# Patient Record
Sex: FEMALE | Race: WHITE | NOT HISPANIC OR LATINO | URBAN - METROPOLITAN AREA
[De-identification: names, ages, dates, MRNs, and addresses within clinical notes are randomized per-mention and may not be internally consistent; named-entity substitution may affect disease eponyms.]

---

## 2017-07-19 ENCOUNTER — OUTPATIENT (OUTPATIENT)
Dept: OUTPATIENT SERVICES | Facility: HOSPITAL | Age: 72
LOS: 1 days | Discharge: HOME | End: 2017-07-19

## 2017-07-19 DIAGNOSIS — Q78.8 OTHER SPECIFIED OSTEOCHONDRODYSPLASIAS: ICD-10-CM

## 2017-07-19 DIAGNOSIS — I05.9 RHEUMATIC MITRAL VALVE DISEASE, UNSPECIFIED: ICD-10-CM

## 2017-08-07 ENCOUNTER — OUTPATIENT (OUTPATIENT)
Dept: OUTPATIENT SERVICES | Facility: HOSPITAL | Age: 72
LOS: 1 days | Discharge: HOME | End: 2017-08-07

## 2017-08-07 DIAGNOSIS — Z01.818 ENCOUNTER FOR OTHER PREPROCEDURAL EXAMINATION: ICD-10-CM

## 2017-08-07 DIAGNOSIS — I34.0 NONRHEUMATIC MITRAL (VALVE) INSUFFICIENCY: ICD-10-CM

## 2017-08-07 DIAGNOSIS — Q78.8 OTHER SPECIFIED OSTEOCHONDRODYSPLASIAS: ICD-10-CM

## 2017-08-07 DIAGNOSIS — I05.9 RHEUMATIC MITRAL VALVE DISEASE, UNSPECIFIED: ICD-10-CM

## 2017-08-10 ENCOUNTER — OUTPATIENT (OUTPATIENT)
Dept: OUTPATIENT SERVICES | Facility: HOSPITAL | Age: 72
LOS: 1 days | Discharge: HOME | End: 2017-08-10

## 2017-08-10 DIAGNOSIS — Q78.8 OTHER SPECIFIED OSTEOCHONDRODYSPLASIAS: ICD-10-CM

## 2017-08-10 DIAGNOSIS — I05.9 RHEUMATIC MITRAL VALVE DISEASE, UNSPECIFIED: ICD-10-CM

## 2017-08-15 DIAGNOSIS — I25.10 ATHEROSCLEROTIC HEART DISEASE OF NATIVE CORONARY ARTERY WITHOUT ANGINA PECTORIS: ICD-10-CM

## 2017-08-15 DIAGNOSIS — I34.0 NONRHEUMATIC MITRAL (VALVE) INSUFFICIENCY: ICD-10-CM

## 2017-08-15 DIAGNOSIS — I27.2 OTHER SECONDARY PULMONARY HYPERTENSION: ICD-10-CM

## 2017-08-28 ENCOUNTER — OUTPATIENT (OUTPATIENT)
Dept: OUTPATIENT SERVICES | Facility: HOSPITAL | Age: 72
LOS: 1 days | Discharge: HOME | End: 2017-08-28

## 2017-08-28 DIAGNOSIS — R91.1 SOLITARY PULMONARY NODULE: ICD-10-CM

## 2017-08-28 DIAGNOSIS — Q78.8 OTHER SPECIFIED OSTEOCHONDRODYSPLASIAS: ICD-10-CM

## 2017-08-28 DIAGNOSIS — I05.9 RHEUMATIC MITRAL VALVE DISEASE, UNSPECIFIED: ICD-10-CM

## 2017-09-08 ENCOUNTER — OUTPATIENT (OUTPATIENT)
Dept: OUTPATIENT SERVICES | Facility: HOSPITAL | Age: 72
LOS: 1 days | Discharge: HOME | End: 2017-09-08

## 2017-09-08 DIAGNOSIS — I05.9 RHEUMATIC MITRAL VALVE DISEASE, UNSPECIFIED: ICD-10-CM

## 2017-09-08 DIAGNOSIS — Z01.818 ENCOUNTER FOR OTHER PREPROCEDURAL EXAMINATION: ICD-10-CM

## 2017-09-08 DIAGNOSIS — Q78.8 OTHER SPECIFIED OSTEOCHONDRODYSPLASIAS: ICD-10-CM

## 2017-09-14 ENCOUNTER — INPATIENT (INPATIENT)
Facility: HOSPITAL | Age: 72
LOS: 8 days | Discharge: HOME | End: 2017-09-23
Attending: THORACIC SURGERY (CARDIOTHORACIC VASCULAR SURGERY)

## 2017-09-14 DIAGNOSIS — Q78.8 OTHER SPECIFIED OSTEOCHONDRODYSPLASIAS: ICD-10-CM

## 2017-09-14 DIAGNOSIS — I27.2 OTHER SECONDARY PULMONARY HYPERTENSION: ICD-10-CM

## 2017-09-14 DIAGNOSIS — I05.9 RHEUMATIC MITRAL VALVE DISEASE, UNSPECIFIED: ICD-10-CM

## 2017-09-25 DIAGNOSIS — R91.8 OTHER NONSPECIFIC ABNORMAL FINDING OF LUNG FIELD: ICD-10-CM

## 2017-09-25 DIAGNOSIS — J44.9 CHRONIC OBSTRUCTIVE PULMONARY DISEASE, UNSPECIFIED: ICD-10-CM

## 2017-09-25 DIAGNOSIS — F41.9 ANXIETY DISORDER, UNSPECIFIED: ICD-10-CM

## 2017-09-25 DIAGNOSIS — J95.811 POSTPROCEDURAL PNEUMOTHORAX: ICD-10-CM

## 2017-09-25 DIAGNOSIS — D69.6 THROMBOCYTOPENIA, UNSPECIFIED: ICD-10-CM

## 2017-09-25 DIAGNOSIS — R01.1 CARDIAC MURMUR, UNSPECIFIED: ICD-10-CM

## 2017-09-25 DIAGNOSIS — I95.81 POSTPROCEDURAL HYPOTENSION: ICD-10-CM

## 2017-09-25 DIAGNOSIS — E87.6 HYPOKALEMIA: ICD-10-CM

## 2017-09-25 DIAGNOSIS — F32.9 MAJOR DEPRESSIVE DISORDER, SINGLE EPISODE, UNSPECIFIED: ICD-10-CM

## 2017-09-25 DIAGNOSIS — D72.829 ELEVATED WHITE BLOOD CELL COUNT, UNSPECIFIED: ICD-10-CM

## 2017-09-25 DIAGNOSIS — K59.00 CONSTIPATION, UNSPECIFIED: ICD-10-CM

## 2017-09-25 DIAGNOSIS — I34.0 NONRHEUMATIC MITRAL (VALVE) INSUFFICIENCY: ICD-10-CM

## 2017-09-25 DIAGNOSIS — D62 ACUTE POSTHEMORRHAGIC ANEMIA: ICD-10-CM

## 2017-09-25 DIAGNOSIS — E87.70 FLUID OVERLOAD, UNSPECIFIED: ICD-10-CM
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
## 2017-09-25 DIAGNOSIS — I10 ESSENTIAL (PRIMARY) HYPERTENSION: ICD-10-CM

## 2017-09-25 DIAGNOSIS — I48.0 PAROXYSMAL ATRIAL FIBRILLATION: ICD-10-CM

## 2017-09-25 DIAGNOSIS — R73.9 HYPERGLYCEMIA, UNSPECIFIED: ICD-10-CM

## 2017-09-27 DIAGNOSIS — I27.2 OTHER SECONDARY PULMONARY HYPERTENSION: ICD-10-CM

## 2017-09-27 DIAGNOSIS — Z87.891 PERSONAL HISTORY OF NICOTINE DEPENDENCE: ICD-10-CM

## 2017-10-02 ENCOUNTER — APPOINTMENT (OUTPATIENT)
Dept: CARDIOLOGY | Facility: CLINIC | Age: 72
End: 2017-10-02

## 2017-10-02 VITALS
SYSTOLIC BLOOD PRESSURE: 125 MMHG | DIASTOLIC BLOOD PRESSURE: 74 MMHG | HEIGHT: 65 IN | HEART RATE: 105 BPM | WEIGHT: 154 LBS | OXYGEN SATURATION: 100 % | BODY MASS INDEX: 25.66 KG/M2

## 2017-10-04 ENCOUNTER — OUTPATIENT (OUTPATIENT)
Dept: OUTPATIENT SERVICES | Facility: HOSPITAL | Age: 72
LOS: 1 days | Discharge: HOME | End: 2017-10-04

## 2017-10-04 DIAGNOSIS — R05 COUGH: ICD-10-CM

## 2017-10-04 DIAGNOSIS — Q78.8 OTHER SPECIFIED OSTEOCHONDRODYSPLASIAS: ICD-10-CM

## 2017-10-04 DIAGNOSIS — I34.9 NONRHEUMATIC MITRAL VALVE DISORDER, UNSPECIFIED: ICD-10-CM

## 2017-10-04 DIAGNOSIS — I05.9 RHEUMATIC MITRAL VALVE DISEASE, UNSPECIFIED: ICD-10-CM

## 2017-10-05 ENCOUNTER — OUTPATIENT (OUTPATIENT)
Dept: OUTPATIENT SERVICES | Facility: HOSPITAL | Age: 72
LOS: 1 days | Discharge: HOME | End: 2017-10-05

## 2017-10-05 DIAGNOSIS — Q78.8 OTHER SPECIFIED OSTEOCHONDRODYSPLASIAS: ICD-10-CM

## 2017-10-05 DIAGNOSIS — I05.9 RHEUMATIC MITRAL VALVE DISEASE, UNSPECIFIED: ICD-10-CM

## 2017-10-05 DIAGNOSIS — I34.0 NONRHEUMATIC MITRAL (VALVE) INSUFFICIENCY: ICD-10-CM

## 2017-10-09 ENCOUNTER — OUTPATIENT (OUTPATIENT)
Dept: OUTPATIENT SERVICES | Facility: HOSPITAL | Age: 72
LOS: 1 days | Discharge: HOME | End: 2017-10-09

## 2017-10-09 DIAGNOSIS — I34.0 NONRHEUMATIC MITRAL (VALVE) INSUFFICIENCY: ICD-10-CM

## 2017-10-09 DIAGNOSIS — I05.9 RHEUMATIC MITRAL VALVE DISEASE, UNSPECIFIED: ICD-10-CM

## 2017-10-09 DIAGNOSIS — Q78.8 OTHER SPECIFIED OSTEOCHONDRODYSPLASIAS: ICD-10-CM

## 2017-10-12 ENCOUNTER — OUTPATIENT (OUTPATIENT)
Dept: OUTPATIENT SERVICES | Facility: HOSPITAL | Age: 72
LOS: 1 days | Discharge: HOME | End: 2017-10-12

## 2017-10-12 DIAGNOSIS — I34.0 NONRHEUMATIC MITRAL (VALVE) INSUFFICIENCY: ICD-10-CM

## 2017-10-12 DIAGNOSIS — Q78.8 OTHER SPECIFIED OSTEOCHONDRODYSPLASIAS: ICD-10-CM

## 2017-10-12 DIAGNOSIS — I05.9 RHEUMATIC MITRAL VALVE DISEASE, UNSPECIFIED: ICD-10-CM

## 2017-10-16 ENCOUNTER — OUTPATIENT (OUTPATIENT)
Dept: OUTPATIENT SERVICES | Facility: HOSPITAL | Age: 72
LOS: 1 days | Discharge: HOME | End: 2017-10-16

## 2017-10-16 DIAGNOSIS — I34.0 NONRHEUMATIC MITRAL (VALVE) INSUFFICIENCY: ICD-10-CM

## 2017-10-16 DIAGNOSIS — I05.9 RHEUMATIC MITRAL VALVE DISEASE, UNSPECIFIED: ICD-10-CM

## 2017-10-16 DIAGNOSIS — Q78.8 OTHER SPECIFIED OSTEOCHONDRODYSPLASIAS: ICD-10-CM

## 2017-11-02 ENCOUNTER — OUTPATIENT (OUTPATIENT)
Dept: OUTPATIENT SERVICES | Facility: HOSPITAL | Age: 72
LOS: 1 days | Discharge: HOME | End: 2017-11-02

## 2017-11-02 DIAGNOSIS — R91.1 SOLITARY PULMONARY NODULE: ICD-10-CM

## 2017-11-02 DIAGNOSIS — Z01.818 ENCOUNTER FOR OTHER PREPROCEDURAL EXAMINATION: ICD-10-CM

## 2017-11-02 DIAGNOSIS — Q78.8 OTHER SPECIFIED OSTEOCHONDRODYSPLASIAS: ICD-10-CM

## 2017-11-02 DIAGNOSIS — I05.9 RHEUMATIC MITRAL VALVE DISEASE, UNSPECIFIED: ICD-10-CM

## 2017-11-13 ENCOUNTER — OUTPATIENT (OUTPATIENT)
Dept: OUTPATIENT SERVICES | Facility: HOSPITAL | Age: 72
LOS: 1 days | Discharge: HOME | End: 2017-11-13

## 2017-11-13 DIAGNOSIS — I05.9 RHEUMATIC MITRAL VALVE DISEASE, UNSPECIFIED: ICD-10-CM

## 2017-11-13 DIAGNOSIS — G25.0 ESSENTIAL TREMOR: ICD-10-CM

## 2017-11-13 DIAGNOSIS — Q78.8 OTHER SPECIFIED OSTEOCHONDRODYSPLASIAS: ICD-10-CM

## 2017-11-16 ENCOUNTER — INPATIENT (INPATIENT)
Facility: HOSPITAL | Age: 72
LOS: 2 days | Discharge: HOME | End: 2017-11-19
Attending: THORACIC SURGERY (CARDIOTHORACIC VASCULAR SURGERY)

## 2017-11-16 DIAGNOSIS — C77.1 SECONDARY AND UNSPECIFIED MALIGNANT NEOPLASM OF INTRATHORACIC LYMPH NODES: ICD-10-CM

## 2017-11-16 DIAGNOSIS — I05.9 RHEUMATIC MITRAL VALVE DISEASE, UNSPECIFIED: ICD-10-CM

## 2017-11-16 DIAGNOSIS — Q78.8 OTHER SPECIFIED OSTEOCHONDRODYSPLASIAS: ICD-10-CM

## 2017-11-22 DIAGNOSIS — R91.1 SOLITARY PULMONARY NODULE: ICD-10-CM

## 2017-11-22 DIAGNOSIS — I48.0 PAROXYSMAL ATRIAL FIBRILLATION: ICD-10-CM

## 2017-11-22 DIAGNOSIS — C34.12 MALIGNANT NEOPLASM OF UPPER LOBE, LEFT BRONCHUS OR LUNG: ICD-10-CM

## 2017-11-22 DIAGNOSIS — F32.9 MAJOR DEPRESSIVE DISORDER, SINGLE EPISODE, UNSPECIFIED: ICD-10-CM

## 2017-11-22 DIAGNOSIS — Z79.01 LONG TERM (CURRENT) USE OF ANTICOAGULANTS: ICD-10-CM

## 2017-11-22 DIAGNOSIS — Z96.1 PRESENCE OF INTRAOCULAR LENS: ICD-10-CM

## 2017-11-22 DIAGNOSIS — Z98.890 OTHER SPECIFIED POSTPROCEDURAL STATES: ICD-10-CM

## 2017-11-22 DIAGNOSIS — Z98.42 CATARACT EXTRACTION STATUS, LEFT EYE: ICD-10-CM

## 2017-11-22 DIAGNOSIS — Z98.41 CATARACT EXTRACTION STATUS, RIGHT EYE: ICD-10-CM

## 2017-11-22 DIAGNOSIS — J44.9 CHRONIC OBSTRUCTIVE PULMONARY DISEASE, UNSPECIFIED: ICD-10-CM

## 2017-11-22 DIAGNOSIS — F41.9 ANXIETY DISORDER, UNSPECIFIED: ICD-10-CM

## 2017-12-08 ENCOUNTER — OUTPATIENT (OUTPATIENT)
Dept: OUTPATIENT SERVICES | Facility: HOSPITAL | Age: 72
LOS: 1 days | Discharge: HOME | End: 2017-12-08

## 2017-12-08 ENCOUNTER — APPOINTMENT (OUTPATIENT)
Dept: INFUSION THERAPY | Facility: CLINIC | Age: 72
End: 2017-12-08

## 2017-12-08 ENCOUNTER — APPOINTMENT (OUTPATIENT)
Dept: HEMATOLOGY ONCOLOGY | Facility: CLINIC | Age: 72
End: 2017-12-08

## 2017-12-08 VITALS
HEIGHT: 65 IN | BODY MASS INDEX: 20.99 KG/M2 | SYSTOLIC BLOOD PRESSURE: 126 MMHG | TEMPERATURE: 95.7 F | WEIGHT: 126 LBS | HEART RATE: 89 BPM | DIASTOLIC BLOOD PRESSURE: 61 MMHG | RESPIRATION RATE: 16 BRPM

## 2017-12-14 ENCOUNTER — OUTPATIENT (OUTPATIENT)
Dept: OUTPATIENT SERVICES | Facility: HOSPITAL | Age: 72
LOS: 1 days | Discharge: HOME | End: 2017-12-14

## 2017-12-14 DIAGNOSIS — I05.9 RHEUMATIC MITRAL VALVE DISEASE, UNSPECIFIED: ICD-10-CM

## 2017-12-14 DIAGNOSIS — Q78.8 OTHER SPECIFIED OSTEOCHONDRODYSPLASIAS: ICD-10-CM

## 2017-12-14 DIAGNOSIS — C34.90 MALIGNANT NEOPLASM OF UNSPECIFIED PART OF UNSPECIFIED BRONCHUS OR LUNG: ICD-10-CM

## 2017-12-14 LAB
ALBUMIN SERPL-MCNC: 3.9 G/DL
ALBUMIN/GLOB SERPL: 1.34
ALP SERPL-CCNC: 75 IU/L
ALT SERPL-CCNC: 18 IU/L
ANION GAP SERPL CALC-SCNC: 7 MEQ/L
AST SERPL-CCNC: 27 IU/L
BASOPHILS # BLD: 0.05 TH/MM3
BASOPHILS NFR BLD: 0.8 %
BILIRUB SERPL-MCNC: 0.5 MG/DL
BUN SERPL-MCNC: 9 MG/DL
BUN/CREAT SERPL: 9.8 %
CALCIUM SERPL-MCNC: 10 MG/DL
CHLORIDE SERPL-SCNC: 105 MEQ/L
CO2 SERPL-SCNC: 27 MEQ/L
CREAT SERPL-MCNC: 0.92 MG/DL
EOSINOPHIL # BLD: 0.1 TH/MM3
EOSINOPHIL NFR BLD: 1.5 %
ERYTHROCYTE [DISTWIDTH] IN BLOOD BY AUTOMATED COUNT: 14.3 %
FOLATE SERPL-MCNC: > 20 NG/ML
GFR SERPL CREATININE-BSD FRML MDRD: 60
GLUCOSE SERPL-MCNC: 86 MG/DL
GRANULOCYTES # BLD: 4.38 TH/MM3
GRANULOCYTES NFR BLD: 67.7 %
HCT VFR BLD AUTO: 36.6 %
HGB BLD-MCNC: 11.7 G/DL
IMM GRANULOCYTES # BLD: 0.01 TH/MM3
IMM GRANULOCYTES NFR BLD: 0.2 %
LYMPHOCYTES # BLD: 1.18 TH/MM3
LYMPHOCYTES NFR BLD: 18.3 %
MCH RBC QN AUTO: 27.5 PG
MCHC RBC AUTO-ENTMCNC: 32 G/DL
MCV RBC AUTO: 86.1 FL
MONOCYTES # BLD: 0.74 TH/MM3
MONOCYTES NFR BLD: 11.5 %
PLATELET # BLD: 412 TH/MM3
PMV BLD AUTO: 8.5 FL
POTASSIUM SERPL-SCNC: 5.1 MMOL/L
PROT SERPL-MCNC: 6.8 G/DL
RBC # BLD AUTO: 4.25 MIL/MM3
SODIUM SERPL-SCNC: 139 MEQ/L
VIT B12 SERPL-MCNC: 1459 PG/ML
WBC # BLD: 6.46 TH/MM3

## 2017-12-18 ENCOUNTER — OUTPATIENT (OUTPATIENT)
Dept: OUTPATIENT SERVICES | Facility: HOSPITAL | Age: 72
LOS: 1 days | Discharge: HOME | End: 2017-12-18

## 2017-12-18 DIAGNOSIS — I05.9 RHEUMATIC MITRAL VALVE DISEASE, UNSPECIFIED: ICD-10-CM

## 2017-12-18 DIAGNOSIS — C34.90 MALIGNANT NEOPLASM OF UNSPECIFIED PART OF UNSPECIFIED BRONCHUS OR LUNG: ICD-10-CM

## 2017-12-18 DIAGNOSIS — Q78.8 OTHER SPECIFIED OSTEOCHONDRODYSPLASIAS: ICD-10-CM

## 2017-12-21 DIAGNOSIS — C34.90 MALIGNANT NEOPLASM OF UNSPECIFIED PART OF UNSPECIFIED BRONCHUS OR LUNG: ICD-10-CM

## 2018-01-16 ENCOUNTER — APPOINTMENT (OUTPATIENT)
Dept: HEMATOLOGY ONCOLOGY | Facility: CLINIC | Age: 73
End: 2018-01-16

## 2018-01-16 VITALS
BODY MASS INDEX: 20.66 KG/M2 | RESPIRATION RATE: 16 BRPM | WEIGHT: 124 LBS | DIASTOLIC BLOOD PRESSURE: 71 MMHG | SYSTOLIC BLOOD PRESSURE: 147 MMHG | TEMPERATURE: 97.2 F | HEIGHT: 65 IN | HEART RATE: 97 BPM

## 2018-01-18 ENCOUNTER — APPOINTMENT (OUTPATIENT)
Dept: INFUSION THERAPY | Facility: CLINIC | Age: 73
End: 2018-01-18

## 2018-01-19 ENCOUNTER — APPOINTMENT (OUTPATIENT)
Dept: INFUSION THERAPY | Facility: CLINIC | Age: 73
End: 2018-01-19

## 2018-01-19 LAB
ALBUMIN SERPL-MCNC: 3.7 G/DL
ALBUMIN/GLOB SERPL: 1.37
ALP SERPL-CCNC: 70 IU/L
ALT SERPL-CCNC: 20 IU/L
ANION GAP SERPL CALC-SCNC: 9 MEQ/L
AST SERPL-CCNC: 23 IU/L
BASOPHILS # BLD: 0.07 TH/MM3
BASOPHILS NFR BLD: 1 %
BILIRUB SERPL-MCNC: 0.5 MG/DL
BUN SERPL-MCNC: 11 MG/DL
BUN/CREAT SERPL: 12.2 %
CALCIUM SERPL-MCNC: 9.9 MG/DL
CHLORIDE SERPL-SCNC: 105 MEQ/L
CO2 SERPL-SCNC: 28 MEQ/L
CREAT SERPL-MCNC: 0.9 MG/DL
EOSINOPHIL # BLD: 0.16 TH/MM3
EOSINOPHIL NFR BLD: 2.4 %
ERYTHROCYTE [DISTWIDTH] IN BLOOD BY AUTOMATED COUNT: 15 %
GFR SERPL CREATININE-BSD FRML MDRD: 61
GLUCOSE SERPL-MCNC: 98 MG/DL
GRANULOCYTES # BLD: 4.17 TH/MM3
GRANULOCYTES NFR BLD: 62.2 %
HCT VFR BLD AUTO: 32.3 %
HGB BLD-MCNC: 11.8 G/DL
IMM GRANULOCYTES # BLD: 0.03 TH/MM3
IMM GRANULOCYTES NFR BLD: 0.4 %
LYMPHOCYTES # BLD: 1.48 TH/MM3
LYMPHOCYTES NFR BLD: 22.1 %
MCH RBC QN AUTO: 27.6 PG
MCHC RBC AUTO-ENTMCNC: 36.5 G/DL
MCV RBC AUTO: 75.6 FL
MONOCYTES # BLD: 0.8 TH/MM3
MONOCYTES NFR BLD: 11.9 %
PLATELET # BLD: 308 TH/MM3
PMV BLD AUTO: 8.5 FL
POTASSIUM SERPL-SCNC: 3.9 MMOL/L
PROT SERPL-MCNC: 6.4 G/DL
RBC # BLD AUTO: 4.27 MIL/MM3
SODIUM SERPL-SCNC: 142 MEQ/L
WBC # BLD: 6.71 TH/MM3

## 2018-02-09 ENCOUNTER — APPOINTMENT (OUTPATIENT)
Dept: INFUSION THERAPY | Facility: CLINIC | Age: 73
End: 2018-02-09

## 2018-02-09 ENCOUNTER — APPOINTMENT (OUTPATIENT)
Dept: HEMATOLOGY ONCOLOGY | Facility: CLINIC | Age: 73
End: 2018-02-09

## 2018-02-09 ENCOUNTER — LABORATORY RESULT (OUTPATIENT)
Age: 73
End: 2018-02-09

## 2018-02-09 VITALS
RESPIRATION RATE: 16 BRPM | SYSTOLIC BLOOD PRESSURE: 154 MMHG | DIASTOLIC BLOOD PRESSURE: 66 MMHG | HEART RATE: 92 BPM | BODY MASS INDEX: 20.33 KG/M2 | TEMPERATURE: 96.8 F | HEIGHT: 65 IN | WEIGHT: 122 LBS

## 2018-02-09 RX ORDER — PEMETREXED 500 MG/20ML
800 INJECTION, SOLUTION, CONCENTRATE INTRAVENOUS ONCE
Qty: 0 | Refills: 0 | Status: COMPLETED | OUTPATIENT
Start: 2018-02-09 | End: 2018-02-09

## 2018-02-09 RX ORDER — ONDANSETRON 8 MG/1
16 TABLET, FILM COATED ORAL ONCE
Qty: 0 | Refills: 0 | Status: COMPLETED | OUTPATIENT
Start: 2018-02-09 | End: 2018-02-09

## 2018-02-09 RX ORDER — CARBOPLATIN 50 MG
370 VIAL (EA) INTRAVENOUS ONCE
Qty: 0 | Refills: 0 | Status: COMPLETED | OUTPATIENT
Start: 2018-02-09 | End: 2018-02-09

## 2018-02-09 RX ORDER — DEXAMETHASONE 0.5 MG/5ML
12 ELIXIR ORAL ONCE
Qty: 0 | Refills: 0 | Status: COMPLETED | OUTPATIENT
Start: 2018-02-09 | End: 2018-02-09

## 2018-02-09 RX ADMIN — Medication 287 MILLIGRAM(S): at 10:04

## 2018-02-09 RX ADMIN — PEMETREXED 396 MILLIGRAM(S): 500 INJECTION, SOLUTION, CONCENTRATE INTRAVENOUS at 10:05

## 2018-02-09 RX ADMIN — ONDANSETRON 174 MILLIGRAM(S): 8 TABLET, FILM COATED ORAL at 09:54

## 2018-02-09 RX ADMIN — Medication 159 MILLIGRAM(S): at 09:55

## 2018-02-15 LAB
ALBUMIN SERPL ELPH-MCNC: 4 G/DL
ALP BLD-CCNC: 77 U/L
ALT SERPL-CCNC: 31 U/L
ANION GAP SERPL CALC-SCNC: 6 MMOL/L
AST SERPL-CCNC: 32 U/L
BILIRUB SERPL-MCNC: 0.5 MG/DL
BUN SERPL-MCNC: 10 MG/DL
CALCIUM SERPL-MCNC: 9.9 MG/DL
CHLORIDE SERPL-SCNC: 104 MMOL/L
CO2 SERPL-SCNC: 28 MMOL/L
CREAT SERPL-MCNC: 1 MG/DL
FERRITIN SERPL-MCNC: 130 NG/ML
FOLATE SERPL-MCNC: >20 NG/ML
GLUCOSE SERPL-MCNC: 71 MG/DL
HCT VFR BLD CALC: 34.5 %
HGB BLD-MCNC: 11.5 G/DL
IRON SATN MFR SERPL: 16 %
IRON SERPL-MCNC: 60 UG/DL
MCHC RBC-ENTMCNC: 27.8 PG
MCHC RBC-ENTMCNC: 33.3 G/DL
MCV RBC AUTO: 83.3 FL
METHYLMALONATE SERPL-SCNC: 194 NMOL/L
PLATELET # BLD AUTO: 488 K/UL
PMV BLD: 8.5 FL
POTASSIUM SERPL-SCNC: 4.2 MMOL/L
PROT SERPL-MCNC: 6.7 G/DL
RBC # BLD: 4.14 M/UL
RBC # FLD: 16.7 %
SODIUM SERPL-SCNC: 138 MMOL/L
TIBC SERPL-MCNC: 369 UG/ML
VIT B12 SERPL-MCNC: >2000 PG/ML
WBC # FLD AUTO: 7.4 K/UL

## 2018-03-02 ENCOUNTER — APPOINTMENT (OUTPATIENT)
Dept: HEMATOLOGY ONCOLOGY | Facility: CLINIC | Age: 73
End: 2018-03-02

## 2018-03-02 ENCOUNTER — APPOINTMENT (OUTPATIENT)
Dept: INFUSION THERAPY | Facility: CLINIC | Age: 73
End: 2018-03-02

## 2018-03-02 ENCOUNTER — LABORATORY RESULT (OUTPATIENT)
Age: 73
End: 2018-03-02

## 2018-03-02 VITALS
TEMPERATURE: 98.2 F | HEART RATE: 93 BPM | WEIGHT: 124 LBS | BODY MASS INDEX: 20.66 KG/M2 | DIASTOLIC BLOOD PRESSURE: 56 MMHG | HEIGHT: 65 IN | RESPIRATION RATE: 16 BRPM | SYSTOLIC BLOOD PRESSURE: 120 MMHG

## 2018-03-02 RX ORDER — PEMETREXED 500 MG/20ML
800 INJECTION, SOLUTION, CONCENTRATE INTRAVENOUS ONCE
Qty: 0 | Refills: 0 | Status: COMPLETED | OUTPATIENT
Start: 2018-03-02 | End: 2018-03-02

## 2018-03-02 RX ORDER — PREGABALIN 225 MG/1
1000 CAPSULE ORAL ONCE
Qty: 0 | Refills: 0 | Status: COMPLETED | OUTPATIENT
Start: 2018-03-02 | End: 2018-03-02

## 2018-03-02 RX ORDER — PEGFILGRASTIM-CBQV 6 MG/.6ML
6 INJECTION, SOLUTION SUBCUTANEOUS ONCE
Qty: 0 | Refills: 0 | Status: COMPLETED | OUTPATIENT
Start: 2018-03-02 | End: 2018-03-02

## 2018-03-02 RX ORDER — DEXAMETHASONE 0.5 MG/5ML
12 ELIXIR ORAL ONCE
Qty: 0 | Refills: 0 | Status: COMPLETED | OUTPATIENT
Start: 2018-03-02 | End: 2018-03-02

## 2018-03-02 RX ORDER — CARBOPLATIN 50 MG
370 VIAL (EA) INTRAVENOUS ONCE
Qty: 0 | Refills: 0 | Status: COMPLETED | OUTPATIENT
Start: 2018-03-02 | End: 2018-03-02

## 2018-03-02 RX ADMIN — Medication 287 MILLIGRAM(S): at 09:50

## 2018-03-02 RX ADMIN — Medication 183 MILLIGRAM(S): at 09:33

## 2018-03-02 RX ADMIN — PEGFILGRASTIM-CBQV 6 MILLIGRAM(S): 6 INJECTION, SOLUTION SUBCUTANEOUS at 10:11

## 2018-03-02 RX ADMIN — PEMETREXED 396 MILLIGRAM(S): 500 INJECTION, SOLUTION, CONCENTRATE INTRAVENOUS at 09:49

## 2018-03-02 RX ADMIN — PREGABALIN 1000 MICROGRAM(S): 225 CAPSULE ORAL at 10:12

## 2018-03-12 LAB
ALBUMIN SERPL ELPH-MCNC: 4 G/DL
ALP BLD-CCNC: 80 U/L
ALT SERPL-CCNC: 34 U/L
ANION GAP SERPL CALC-SCNC: 12 MMOL/L
AST SERPL-CCNC: 40 U/L
BILIRUB SERPL-MCNC: 0.3 MG/DL
BUN SERPL-MCNC: 9 MG/DL
CALCIUM SERPL-MCNC: 9.1 MG/DL
CHLORIDE SERPL-SCNC: 102 MMOL/L
CO2 SERPL-SCNC: 25 MMOL/L
CREAT SERPL-MCNC: 0.9 MG/DL
GLUCOSE SERPL-MCNC: 69 MG/DL
HCT VFR BLD CALC: 33.5 %
HGB BLD-MCNC: 11.5 G/DL
MCHC RBC-ENTMCNC: 29.5 PG
MCHC RBC-ENTMCNC: 34.3 G/DL
MCV RBC AUTO: 85.9 FL
PLATELET # BLD AUTO: 339 K/UL
PMV BLD: 8.9 FL
POTASSIUM SERPL-SCNC: 5.3 MMOL/L
PROT SERPL-MCNC: 6.7 G/DL
RBC # BLD: 3.9 M/UL
RBC # FLD: 19.9 %
SODIUM SERPL-SCNC: 139 MMOL/L
WBC # FLD AUTO: 7.23 K/UL

## 2018-03-23 ENCOUNTER — OUTPATIENT (OUTPATIENT)
Dept: OUTPATIENT SERVICES | Facility: HOSPITAL | Age: 73
LOS: 1 days | Discharge: HOME | End: 2018-03-23

## 2018-03-23 ENCOUNTER — APPOINTMENT (OUTPATIENT)
Dept: INFUSION THERAPY | Facility: CLINIC | Age: 73
End: 2018-03-23

## 2018-03-23 ENCOUNTER — APPOINTMENT (OUTPATIENT)
Dept: HEMATOLOGY ONCOLOGY | Facility: CLINIC | Age: 73
End: 2018-03-23

## 2018-03-23 ENCOUNTER — LABORATORY RESULT (OUTPATIENT)
Age: 73
End: 2018-03-23

## 2018-03-23 VITALS
RESPIRATION RATE: 16 BRPM | SYSTOLIC BLOOD PRESSURE: 137 MMHG | DIASTOLIC BLOOD PRESSURE: 58 MMHG | TEMPERATURE: 96.7 F | BODY MASS INDEX: 20.33 KG/M2 | HEIGHT: 65 IN | HEART RATE: 90 BPM | WEIGHT: 122 LBS

## 2018-03-23 VITALS — HEIGHT: 65 IN | WEIGHT: 121.92 LBS

## 2018-03-23 DIAGNOSIS — Z51.11 ENCOUNTER FOR ANTINEOPLASTIC CHEMOTHERAPY: ICD-10-CM

## 2018-03-23 DIAGNOSIS — C34.90 MALIGNANT NEOPLASM OF UNSPECIFIED PART OF UNSPECIFIED BRONCHUS OR LUNG: ICD-10-CM

## 2018-03-23 RX ORDER — CARBOPLATIN 50 MG
365 VIAL (EA) INTRAVENOUS ONCE
Qty: 0 | Refills: 0 | Status: COMPLETED | OUTPATIENT
Start: 2018-03-23 | End: 2018-03-23

## 2018-03-23 RX ORDER — DEXAMETHASONE 0.5 MG/5ML
12 ELIXIR ORAL ONCE
Qty: 0 | Refills: 0 | Status: COMPLETED | OUTPATIENT
Start: 2018-03-23 | End: 2018-03-23

## 2018-03-23 RX ORDER — PEGFILGRASTIM-CBQV 6 MG/.6ML
6 INJECTION, SOLUTION SUBCUTANEOUS ONCE
Qty: 0 | Refills: 0 | Status: COMPLETED | OUTPATIENT
Start: 2018-03-23 | End: 2018-03-23

## 2018-03-23 RX ORDER — PEMETREXED 500 MG/20ML
800 INJECTION, SOLUTION, CONCENTRATE INTRAVENOUS ONCE
Qty: 0 | Refills: 0 | Status: COMPLETED | OUTPATIENT
Start: 2018-03-23 | End: 2018-03-23

## 2018-03-23 RX ADMIN — Medication 183 MILLIGRAM(S): at 10:12

## 2018-03-23 RX ADMIN — PEMETREXED 396 MILLIGRAM(S): 500 INJECTION, SOLUTION, CONCENTRATE INTRAVENOUS at 10:45

## 2018-03-23 RX ADMIN — Medication 286.5 MILLIGRAM(S): at 10:46

## 2018-03-23 RX ADMIN — PEGFILGRASTIM-CBQV 6 MILLIGRAM(S): 6 INJECTION, SOLUTION SUBCUTANEOUS at 12:11

## 2018-03-26 LAB
ALBUMIN SERPL ELPH-MCNC: 4 G/DL
ALP BLD-CCNC: 80 U/L
ALT SERPL-CCNC: 23 U/L
ANION GAP SERPL CALC-SCNC: 13 MMOL/L
AST SERPL-CCNC: 21 U/L
BILIRUB SERPL-MCNC: <0.2 MG/DL
BUN SERPL-MCNC: 11 MG/DL
CALCIUM SERPL-MCNC: 9.4 MG/DL
CHLORIDE SERPL-SCNC: 101 MMOL/L
CO2 SERPL-SCNC: 27 MMOL/L
CREAT SERPL-MCNC: 0.9 MG/DL
GLUCOSE SERPL-MCNC: 110 MG/DL
HCT VFR BLD CALC: 31.7 %
HGB BLD-MCNC: 10.5 G/DL
MCHC RBC-ENTMCNC: 29.5 PG
MCHC RBC-ENTMCNC: 33.1 G/DL
MCV RBC AUTO: 89 FL
PLATELET # BLD AUTO: 379 K/UL
PMV BLD: 8.7 FL
POTASSIUM SERPL-SCNC: 4.5 MMOL/L
PROT SERPL-MCNC: 6.6 G/DL
RBC # BLD: 3.56 M/UL
RBC # FLD: 21.2 %
SODIUM SERPL-SCNC: 141 MMOL/L
WBC # FLD AUTO: 6.06 K/UL

## 2018-04-03 ENCOUNTER — APPOINTMENT (OUTPATIENT)
Dept: HEMATOLOGY ONCOLOGY | Facility: CLINIC | Age: 73
End: 2018-04-03

## 2018-04-13 ENCOUNTER — APPOINTMENT (OUTPATIENT)
Dept: HEMATOLOGY ONCOLOGY | Facility: CLINIC | Age: 73
End: 2018-04-13

## 2018-04-13 ENCOUNTER — APPOINTMENT (OUTPATIENT)
Dept: INFUSION THERAPY | Facility: CLINIC | Age: 73
End: 2018-04-13

## 2018-04-20 ENCOUNTER — LABORATORY RESULT (OUTPATIENT)
Age: 73
End: 2018-04-20

## 2018-04-20 ENCOUNTER — APPOINTMENT (OUTPATIENT)
Dept: HEMATOLOGY ONCOLOGY | Facility: CLINIC | Age: 73
End: 2018-04-20

## 2018-04-20 VITALS
WEIGHT: 124 LBS | SYSTOLIC BLOOD PRESSURE: 134 MMHG | BODY MASS INDEX: 20.66 KG/M2 | RESPIRATION RATE: 16 BRPM | HEIGHT: 65 IN | DIASTOLIC BLOOD PRESSURE: 60 MMHG | HEART RATE: 91 BPM | TEMPERATURE: 95.9 F

## 2018-04-24 LAB
ALBUMIN SERPL ELPH-MCNC: 4.3 G/DL
ALP BLD-CCNC: 79 U/L
ALT SERPL-CCNC: 17 U/L
ANION GAP SERPL CALC-SCNC: 15 MMOL/L
AST SERPL-CCNC: 26 U/L
BILIRUB SERPL-MCNC: 0.3 MG/DL
BUN SERPL-MCNC: 10 MG/DL
CALCIUM SERPL-MCNC: 10 MG/DL
CHLORIDE SERPL-SCNC: 100 MMOL/L
CO2 SERPL-SCNC: 26 MMOL/L
CREAT SERPL-MCNC: 0.9 MG/DL
FOLATE SERPL-MCNC: >20 NG/ML
GLUCOSE SERPL-MCNC: 80 MG/DL
HCT VFR BLD CALC: 35.2 %
HGB BLD-MCNC: 11.5 G/DL
MCHC RBC-ENTMCNC: 30.7 PG
MCHC RBC-ENTMCNC: 32.7 G/DL
MCV RBC AUTO: 93.9 FL
PLATELET # BLD AUTO: 389 K/UL
PMV BLD: 8.5 FL
POTASSIUM SERPL-SCNC: 4.8 MMOL/L
PROT SERPL-MCNC: 7.1 G/DL
RBC # BLD: 3.75 M/UL
RBC # FLD: 21.3 %
SODIUM SERPL-SCNC: 141 MMOL/L
VIT B12 SERPL-MCNC: >2000 PG/ML
WBC # FLD AUTO: 7.95 K/UL

## 2018-04-26 LAB — METHYLMALONATE SERPL-SCNC: 281 NMOL/L

## 2018-05-24 ENCOUNTER — OUTPATIENT (OUTPATIENT)
Dept: OUTPATIENT SERVICES | Facility: HOSPITAL | Age: 73
LOS: 1 days | Discharge: HOME | End: 2018-05-24

## 2018-05-24 DIAGNOSIS — R10.10 UPPER ABDOMINAL PAIN, UNSPECIFIED: ICD-10-CM

## 2018-05-25 ENCOUNTER — OUTPATIENT (OUTPATIENT)
Dept: OUTPATIENT SERVICES | Facility: HOSPITAL | Age: 73
LOS: 1 days | Discharge: HOME | End: 2018-05-25

## 2018-05-25 DIAGNOSIS — R91.1 SOLITARY PULMONARY NODULE: ICD-10-CM

## 2018-05-29 ENCOUNTER — OUTPATIENT (OUTPATIENT)
Dept: OUTPATIENT SERVICES | Facility: HOSPITAL | Age: 73
LOS: 1 days | Discharge: HOME | End: 2018-05-29

## 2018-05-29 ENCOUNTER — APPOINTMENT (OUTPATIENT)
Dept: HEMATOLOGY ONCOLOGY | Facility: CLINIC | Age: 73
End: 2018-05-29

## 2018-05-29 VITALS
HEART RATE: 83 BPM | HEIGHT: 65 IN | BODY MASS INDEX: 20.49 KG/M2 | RESPIRATION RATE: 16 BRPM | DIASTOLIC BLOOD PRESSURE: 64 MMHG | TEMPERATURE: 97.3 F | WEIGHT: 123 LBS | SYSTOLIC BLOOD PRESSURE: 150 MMHG

## 2018-05-29 DIAGNOSIS — C34.90 MALIGNANT NEOPLASM OF UNSPECIFIED PART OF UNSPECIFIED BRONCHUS OR LUNG: ICD-10-CM

## 2018-07-02 LAB
HCT VFR BLD CALC: 39.1 % — SIGNIFICANT CHANGE UP (ref 37–47)
HGB BLD-MCNC: 13 G/DL — SIGNIFICANT CHANGE UP (ref 12–16)
MCHC RBC-ENTMCNC: 30.5 PG — SIGNIFICANT CHANGE UP (ref 27–31)
MCHC RBC-ENTMCNC: 33.2 G/DL — SIGNIFICANT CHANGE UP (ref 32–37)
MCV RBC AUTO: 91.8 FL — SIGNIFICANT CHANGE UP (ref 81–99)
NRBC # BLD: 0 /100 WBCS — SIGNIFICANT CHANGE UP (ref 0–0)
PLATELET # BLD AUTO: 164 K/UL — SIGNIFICANT CHANGE UP (ref 130–400)
RBC # BLD: 4.26 M/UL — SIGNIFICANT CHANGE UP (ref 4.2–5.4)
RBC # FLD: 13.8 % — SIGNIFICANT CHANGE UP (ref 11.5–14.5)
WBC # BLD: 5.41 K/UL — SIGNIFICANT CHANGE UP (ref 4.8–10.8)
WBC # FLD AUTO: 5.41 K/UL — SIGNIFICANT CHANGE UP (ref 4.8–10.8)

## 2018-07-27 ENCOUNTER — OUTPATIENT (OUTPATIENT)
Dept: OUTPATIENT SERVICES | Facility: HOSPITAL | Age: 73
LOS: 1 days | Discharge: HOME | End: 2018-07-27

## 2018-07-27 DIAGNOSIS — C34.10 MALIGNANT NEOPLASM OF UPPER LOBE, UNSPECIFIED BRONCHUS OR LUNG: ICD-10-CM

## 2018-08-22 ENCOUNTER — APPOINTMENT (OUTPATIENT)
Dept: HEMATOLOGY ONCOLOGY | Facility: CLINIC | Age: 73
End: 2018-08-22

## 2018-08-22 ENCOUNTER — LABORATORY RESULT (OUTPATIENT)
Age: 73
End: 2018-08-22

## 2018-08-22 VITALS
RESPIRATION RATE: 14 BRPM | WEIGHT: 123 LBS | TEMPERATURE: 96.6 F | HEIGHT: 65 IN | DIASTOLIC BLOOD PRESSURE: 53 MMHG | SYSTOLIC BLOOD PRESSURE: 96 MMHG | BODY MASS INDEX: 20.49 KG/M2 | HEART RATE: 114 BPM

## 2018-08-23 LAB
ALBUMIN SERPL ELPH-MCNC: 4.2 G/DL
ALP BLD-CCNC: 69 U/L
ALT SERPL-CCNC: 17 U/L
ANION GAP SERPL CALC-SCNC: 14 MMOL/L
AST SERPL-CCNC: 23 U/L
BILIRUB SERPL-MCNC: 0.5 MG/DL
BUN SERPL-MCNC: 15 MG/DL
CALCIUM SERPL-MCNC: 9.5 MG/DL
CHLORIDE SERPL-SCNC: 104 MMOL/L
CO2 SERPL-SCNC: 25 MMOL/L
CREAT SERPL-MCNC: 1 MG/DL
GLUCOSE SERPL-MCNC: 91 MG/DL
HCT VFR BLD CALC: 37.8 %
HGB BLD-MCNC: 12.7 G/DL
MCHC RBC-ENTMCNC: 30.5 PG
MCHC RBC-ENTMCNC: 33.6 G/DL
MCV RBC AUTO: 90.9 FL
PLATELET # BLD AUTO: 167 K/UL
PMV BLD: 8.6 FL
POTASSIUM SERPL-SCNC: 4.2 MMOL/L
PROT SERPL-MCNC: 6.8 G/DL
RBC # BLD: 4.16 M/UL
RBC # FLD: 15.8 %
SODIUM SERPL-SCNC: 143 MMOL/L
WBC # FLD AUTO: 6.33 K/UL

## 2018-11-16 ENCOUNTER — OUTPATIENT (OUTPATIENT)
Dept: OUTPATIENT SERVICES | Facility: HOSPITAL | Age: 73
LOS: 1 days | Discharge: HOME | End: 2018-11-16

## 2018-11-16 ENCOUNTER — APPOINTMENT (OUTPATIENT)
Dept: HEMATOLOGY ONCOLOGY | Facility: CLINIC | Age: 73
End: 2018-11-16

## 2018-11-16 DIAGNOSIS — Z00.00 ENCOUNTER FOR GENERAL ADULT MEDICAL EXAMINATION W/OUT ABNORMAL FINDINGS: ICD-10-CM

## 2018-11-16 DIAGNOSIS — C34.90 MALIGNANT NEOPLASM OF UNSPECIFIED PART OF UNSPECIFIED BRONCHUS OR LUNG: ICD-10-CM

## 2018-11-16 LAB
ALBUMIN SERPL ELPH-MCNC: 4.3 G/DL
ALP BLD-CCNC: 71 U/L
ALT SERPL-CCNC: 17 U/L
ANION GAP SERPL CALC-SCNC: 13 MMOL/L
AST SERPL-CCNC: 21 U/L
BILIRUB SERPL-MCNC: 0.5 MG/DL
BUN SERPL-MCNC: 13 MG/DL
CALCIUM SERPL-MCNC: 9.8 MG/DL
CHLORIDE SERPL-SCNC: 100 MMOL/L
CO2 SERPL-SCNC: 28 MMOL/L
CREAT SERPL-MCNC: 0.9 MG/DL
GLUCOSE SERPL-MCNC: 74 MG/DL
POTASSIUM SERPL-SCNC: 4.4 MMOL/L
PROT SERPL-MCNC: 6.9 G/DL
SODIUM SERPL-SCNC: 141 MMOL/L

## 2018-11-27 ENCOUNTER — OUTPATIENT (OUTPATIENT)
Dept: OUTPATIENT SERVICES | Facility: HOSPITAL | Age: 73
LOS: 1 days | Discharge: HOME | End: 2018-11-27

## 2018-11-27 DIAGNOSIS — C34.90 MALIGNANT NEOPLASM OF UNSPECIFIED PART OF UNSPECIFIED BRONCHUS OR LUNG: ICD-10-CM

## 2018-11-30 ENCOUNTER — OUTPATIENT (OUTPATIENT)
Dept: OUTPATIENT SERVICES | Facility: HOSPITAL | Age: 73
LOS: 1 days | Discharge: HOME | End: 2018-11-30

## 2018-11-30 DIAGNOSIS — C34.90 MALIGNANT NEOPLASM OF UNSPECIFIED PART OF UNSPECIFIED BRONCHUS OR LUNG: ICD-10-CM

## 2019-04-09 ENCOUNTER — LABORATORY RESULT (OUTPATIENT)
Age: 74
End: 2019-04-09

## 2019-04-09 ENCOUNTER — APPOINTMENT (OUTPATIENT)
Dept: HEMATOLOGY ONCOLOGY | Facility: CLINIC | Age: 74
End: 2019-04-09

## 2019-04-09 VITALS
TEMPERATURE: 97.3 F | WEIGHT: 125 LBS | RESPIRATION RATE: 14 BRPM | SYSTOLIC BLOOD PRESSURE: 123 MMHG | HEIGHT: 65 IN | DIASTOLIC BLOOD PRESSURE: 69 MMHG | BODY MASS INDEX: 20.83 KG/M2 | HEART RATE: 81 BPM

## 2019-04-09 NOTE — ASSESSMENT
[FreeTextEntry1] : Stage IIIa, N2 positive adenocarcinoma of the lung, EGFR missense mutation in exon 21 positive, Alk neg, ROS1 neg, PDL-1 0%\par --restaging CT C/A/P ordered and done on 12/14/2017 (last staging scans on 8/2017), MRI of the brain done on 12/18/2017\par --Adjuvant Carbo/Alimta on 1/18-3/23/2018, tolerated 4 cycles without any difficulty, with on body Neulasta\par --B12, folic acid supplementation \par --Completed adjuvant radiation to mediastinum given N2 disease on 7/27/2018, received 25 treatments\par --PET CT 5/25/2018 [facility]\par --MRI liver 5/26/2018 revealed hemangioma, no suspicious liver masses\par --Patient declined Alchemist trial participation, consent provided 4/20/2018 \par --She continues on baby ASA as well as amiodarone for A. fib\par --Follow up CT chest with contrast on 11/30/2018 revealed Postoperative changes of the left upper lung field with post radiation changes seen, more apparent at this time.\par Development of 4 small pulmonary nodules adjacent to this region as \par described, too small to characterize but likely postinflammatory.\par Emphysematous change.\par Groundglass nodule within the right lower lung without significant \par difference.\par Interval thrombosis of the left atrial appendage.\par --Marie did not follow up after the CT scan, because she reports she was feeling well and did not want to come in over the winter, importance of timely follow up was reiterated \par --Follow up CT ordered on 4/9/2019\par --MRI esthela 11/27/2018 \par 1.  No MRI evidence of intracranial metastatic disease. Stable exam since \par 12/18/2017.\par 2.  Stable mild chronic microvascular changes.\par \par RLL 15mm ground-glass nodule seen on CT chest from 12/18/2017 \par -- on 5/25/2018: COMPARISON : PET CT 8/28/2017  Status post left apical segmentectomy and mediastinal lymph node  dissection (11/16/2018). No abnormal FDG uptake to suggest  residual/recurrent biologic tumor activity.\par --Follow up CT ordered on 4/9/2019 \par \par ?Thrombosis in the L atrial appendage\par --Follow up CT with contrast of the chest ordered on 4/9/2019 \par --Echo of the heart ordered on 4/9/2019 \par --Importance of follow up with cardiology reiterated \par \par Follow up in 2-3 weeks after CT scan \par \par

## 2019-04-09 NOTE — REVIEW OF SYSTEMS
[SOB on Exertion] : shortness of breath during exertion [Negative] : Allergic/Immunologic [Fever] : no fever [Chills] : no chills [Night Sweats] : no night sweats [Fatigue] : no fatigue [Wheezing] : no wheezing [Shortness Of Breath] : no shortness of breath [Recent Change In Weight] : ~T no recent weight change [Cough] : no cough

## 2019-04-09 NOTE — HISTORY OF PRESENT ILLNESS
[de-identified] : Marie is a luc, fully functional 73 yo lady referred for evaluation of NSCCL by Dr. Smith. \par Marie initially presented for evaluation of progressive SOB and was noted to have severe mitral valve regurgitation. During her evaluation at the time CT for evaluation of coronary artery arthrosclerosis on 8/10/2017 revealed 1. Left upper lobe spiculated 2.8 cm pulmonary nodules suspicious for neoplasm.    2. Right hepatic lobe 2.2 cm hypoattenuating lesion, not fully characterized.  Outpatient, nonemergent evaluation with MRI abdomen with and without IV contrast  recommended.    3. Additional bilateral pulmonary nodules measuring up to 0.4 cm.    4. Centrilobular emphysema.    5. Aortic calcifications. \par Follow up PET CT on 8/28/2017 revealed  abnormal FDG uptake within a spiculated 2.8 cm left upper lobe nodule, max SUV  5.5 consistent with biologic active FDG avid neoplasm.    No other sites of abnormal FDG uptake.    Non-FDG avid right hepatic lobe 2.2 cm hypoattenuating lesion, not fully  characterized. Further evaluation with MRI abdomen with and without IV contrast recommended.\par She then underwent successful mitral valve repair by Dr. Youssef on 9/14/2017 and on 12/8/2017 left apical segmentectomy and mediastinal node dissection by Dr. Smith. \par Pathology revealed a LUIS ARMANDO adenocarcinoma, acinar predominant, micropapillary predominant, poorly differentiated, 3.6cm is greatest dimension, without pleural invasion, LVI, with negative margins = pT2a, 4 nodes (level 5, level 12) out of 15 examined were positive for adenocarcinoma =pN2.\par Therefore this is at least IIIa ([id number]) disease with N2 involvement. \par She is also managed for A. fib with amiodarone and full ASA, Coumadin has been discontinued.  [de-identified] : 12/8/2017: Daksha has recovered well from surgery. She only feels "winded" with climbing steps, she went back to work in [place]. She is not requiring oxygen at home. She reports no pain. With her 2 surgeries she has lost minimal amount of weight. She feels well. Reports no new bony pains, no neurological symptoms, no GI or  symptoms. \par We have at length discussed the risks and benefits of adjuvant chemotherapy in the setting of stage IIIa disease with N2 mansoor involvement. \par Marie is reluctant to receive cisplatin based therapy given the risks of permanent renal failure and ototoxicity we have discussed. \par We have discussed the carboplatin backbone with Alimta regiment, followed by consideration of adjuvant radiation therapy to the mediastinum. We have also briefly discussed potential eligibility for the Alchemist trial. \par However given Marie's last PET CT was 4 months ago, I will offer restaging scans including CT C/A/P and MRI or the brain prior to initiation of adjuvant therapy. \par \par 1/16/2018: Marie had CT C/A/P and MRI of the brain done, both did not reveal recurrent disease. There are 2 small pulmonary nodules, which will warrant continued follow up. These findings were discussed. She is taking folic acid and has already received vitamin B12 injection. She wishes to try to continue to work during chemotherapy.\par \par 2/9/2018: Marie is s/p 1st dose of C on Carbo/Alimta on 1/18/2018, she did not come in for Neulasta, her counts are adequate today. Will hold Neulasta for this cycle. Marie is taking folic acid every day and continues to work. She was educated regarding PO Decadron post chemotherapy. \par \par 3/2/2018: Marie is doing really well today. She continues to work, she had few episodes of nausea, no vomiting, no mouth sores, no neuropathy. For cycle 3 today, will administer on body Neulasta and B12 today. \par \par 3/23/2018: No complaints, she is tolerating chemotherapy very well. Will consider Alchemist trial participation. \par \par 4/20/2018: Marie has completed 4 cycles of adjuvant Carbo/Alimta on 3/23/2018. She has tolerated chemotherapy without any difficulty. She did not hear from Rad Onc yet and will be referred today, case was discussed with Dr. Juarez. She also may be interested in Alchemist trial participation, consent was provided for her review. \par \par 5/29/2018: Marie is doing great, she was awaiting restaging scans prior to starting on XRT. These were just completed, results were reviewed today. MRI of the liver on 5/26/2018 revealed 2.1 cm right hepatic lobe hemangioma. No suspicious liver lesions. PET CT on 5/25/2018 revealed COMPARISON : PET CT 8/28/2017  Status post left apical segmentectomy and mediastinal lymph node  dissection (11/16/2018). No abnormal FDG uptake to suggest  residual/recurrent biologic tumor activity. She was referred to f/u with radiation ASAP. We have also briefly discussed the Alchemist trial participation. \par \par 8/22/2018: Marie has completed 25 treatments of radiation to the chest on 7/27/2018, she has tolerated radiation well, reports some radiation induced esophagitis, that has now resoled. She continues to work and overall feels well. I have briefly discussed with her Alchemist trial participation, she reports she is very emotionally exhausted from dealing with cancer and for this reason may decline. She understands her odds of recurrence in the setting of N2 disease are quite high. \par \par 4/9/2019: Marie feels well and denies any complaints today, she has gained some weight and continues to work. Denies any new pain SOB.

## 2019-04-09 NOTE — CONSULT LETTER
[Dear  ___] : Dear  [unfilled], [Consult Letter:] : I had the pleasure of evaluating your patient, [unfilled]. [Consult Closing:] : Thank you very much for allowing me to participate in the care of this patient.  If you have any questions, please do not hesitate to contact me. [Sincerely,] : Sincerely, [DrOswald  ___] : Dr. LOCKWOOD [FreeTextEntry3] : Minda Lopez MD

## 2019-04-10 LAB
ALBUMIN SERPL ELPH-MCNC: 4.5 G/DL
ALP BLD-CCNC: 69 U/L
ALT SERPL-CCNC: 17 U/L
ANION GAP SERPL CALC-SCNC: 13 MMOL/L
AST SERPL-CCNC: 26 U/L
BILIRUB SERPL-MCNC: 0.4 MG/DL
BUN SERPL-MCNC: 18 MG/DL
CALCIUM SERPL-MCNC: 9.7 MG/DL
CHLORIDE SERPL-SCNC: 101 MMOL/L
CO2 SERPL-SCNC: 25 MMOL/L
CREAT SERPL-MCNC: 1 MG/DL
GLUCOSE SERPL-MCNC: 89 MG/DL
HCT VFR BLD CALC: 39.8 %
HGB BLD-MCNC: 13.5 G/DL
MCHC RBC-ENTMCNC: 32.1 PG
MCHC RBC-ENTMCNC: 33.9 G/DL
MCV RBC AUTO: 94.5 FL
PLATELET # BLD AUTO: 213 K/UL
PMV BLD: 8.5 FL
POTASSIUM SERPL-SCNC: 4.4 MMOL/L
PROT SERPL-MCNC: 7.2 G/DL
RBC # BLD: 4.21 M/UL
RBC # FLD: 13.7 %
SODIUM SERPL-SCNC: 139 MMOL/L
WBC # FLD AUTO: 7.38 K/UL

## 2019-04-19 ENCOUNTER — OUTPATIENT (OUTPATIENT)
Dept: OUTPATIENT SERVICES | Facility: HOSPITAL | Age: 74
LOS: 1 days | Discharge: HOME | End: 2019-04-19

## 2019-04-19 DIAGNOSIS — C34.90 MALIGNANT NEOPLASM OF UNSPECIFIED PART OF UNSPECIFIED BRONCHUS OR LUNG: ICD-10-CM

## 2019-04-23 ENCOUNTER — OUTPATIENT (OUTPATIENT)
Dept: OUTPATIENT SERVICES | Facility: HOSPITAL | Age: 74
LOS: 1 days | Discharge: HOME | End: 2019-04-23
Payer: MEDICARE

## 2019-04-23 DIAGNOSIS — C34.90 MALIGNANT NEOPLASM OF UNSPECIFIED PART OF UNSPECIFIED BRONCHUS OR LUNG: ICD-10-CM

## 2019-04-23 PROCEDURE — 71260 CT THORAX DX C+: CPT | Mod: 26

## 2019-05-07 ENCOUNTER — APPOINTMENT (OUTPATIENT)
Dept: HEMATOLOGY ONCOLOGY | Facility: CLINIC | Age: 74
End: 2019-05-07

## 2019-05-07 ENCOUNTER — OUTPATIENT (OUTPATIENT)
Dept: OUTPATIENT SERVICES | Facility: HOSPITAL | Age: 74
LOS: 1 days | Discharge: HOME | End: 2019-05-07

## 2019-05-07 VITALS
WEIGHT: 125 LBS | HEIGHT: 65 IN | BODY MASS INDEX: 20.83 KG/M2 | SYSTOLIC BLOOD PRESSURE: 129 MMHG | RESPIRATION RATE: 14 BRPM | TEMPERATURE: 96 F | DIASTOLIC BLOOD PRESSURE: 60 MMHG | HEART RATE: 74 BPM

## 2019-05-07 DIAGNOSIS — C34.90 MALIGNANT NEOPLASM OF UNSPECIFIED PART OF UNSPECIFIED BRONCHUS OR LUNG: ICD-10-CM

## 2019-05-07 NOTE — ASSESSMENT
[FreeTextEntry1] : Stage IIIa, N2 positive adenocarcinoma of the lung, EGFR missense mutation in exon 21 positive, Alk neg, ROS1 neg, PDL-1 0%\par --restaging CT C/A/P ordered and done on 12/14/2017 (last staging scans on 8/2017), MRI of the brain done on 12/18/2017\par --Adjuvant Carbo/Alimta on 1/18-3/23/2018, tolerated 4 cycles without any difficulty, with on body Neulasta\par --B12, folic acid supplementation \par --Completed adjuvant radiation to mediastinum given N2 disease on 7/27/2018, received 25 treatments\par --PET CT 5/25/2018 [facility]\par --MRI liver 5/26/2018 revealed hemangioma, no suspicious liver masses\par --Patient declined Alchemist trial participation, consent provided 4/20/2018 \par --She continues on baby ASA as well as amiodarone for A. fib\par --Follow up CT chest with contrast on 11/30/2018 revealed Postoperative changes of the left upper lung field with post radiation changes seen, more apparent at this time.\par Development of 4 small pulmonary nodules adjacent to this region as \par described, too small to characterize but likely postinflammatory.\par Emphysematous change.\par Groundglass nodule within the right lower lung without significant \par difference.\par Interval thrombosis of the left atrial appendage.\par --Follow up CT chest on 4/29/2019 revealed stable findings \par --MRI esthela 11/27/2018 \par 1.  No MRI evidence of intracranial metastatic disease. Stable exam since \par 12/18/2017.\par 2.  Stable mild chronic microvascular changes.\par \par RLL 15mm ground-glass nodule seen on CT chest from 12/18/2017 \par -- on 5/25/2018: COMPARISON : PET CT 8/28/2017  Status post left apical segmentectomy and mediastinal lymph node  dissection (11/16/2018). No abnormal FDG uptake to suggest  residual/recurrent biologic tumor activity.\par --Follow up CT chest on 4/29/2019 appears normal \par \par Thrombosis in the L atrial appendage is an expected finding post valve surgery, this was discussed with CT surgery, who reviewed op report, confirming that LA appendage was in fact closed intraop and thrombosis is expected\par --Echo essentially normal, she will review results with cardiology \par --Importance of follow up with cardiology reiterated \par \par Follow up in 3 months or earlier if neded \par \par

## 2019-05-07 NOTE — REVIEW OF SYSTEMS
[SOB on Exertion] : shortness of breath during exertion [Negative] : Allergic/Immunologic [Fever] : no fever [Chills] : no chills [Night Sweats] : no night sweats [Fatigue] : no fatigue [Shortness Of Breath] : no shortness of breath [Recent Change In Weight] : ~T no recent weight change [Wheezing] : no wheezing [Cough] : no cough

## 2019-05-07 NOTE — HISTORY OF PRESENT ILLNESS
[de-identified] : 12/8/2017: Daksha has recovered well from surgery. She only feels "winded" with climbing steps, she went back to work in [place]. She is not requiring oxygen at home. She reports no pain. With her 2 surgeries she has lost minimal amount of weight. She feels well. Reports no new bony pains, no neurological symptoms, no GI or  symptoms. \par We have at length discussed the risks and benefits of adjuvant chemotherapy in the setting of stage IIIa disease with N2 mansoor involvement. \par Marie is reluctant to receive cisplatin based therapy given the risks of permanent renal failure and ototoxicity we have discussed. \par We have discussed the carboplatin backbone with Alimta regiment, followed by consideration of adjuvant radiation therapy to the mediastinum. We have also briefly discussed potential eligibility for the Alchemist trial. \par However given Marie's last PET CT was 4 months ago, I will offer restaging scans including CT C/A/P and MRI or the brain prior to initiation of adjuvant therapy. \par \par 1/16/2018: Marie had CT C/A/P and MRI of the brain done, both did not reveal recurrent disease. There are 2 small pulmonary nodules, which will warrant continued follow up. \par These findings were discussed. She is taking folic acid and has already received vitamin B12 injection. She wishes to try to continue to work during chemotherapy.\par \par 2/9/2018: Marie is s/p 1st dose of C on Carbo/Alimta on 1/18/2018, she did not come in for Neulasta, her counts are adequate today. Will hold Neulasta for this cycle. Marie is taking folic acid every day and continues to work. She was educated regarding PO Decadron post chemotherapy. \par \par 3/2/2018: Marie is doing really well today. She continues to work, she had few episodes of nausea, no vomiting, no mouth sores, no neuropathy. For cycle 3 today, will administer on body Neulasta and B12 today. \par \par 3/23/2018: No complaints, she is tolerating chemotherapy very well. Will consider Alchemist trial participation. \par \par 4/20/2018: Marie has completed 4 cycles of adjuvant Carbo/Alimta on 3/23/2018. She has tolerated chemotherapy without any difficulty. She did not hear from Rad Onc yet and will be referred today, case was discussed with Dr. Juarez. She also may be interested in Alchemist trial participation, consent was provided for her review. \par \par 5/29/2018: Marie is doing great, she was awaiting restaging scans prior to starting on XRT. These were just completed, results were reviewed today. MRI of the liver on 5/26/2018 revealed 2.1 cm right hepatic lobe hemangioma. No suspicious liver lesions. PET CT on 5/25/2018 revealed COMPARISON : PET CT 8/28/2017  Status post left apical segmentectomy and mediastinal lymph node  dissection (11/16/2018). No abnormal FDG uptake to suggest  residual/recurrent biologic tumor activity. She was referred to f/u with radiation ASAP. We have also briefly discussed the Alchemist trial participation. \par \par 8/22/2018: Marie has completed 25 treatments of radiation to the chest on 7/27/2018, she has tolerated radiation well, reports some radiation induced esophagitis, that has now resoled. She continues to work and overall feels well. I have briefly discussed with her Alchemist trial participation, she reports she is very emotionally exhausted from dealing with cancer and for this reason may decline. She understands her odds of recurrence in the setting of N2 disease are quite high. \par \par 4/9/2019: Marie feels well and denies any complaints today, she has gained some weight and continues to work. Denies any new pain SOB. \par \par 5/7/19 : Marie came for a follow up visit, she reports feeling well and offers no complaints at all. CT chest from 4/23/19 with stable result was reviewed with patient, images personally reviewed by John. Marie denied SOB\par CT chest revealed on 4/23/2019\par 1. Stable findings of bronchiectasis and scarring in left upper lung, \par related to previous surgery. \par 2. Decreased loculated left pleural effusion. \par 3. No new lung findings\par Marie also had an Echo which was mostly normal, she was advised to further discuss it with her cardiologist.  [de-identified] : Marie is a luc, fully functional 73 yo lady referred for evaluation of NSCCL by Dr. Smith. \par Marie initially presented for evaluation of progressive SOB and was noted to have severe mitral valve regurgitation. During her evaluation at the time CT for evaluation of coronary artery arthrosclerosis on 8/10/2017 revealed 1. Left upper lobe spiculated 2.8 cm pulmonary nodules suspicious for neoplasm.    2. Right hepatic lobe 2.2 cm hypoattenuating lesion, not fully characterized.  Outpatient, nonemergent evaluation with MRI abdomen with and without IV contrast  recommended.    3. Additional bilateral pulmonary nodules measuring up to 0.4 cm.    4. Centrilobular emphysema.    5. Aortic calcifications. \par Follow up PET CT on 8/28/2017 revealed  abnormal FDG uptake within a spiculated 2.8 cm left upper lobe nodule, max SUV  5.5 consistent with biologic active FDG avid neoplasm.    No other sites of abnormal FDG uptake.    Non-FDG avid right hepatic lobe 2.2 cm hypoattenuating lesion, not fully  characterized. Further evaluation with MRI abdomen with and without IV contrast recommended.\par She then underwent successful mitral valve repair by Dr. Youssef on 9/14/2017 and on 12/8/2017 left apical segmentectomy and mediastinal node dissection by Dr. Smith. \par Pathology revealed a LUIS ARMANDO adenocarcinoma, acinar predominant, micropapillary predominant, poorly differentiated, 3.6cm is greatest dimension, without pleural invasion, LVI, with negative margins = pT2a, 4 nodes (level 5, level 12) out of 15 examined were positive for adenocarcinoma =pN2.\par Therefore this is at least IIIa ([id number]) disease with N2 involvement. \par She is also managed for A. fib with amiodarone and full ASA, Coumadin has been discontinued.

## 2019-09-10 ENCOUNTER — APPOINTMENT (OUTPATIENT)
Dept: HEMATOLOGY ONCOLOGY | Facility: CLINIC | Age: 74
End: 2019-09-10
Payer: MEDICARE

## 2019-09-10 ENCOUNTER — LABORATORY RESULT (OUTPATIENT)
Age: 74
End: 2019-09-10

## 2019-09-10 VITALS
HEART RATE: 70 BPM | WEIGHT: 121 LBS | SYSTOLIC BLOOD PRESSURE: 135 MMHG | TEMPERATURE: 96 F | BODY MASS INDEX: 20.66 KG/M2 | DIASTOLIC BLOOD PRESSURE: 60 MMHG | HEIGHT: 64 IN | RESPIRATION RATE: 14 BRPM

## 2019-09-10 PROCEDURE — 99212 OFFICE O/P EST SF 10 MIN: CPT

## 2019-09-10 RX ORDER — FOLIC ACID 1 MG/1
1 TABLET ORAL DAILY
Qty: 30 | Refills: 6 | Status: COMPLETED | COMMUNITY
Start: 2017-12-08 | End: 2019-09-10

## 2019-09-10 RX ORDER — ONDANSETRON 8 MG/1
8 TABLET ORAL EVERY 8 HOURS
Qty: 30 | Refills: 3 | Status: COMPLETED | COMMUNITY
Start: 2018-01-16 | End: 2019-09-10

## 2019-09-10 RX ORDER — DEXAMETHASONE 4 MG/1
4 TABLET ORAL
Qty: 4 | Refills: 5 | Status: COMPLETED | COMMUNITY
Start: 2018-02-09 | End: 2019-09-10

## 2019-09-10 RX ORDER — PROCHLORPERAZINE MALEATE 10 MG/1
10 TABLET ORAL EVERY 6 HOURS
Qty: 30 | Refills: 3 | Status: COMPLETED | COMMUNITY
Start: 2018-01-16 | End: 2019-09-10

## 2019-09-11 LAB
ALBUMIN SERPL ELPH-MCNC: 4.5 G/DL
ALP BLD-CCNC: 69 U/L
ALT SERPL-CCNC: 15 U/L
ANION GAP SERPL CALC-SCNC: 13 MMOL/L
AST SERPL-CCNC: 19 U/L
BILIRUB SERPL-MCNC: 0.3 MG/DL
BUN SERPL-MCNC: 18 MG/DL
CALCIUM SERPL-MCNC: 10.1 MG/DL
CHLORIDE SERPL-SCNC: 104 MMOL/L
CO2 SERPL-SCNC: 27 MMOL/L
CREAT SERPL-MCNC: 1.1 MG/DL
GLUCOSE SERPL-MCNC: 66 MG/DL
HCT VFR BLD CALC: 39.6 %
HGB BLD-MCNC: 13.1 G/DL
MCHC RBC-ENTMCNC: 31.4 PG
MCHC RBC-ENTMCNC: 33.1 G/DL
MCV RBC AUTO: 95 FL
PLATELET # BLD AUTO: 181 K/UL
PMV BLD: 8.7 FL
POTASSIUM SERPL-SCNC: 4.5 MMOL/L
PROT SERPL-MCNC: 7.1 G/DL
RBC # BLD: 4.17 M/UL
RBC # FLD: 13.6 %
SODIUM SERPL-SCNC: 144 MMOL/L
WBC # FLD AUTO: 5.47 K/UL

## 2019-09-20 ENCOUNTER — OUTPATIENT (OUTPATIENT)
Dept: OUTPATIENT SERVICES | Facility: HOSPITAL | Age: 74
LOS: 1 days | Discharge: HOME | End: 2019-09-20
Payer: MEDICARE

## 2019-09-20 DIAGNOSIS — C34.90 MALIGNANT NEOPLASM OF UNSPECIFIED PART OF UNSPECIFIED BRONCHUS OR LUNG: ICD-10-CM

## 2019-09-20 PROCEDURE — 71250 CT THORAX DX C-: CPT | Mod: 26

## 2019-10-05 NOTE — REVIEW OF SYSTEMS
[SOB on Exertion] : shortness of breath during exertion [Negative] : Heme/Lymph [Chills] : no chills [Fever] : no fever [Night Sweats] : no night sweats [Fatigue] : no fatigue [Recent Change In Weight] : ~T no recent weight change [Shortness Of Breath] : no shortness of breath [Cough] : no cough [Wheezing] : no wheezing

## 2019-10-05 NOTE — HISTORY OF PRESENT ILLNESS
[de-identified] : Marie is a luc, fully functional 73 yo lady referred for evaluation of NSCCL by Dr. Smith. \par Marie initially presented for evaluation of progressive SOB and was noted to have severe mitral valve regurgitation. During her evaluation at the time CT for evaluation of coronary artery arthrosclerosis on 8/10/2017 revealed 1. Left upper lobe spiculated 2.8 cm pulmonary nodules suspicious for neoplasm.    2. Right hepatic lobe 2.2 cm hypoattenuating lesion, not fully characterized.  Outpatient, nonemergent evaluation with MRI abdomen with and without IV contrast  recommended.    3. Additional bilateral pulmonary nodules measuring up to 0.4 cm.    4. Centrilobular emphysema.    5. Aortic calcifications. \par Follow up PET CT on 8/28/2017 revealed  abnormal FDG uptake within a spiculated 2.8 cm left upper lobe nodule, max SUV  5.5 consistent with biologic active FDG avid neoplasm.    No other sites of abnormal FDG uptake.    Non-FDG avid right hepatic lobe 2.2 cm hypoattenuating lesion, not fully  characterized. Further evaluation with MRI abdomen with and without IV contrast recommended.\par She then underwent successful mitral valve repair by Dr. Youssef on 9/14/2017 and on 12/8/2017 left apical segmentectomy and mediastinal node dissection by Dr. Smith. \par Pathology revealed a LUIS ARMANDO adenocarcinoma, acinar predominant, micropapillary predominant, poorly differentiated, 3.6cm is greatest dimension, without pleural invasion, LVI, with negative margins = pT2a, 4 nodes (level 5, level 12) out of 15 examined were positive for adenocarcinoma =pN2.\par Therefore this is at least IIIa ([id number]) disease with N2 involvement. \par She is also managed for A. fib with amiodarone and full ASA, Coumadin has been discontinued.  [de-identified] : 12/8/2017: Daksha has recovered well from surgery. She only feels "winded" with climbing steps, she went back to work in [place]. She is not requiring oxygen at home. She reports no pain. With her 2 surgeries she has lost minimal amount of weight. She feels well. Reports no new bony pains, no neurological symptoms, no GI or  symptoms. \par We have at length discussed the risks and benefits of adjuvant chemotherapy in the setting of stage IIIa disease with N2 mansoor involvement. \par Marie is reluctant to receive cisplatin based therapy given the risks of permanent renal failure and ototoxicity we have discussed. \par We have discussed the carboplatin backbone with Alimta regiment, followed by consideration of adjuvant radiation therapy to the mediastinum. We have also briefly discussed potential eligibility for the Alchemist trial. \par However given Marie's last PET CT was 4 months ago, I will offer restaging scans including CT C/A/P and MRI or the brain prior to initiation of adjuvant therapy. \par \par 1/16/2018: Marie had CT C/A/P and MRI of the brain done, both did not reveal recurrent disease. There are 2 small pulmonary nodules, which will warrant continued follow up. \par These findings were discussed. She is taking folic acid and has already received vitamin B12 injection. She wishes to try to continue to work during chemotherapy.\par \par 2/9/2018: Marie is s/p 1st dose of C on Carbo/Alimta on 1/18/2018, she did not come in for Neulasta, her counts are adequate today. Will hold Neulasta for this cycle. Marie is taking folic acid every day and continues to work. She was educated regarding PO Decadron post chemotherapy. \par \par 3/2/2018: Marie is doing really well today. She continues to work, she had few episodes of nausea, no vomiting, no mouth sores, no neuropathy. For cycle 3 today, will administer on body Neulasta and B12 today. \par \par 3/23/2018: No complaints, she is tolerating chemotherapy very well. Will consider Alchemist trial participation. \par \par 4/20/2018: Marie has completed 4 cycles of adjuvant Carbo/Alimta on 3/23/2018. She has tolerated chemotherapy without any difficulty. She did not hear from Rad Onc yet and will be referred today, case was discussed with Dr. Juarez. She also may be interested in Alchemist trial participation, consent was provided for her review. \par \par 5/29/2018: Marie is doing great, she was awaiting restaging scans prior to starting on XRT. These were just completed, results were reviewed today. MRI of the liver on 5/26/2018 revealed 2.1 cm right hepatic lobe hemangioma. No suspicious liver lesions. PET CT on 5/25/2018 revealed COMPARISON : PET CT 8/28/2017  Status post left apical segmentectomy and mediastinal lymph node  dissection (11/16/2018). No abnormal FDG uptake to suggest  residual/recurrent biologic tumor activity. She was referred to f/u with radiation ASAP. We have also briefly discussed the Alchemist trial participation. \par \par 8/22/2018: Marie has completed 25 treatments of radiation to the chest on 7/27/2018, she has tolerated radiation well, reports some radiation induced esophagitis, that has now resoled. She continues to work and overall feels well. I have briefly discussed with her Alchemist trial participation, she reports she is very emotionally exhausted from dealing with cancer and for this reason may decline. She understands her odds of recurrence in the setting of N2 disease are quite high. \par \par 4/9/2019: Marie feels well and denies any complaints today, she has gained some weight and continues to work. Denies any new pain SOB. \par \par 5/7/19 : Marie came for a follow up visit, she reports feeling well and offers no complaints at all. CT chest from 4/23/19 with stable result was reviewed with patient, images personally reviewed by John. Marie denied SOB\par CT chest revealed on 4/23/2019\par 1. Stable findings of bronchiectasis and scarring in left upper lung, \par related to previous surgery. \par 2. Decreased loculated left pleural effusion. \par 3. No new lung findings\par Marie also had an Echo which was mostly normal, she was advised to further discuss it with her cardiologist. \par \par 9/10/2019: Marie continues to feel well, she continues to work and denies any complaints. She is due for follow up CT of her chest, this will be ordered today.

## 2019-10-05 NOTE — ASSESSMENT
[FreeTextEntry1] : Stage IIIa, N2 positive adenocarcinoma of the lung, EGFR missense mutation in exon 21 positive, Alk neg, ROS1 neg, PDL-1 0%\par --restaging CT C/A/P ordered and done on 12/14/2017 (last staging scans on 8/2017), MRI of the brain done on 12/18/2017\par --Adjuvant Carbo/Alimta on 1/18-3/23/2018, tolerated 4 cycles without any difficulty, with on body Neulasta\par --B12, folic acid supplementation \par --Completed adjuvant radiation to mediastinum given N2 disease on 7/27/2018, received 25 treatments\par --PET CT 5/25/2018 [facility]\par --MRI liver 5/26/2018 revealed hemangioma, no suspicious liver masses\par --Patient declined Alchemist trial participation, consent provided 4/20/2018 \par --She continues on baby ASA as well as amiodarone for A. fib\par --Follow up CT chest with contrast on 11/30/2018 revealed Postoperative changes of the left upper lung field with post radiation changes seen, more apparent at this time.\par Development of 4 small pulmonary nodules adjacent to this region as \par described, too small to characterize but likely postinflammatory.\par Emphysematous change.\par Groundglass nodule within the right lower lung without significant \par difference.\par Interval thrombosis of the left atrial appendage.\par --Follow up CT chest on 4/29/2019 revealed stable findings \par --MRI esthela 11/27/2018 \par 1.  No MRI evidence of intracranial metastatic disease. Stable exam since \par 12/18/2017.\par 2.  Stable mild chronic microvascular changes.\par --Follow up CT chest was ordered on 9/10/2019\par \par RLL 15mm ground-glass nodule seen on CT chest from 12/18/2017 \par -- on 5/25/2018: COMPARISON : PET CT 8/28/2017  Status post left apical segmentectomy and mediastinal lymph node  dissection (11/16/2018). No abnormal FDG uptake to suggest  residual/recurrent biologic tumor activity.\par --Follow up CT chest on 4/29/2019 appears normal \par \par Thrombosis in the L atrial appendage is an expected finding post valve surgery, this was discussed with CT surgery, who reviewed op report, confirming that LA appendage was in fact closed intraop and thrombosis is expected\par --Echo essentially normal, she will review results with cardiology \par --Importance of follow up with cardiology reiterated \par \par Follow up after CT chest \par \par

## 2019-11-19 ENCOUNTER — APPOINTMENT (OUTPATIENT)
Dept: HEMATOLOGY ONCOLOGY | Facility: CLINIC | Age: 74
End: 2019-11-19
Payer: MEDICARE

## 2019-11-19 ENCOUNTER — OUTPATIENT (OUTPATIENT)
Dept: OUTPATIENT SERVICES | Facility: HOSPITAL | Age: 74
LOS: 1 days | Discharge: HOME | End: 2019-11-19

## 2019-11-19 ENCOUNTER — LABORATORY RESULT (OUTPATIENT)
Age: 74
End: 2019-11-19

## 2019-11-19 VITALS
RESPIRATION RATE: 14 BRPM | SYSTOLIC BLOOD PRESSURE: 139 MMHG | DIASTOLIC BLOOD PRESSURE: 58 MMHG | BODY MASS INDEX: 21 KG/M2 | WEIGHT: 123 LBS | HEART RATE: 82 BPM | HEIGHT: 64 IN | TEMPERATURE: 96.2 F

## 2019-11-19 DIAGNOSIS — C34.90 MALIGNANT NEOPLASM OF UNSPECIFIED PART OF UNSPECIFIED BRONCHUS OR LUNG: ICD-10-CM

## 2019-11-19 LAB
HCT VFR BLD CALC: 36.2 %
HGB BLD-MCNC: 12.1 G/DL
MCHC RBC-ENTMCNC: 31.6 PG
MCHC RBC-ENTMCNC: 33.4 G/DL
MCV RBC AUTO: 94.5 FL
PLATELET # BLD AUTO: 279 K/UL
PMV BLD: 8.7 FL
RBC # BLD: 3.83 M/UL
RBC # FLD: 13.3 %
WBC # FLD AUTO: 7.28 K/UL

## 2019-11-19 PROCEDURE — 99214 OFFICE O/P EST MOD 30 MIN: CPT

## 2019-11-19 NOTE — REVIEW OF SYSTEMS
[SOB on Exertion] : shortness of breath during exertion [Negative] : Allergic/Immunologic [Fever] : no fever [Night Sweats] : no night sweats [Chills] : no chills [Fatigue] : no fatigue [Recent Change In Weight] : ~T no recent weight change [Shortness Of Breath] : no shortness of breath [Cough] : no cough [Wheezing] : no wheezing

## 2019-11-19 NOTE — ASSESSMENT
[FreeTextEntry1] : Stage IIIa, N2 positive adenocarcinoma of the lung, EGFR missense mutation in exon 21 positive, Alk neg, ROS1 neg, PDL-1 0%\par --restaging CT C/A/P ordered and done on 12/14/2017 (last staging scans on 8/2017), MRI of the brain done on 12/18/2017\par --Adjuvant Carbo/Alimta on 1/18-3/23/2018, tolerated 4 cycles without any difficulty, with on body Neulasta\par --B12, folic acid supplementation \par --Completed adjuvant radiation to mediastinum given N2 disease on 7/27/2018, received 25 treatments\par --PET CT 5/25/2018 [facility]\par --MRI liver 5/26/2018 revealed hemangioma, no suspicious liver masses\par --Patient declined Alchemist trial participation, consent provided 4/20/2018 \par --She continues on baby ASA as well as amiodarone for A. fib\par --Follow up CT chest with contrast on 9/22/19 with stable  Postoperative changes, stable finding.  \par --MRI esthela 11/27/2018 \par 1.  No MRI evidence of intracranial metastatic disease. Stable exam since \par 12/18/2017.\par 2.  Stable mild chronic microvascular changes.\par --Follow up CT chest on 4/29/2019 on 9/22/2019 appear stable\par --Follow up scan for 3/2020 was ordered on 11/19/2019 \par \par Thrombosis in the L atrial appendage is an expected finding post valve surgery, this was discussed with CT surgery, who reviewed op report, confirming that LA appendage was in fact closed intraop and thrombosis is expected\par --Echo essentially normal, she will review results with cardiology \par --Importance of follow up with cardiology reiterated \par \par Patient will remain on observation . \par Follow up after CT chest in March, 2020 . \par \par She will follow up with Dr Lopez in march, 2020. \par Patient seen and examined by Dr Lopez who agreed for the above plan of care.

## 2019-11-19 NOTE — HISTORY OF PRESENT ILLNESS
[de-identified] : Marie is a luc, fully functional 71 yo lady referred for evaluation of NSCCL by Dr. Smith. \par Marie initially presented for evaluation of progressive SOB and was noted to have severe mitral valve regurgitation. During her evaluation at the time CT for evaluation of coronary artery arthrosclerosis on 8/10/2017 revealed 1. Left upper lobe spiculated 2.8 cm pulmonary nodules suspicious for neoplasm.    2. Right hepatic lobe 2.2 cm hypoattenuating lesion, not fully characterized.  Outpatient, nonemergent evaluation with MRI abdomen with and without IV contrast  recommended.    3. Additional bilateral pulmonary nodules measuring up to 0.4 cm.    4. Centrilobular emphysema.    5. Aortic calcifications. \par Follow up PET CT on 8/28/2017 revealed  abnormal FDG uptake within a spiculated 2.8 cm left upper lobe nodule, max SUV  5.5 consistent with biologic active FDG avid neoplasm.    No other sites of abnormal FDG uptake.    Non-FDG avid right hepatic lobe 2.2 cm hypoattenuating lesion, not fully  characterized. Further evaluation with MRI abdomen with and without IV contrast recommended.\par She then underwent successful mitral valve repair by Dr. Youssef on 9/14/2017 and on 12/8/2017 left apical segmentectomy and mediastinal node dissection by Dr. Smith. \par Pathology revealed a LUIS ARMANDO adenocarcinoma, acinar predominant, micropapillary predominant, poorly differentiated, 3.6cm is greatest dimension, without pleural invasion, LVI, with negative margins = pT2a, 4 nodes (level 5, level 12) out of 15 examined were positive for adenocarcinoma =pN2.\par Therefore this is at least IIIa ([id number]) disease with N2 involvement. \par She is also managed for A. fib with amiodarone and full ASA, Coumadin has been discontinued.  [de-identified] : 12/8/2017: Daksha has recovered well from surgery. She only feels "winded" with climbing steps, she went back to work in [place]. She is not requiring oxygen at home. She reports no pain. With her 2 surgeries she has lost minimal amount of weight. She feels well. Reports no new bony pains, no neurological symptoms, no GI or  symptoms. \par We have at length discussed the risks and benefits of adjuvant chemotherapy in the setting of stage IIIa disease with N2 mansoor involvement. \par Marie is reluctant to receive cisplatin based therapy given the risks of permanent renal failure and ototoxicity we have discussed. \par We have discussed the carboplatin backbone with Alimta regiment, followed by consideration of adjuvant radiation therapy to the mediastinum. We have also briefly discussed potential eligibility for the Alchemist trial. \par However given Marie's last PET CT was 4 months ago, I will offer restaging scans including CT C/A/P and MRI or the brain prior to initiation of adjuvant therapy. \par \par 1/16/2018: Marie had CT C/A/P and MRI of the brain done, both did not reveal recurrent disease. There are 2 small pulmonary nodules, which will warrant continued follow up. \par These findings were discussed. She is taking folic acid and has already received vitamin B12 injection. She wishes to try to continue to work during chemotherapy.\par \par 2/9/2018: Marie is s/p 1st dose of C on Carbo/Alimta on 1/18/2018, she did not come in for Neulasta, her counts are adequate today. Will hold Neulasta for this cycle. Marie is taking folic acid every day and continues to work. She was educated regarding PO Decadron post chemotherapy. \par \par 3/2/2018: Marie is doing really well today. She continues to work, she had few episodes of nausea, no vomiting, no mouth sores, no neuropathy. For cycle 3 today, will administer on body Neulasta and B12 today. \par \par 3/23/2018: No complaints, she is tolerating chemotherapy very well. Will consider Alchemist trial participation. \par \par 4/20/2018: Marie has completed 4 cycles of adjuvant Carbo/Alimta on 3/23/2018. She has tolerated chemotherapy without any difficulty. She did not hear from Rad Onc yet and will be referred today, case was discussed with Dr. Juarez. She also may be interested in Alchemist trial participation, consent was provided for her review. \par \par 5/29/2018: Marie is doing great, she was awaiting restaging scans prior to starting on XRT. These were just completed, results were reviewed today. MRI of the liver on 5/26/2018 revealed 2.1 cm right hepatic lobe hemangioma. No suspicious liver lesions. PET CT on 5/25/2018 revealed COMPARISON : PET CT 8/28/2017  Status post left apical segmentectomy and mediastinal lymph node  dissection (11/16/2018). No abnormal FDG uptake to suggest  residual/recurrent biologic tumor activity. She was referred to f/u with radiation ASAP. We have also briefly discussed the Alchemist trial participation. \par \par 8/22/2018: Marie has completed 25 treatments of radiation to the chest on 7/27/2018, she has tolerated radiation well, reports some radiation induced esophagitis, that has now resoled. She continues to work and overall feels well. I have briefly discussed with her Alchemist trial participation, she reports she is very emotionally exhausted from dealing with cancer and for this reason may decline. She understands her odds of recurrence in the setting of N2 disease are quite high. \par \par 4/9/2019: Marie feels well and denies any complaints today, she has gained some weight and continues to work. Denies any new pain SOB. \par \par 5/7/19 : Marie came for a follow up visit, she reports feeling well and offers no complaints at all. CT chest from 4/23/19 with stable result was reviewed with patient, images personally reviewed by John. Marie denied SOB\par CT chest revealed on 4/23/2019\par 1. Stable findings of bronchiectasis and scarring in left upper lung, \par related to previous surgery. \par 2. Decreased loculated left pleural effusion. \par 3. No new lung findings\par Marie also had an Echo which was mostly normal, she was advised to further discuss it with her cardiologist. \par \par 9/10/2019: Marie continues to feel well, she continues to work and denies any complaints.\par  \par 11/19/19 : Patient came for a follow up visit, she reports feeling well. she works full time. She denies any complaints. \par We communicated results from CT chest done on 9/22/19, that revealed \par 1. No evidence for intrathoracic metastatic or recurrent malignant disease. \par 2. Stable postoperative changes, as above. \par She will remain on observation , we communicated CBC from 11/19/19 with normal Hemogram. \par Images were personally reviewed by MD John and discussed with patient. \par She is due for 6 month follow up CT of her chest in 3/2020, this was ordered today.\par

## 2020-03-31 ENCOUNTER — APPOINTMENT (OUTPATIENT)
Dept: HEMATOLOGY ONCOLOGY | Facility: CLINIC | Age: 75
End: 2020-03-31

## 2020-04-30 ENCOUNTER — OUTPATIENT (OUTPATIENT)
Dept: OUTPATIENT SERVICES | Facility: HOSPITAL | Age: 75
LOS: 1 days | Discharge: HOME | End: 2020-04-30
Payer: MEDICARE

## 2020-04-30 ENCOUNTER — RESULT REVIEW (OUTPATIENT)
Age: 75
End: 2020-04-30

## 2020-04-30 DIAGNOSIS — C34.90 MALIGNANT NEOPLASM OF UNSPECIFIED PART OF UNSPECIFIED BRONCHUS OR LUNG: ICD-10-CM

## 2020-04-30 PROCEDURE — 71250 CT THORAX DX C-: CPT | Mod: 26

## 2020-07-21 ENCOUNTER — APPOINTMENT (OUTPATIENT)
Dept: HEMATOLOGY ONCOLOGY | Facility: CLINIC | Age: 75
End: 2020-07-21
Payer: MEDICARE

## 2020-07-21 ENCOUNTER — OUTPATIENT (OUTPATIENT)
Dept: OUTPATIENT SERVICES | Facility: HOSPITAL | Age: 75
LOS: 1 days | Discharge: HOME | End: 2020-07-21

## 2020-07-21 ENCOUNTER — LABORATORY RESULT (OUTPATIENT)
Age: 75
End: 2020-07-21

## 2020-07-21 VITALS
DIASTOLIC BLOOD PRESSURE: 52 MMHG | BODY MASS INDEX: 22.02 KG/M2 | TEMPERATURE: 96.4 F | HEART RATE: 65 BPM | HEIGHT: 64 IN | RESPIRATION RATE: 14 BRPM | WEIGHT: 129 LBS | SYSTOLIC BLOOD PRESSURE: 111 MMHG

## 2020-07-21 PROCEDURE — 99214 OFFICE O/P EST MOD 30 MIN: CPT

## 2020-07-21 NOTE — HISTORY OF PRESENT ILLNESS
[de-identified] : 12/8/2017: Daksha has recovered well from surgery. She only feels "winded" with climbing steps, she went back to work in [place]. She is not requiring oxygen at home. She reports no pain. With her 2 surgeries she has lost minimal amount of weight. She feels well. Reports no new bony pains, no neurological symptoms, no GI or  symptoms. \par We have at length discussed the risks and benefits of adjuvant chemotherapy in the setting of stage IIIa disease with N2 mansoor involvement. \par Marie is reluctant to receive cisplatin based therapy given the risks of permanent renal failure and ototoxicity we have discussed. \par We have discussed the carboplatin backbone with Alimta regiment, followed by consideration of adjuvant radiation therapy to the mediastinum. We have also briefly discussed potential eligibility for the Alchemist trial. \par However given Marie's last PET CT was 4 months ago, I will offer restaging scans including CT C/A/P and MRI or the brain prior to initiation of adjuvant therapy. \par \par 1/16/2018: Marie had CT C/A/P and MRI of the brain done, both did not reveal recurrent disease. There are 2 small pulmonary nodules, which will warrant continued follow up. \par These findings were discussed. She is taking folic acid and has already received vitamin B12 injection. She wishes to try to continue to work during chemotherapy.\par \par 2/9/2018: Marie is s/p 1st dose of C on Carbo/Alimta on 1/18/2018, she did not come in for Neulasta, her counts are adequate today. Will hold Neulasta for this cycle. Marie is taking folic acid every day and continues to work. She was educated regarding PO Decadron post chemotherapy. \par \par 3/2/2018: Marie is doing really well today. She continues to work, she had few episodes of nausea, no vomiting, no mouth sores, no neuropathy. For cycle 3 today, will administer on body Neulasta and B12 today. \par \par 3/23/2018: No complaints, she is tolerating chemotherapy very well. Will consider Alchemist trial participation. \par \par 4/20/2018: Marie has completed 4 cycles of adjuvant Carbo/Alimta on 3/23/2018. She has tolerated chemotherapy without any difficulty. She did not hear from Rad Onc yet and will be referred today, case was discussed with Dr. Juarez. She also may be interested in Alchemist trial participation, consent was provided for her review. \par \par 5/29/2018: Marie is doing great, she was awaiting restaging scans prior to starting on XRT. These were just completed, results were reviewed today. MRI of the liver on 5/26/2018 revealed 2.1 cm right hepatic lobe hemangioma. No suspicious liver lesions. PET CT on 5/25/2018 revealed COMPARISON : PET CT 8/28/2017  Status post left apical segmentectomy and mediastinal lymph node  dissection (11/16/2018). No abnormal FDG uptake to suggest  residual/recurrent biologic tumor activity. She was referred to f/u with radiation ASAP. We have also briefly discussed the Alchemist trial participation. \par \par 8/22/2018: Marie has completed 25 treatments of radiation to the chest on 7/27/2018, she has tolerated radiation well, reports some radiation induced esophagitis, that has now resoled. She continues to work and overall feels well. I have briefly discussed with her Alchemist trial participation, she reports she is very emotionally exhausted from dealing with cancer and for this reason may decline. She understands her odds of recurrence in the setting of N2 disease are quite high. \par \par 4/9/2019: Marie feels well and denies any complaints today, she has gained some weight and continues to work. Denies any new pain SOB. \par \par 5/7/19 : Marie came for a follow up visit, she reports feeling well and offers no complaints at all. CT chest from 4/23/19 with stable result was reviewed with patient, images personally reviewed by John. Marie denied SOB\par CT chest revealed on 4/23/2019\par 1. Stable findings of bronchiectasis and scarring in left upper lung, \par related to previous surgery. \par 2. Decreased loculated left pleural effusion. \par 3. No new lung findings\par Marie also had an Echo which was mostly normal, she was advised to further discuss it with her cardiologist. \par \par 9/10/2019: Marie continues to feel well, she continues to work and denies any complaints.\par  \par 11/19/19 : Patient came for a follow up visit, she reports feeling well. she works full time. She denies any complaints. \par We communicated results from CT chest done on 9/22/19, that revealed \par 1. No evidence for intrathoracic metastatic or recurrent malignant disease. \par 2. Stable postoperative changes, as above. \par She will remain on observation , we communicated CBC from 11/19/19 with normal Hemogram. \par Images were personally reviewed by MD John and discussed with patient. \par She is due for 6 month follow up CT of her chest in 3/2020, this was ordered today.\par \par 7/21/2020 Marie is a 74 years old female came for  a follow up visit for Stage IIIa, N2 positive adenocarcinoma of the lung. She reports feeling well. She didn't attend to work since 3/21/20, 2/2 pandemic. \par CT chest on 4/30/20 revealed \par Posttreatment changes within the left lung without evidence for recurrence. \par No new suspicious mass or nodule. \par Images were personally reviewed by MD John and discussed with patient. \par Patient is due for another CT chest  in 11/2020, script was provided on 7/21/2020.  [de-identified] : Marie is a luc, fully functional 71 yo lady referred for evaluation of NSCCL by Dr. Smith. \par Marie initially presented for evaluation of progressive SOB and was noted to have severe mitral valve regurgitation. During her evaluation at the time CT for evaluation of coronary artery arthrosclerosis on 8/10/2017 revealed 1. Left upper lobe spiculated 2.8 cm pulmonary nodules suspicious for neoplasm.    2. Right hepatic lobe 2.2 cm hypoattenuating lesion, not fully characterized.  Outpatient, nonemergent evaluation with MRI abdomen with and without IV contrast  recommended.    3. Additional bilateral pulmonary nodules measuring up to 0.4 cm.    4. Centrilobular emphysema.    5. Aortic calcifications. \par Follow up PET CT on 8/28/2017 revealed  abnormal FDG uptake within a spiculated 2.8 cm left upper lobe nodule, max SUV  5.5 consistent with biologic active FDG avid neoplasm.    No other sites of abnormal FDG uptake.    Non-FDG avid right hepatic lobe 2.2 cm hypoattenuating lesion, not fully  characterized. Further evaluation with MRI abdomen with and without IV contrast recommended.\par She then underwent successful mitral valve repair by Dr. Youssef on 9/14/2017 and on 12/8/2017 left apical segmentectomy and mediastinal node dissection by Dr. Smith. \par Pathology revealed a LUIS ARMANDO adenocarcinoma, acinar predominant, micropapillary predominant, poorly differentiated, 3.6cm is greatest dimension, without pleural invasion, LVI, with negative margins = pT2a, 4 nodes (level 5, level 12) out of 15 examined were positive for adenocarcinoma =pN2.\par Therefore this is at least IIIa ([id number]) disease with N2 involvement. \par She is also managed for A. fib with amiodarone and full ASA, Coumadin has been discontinued.

## 2020-07-21 NOTE — END OF VISIT
[FreeTextEntry3] : Patient was seen seen examined with NP Gerda, agree with above plan of care.\par

## 2020-07-21 NOTE — REVIEW OF SYSTEMS
[SOB on Exertion] : shortness of breath during exertion [Negative] : Allergic/Immunologic [Fever] : no fever [Night Sweats] : no night sweats [Chills] : no chills [Recent Change In Weight] : ~T no recent weight change [Fatigue] : no fatigue [Shortness Of Breath] : no shortness of breath [Wheezing] : no wheezing [Cough] : no cough

## 2020-07-21 NOTE — ASSESSMENT
[FreeTextEntry1] : Stage IIIa, N2 positive adenocarcinoma of the lung, EGFR missense mutation in exon 21 positive, Alk neg, ROS1 neg, PDL-1 0%\par --restaging CT C/A/P ordered and done on 12/14/2017 (last staging scans on 8/2017), MRI of the brain done on 12/18/2017\par --Adjuvant Carbo/Alimta on 1/18-3/23/2018, tolerated 4 cycles without any difficulty, with on body Neulasta\par --B12, folic acid supplementation \par --Completed adjuvant radiation to mediastinum given N2 disease on 7/27/2018, received 25 treatments\par --PET CT 5/25/2018 [facility]\par --MRI liver 5/26/2018 revealed hemangioma, no suspicious liver masses\par --Patient declined Alchemist trial participation, consent provided 4/20/2018 \par --She continues on baby ASA as well as amiodarone for A. fib\par --Follow up CT chest with contrast on 9/22/19 with stable  Postoperative changes, stable finding.  \par --MRI esthela 11/27/2018  revealed No MRI evidence of intracranial metastatic disease. Stable exam since 12/18/2017\par --Follow up CT chest on 4/29/2019 on 9/22/2019, 4/30/2020 appear stable\par --Patient is due for repeat CT scan in 10/2020, ordered on 7/21/2020\par \par Thrombosis in the L atrial appendage is an expected finding post valve surgery, this was discussed with CT surgery, who reviewed op report, confirming that LA appendage was in fact closed intraop and thrombosis is expected\par --Echo essentially normal, she will review results with cardiology \par --Importance of follow up with cardiology reiterated \par \par Patient will remain on observation . \par Follow up after CT chest in Oct, 2020 \par \par She will follow up with Dr Lopez in Nov, 2020. \par Patient seen and examined by Dr Lopez who agreed for the above plan of care.

## 2020-07-22 DIAGNOSIS — C34.90 MALIGNANT NEOPLASM OF UNSPECIFIED PART OF UNSPECIFIED BRONCHUS OR LUNG: ICD-10-CM

## 2020-07-22 LAB
ALBUMIN SERPL ELPH-MCNC: 4.4 G/DL
ALP BLD-CCNC: 78 U/L
ALT SERPL-CCNC: 12 U/L
ANION GAP SERPL CALC-SCNC: 13 MMOL/L
AST SERPL-CCNC: 18 U/L
BILIRUB SERPL-MCNC: 0.2 MG/DL
BUN SERPL-MCNC: 17 MG/DL
CALCIUM SERPL-MCNC: 9.8 MG/DL
CHLORIDE SERPL-SCNC: 100 MMOL/L
CO2 SERPL-SCNC: 27 MMOL/L
CREAT SERPL-MCNC: 1 MG/DL
GLUCOSE SERPL-MCNC: 98 MG/DL
HCT VFR BLD CALC: 40.5 %
HGB BLD-MCNC: 13.5 G/DL
MCHC RBC-ENTMCNC: 31.5 PG
MCHC RBC-ENTMCNC: 33.3 G/DL
MCV RBC AUTO: 94.6 FL
PLATELET # BLD AUTO: 244 K/UL
PMV BLD: 8.9 FL
POTASSIUM SERPL-SCNC: 4.6 MMOL/L
PROT SERPL-MCNC: 7.2 G/DL
RBC # BLD: 4.28 M/UL
RBC # FLD: 13.3 %
SODIUM SERPL-SCNC: 140 MMOL/L
WBC # FLD AUTO: 6.41 K/UL

## 2020-11-17 ENCOUNTER — APPOINTMENT (OUTPATIENT)
Dept: HEMATOLOGY ONCOLOGY | Facility: CLINIC | Age: 75
End: 2020-11-17

## 2020-12-02 ENCOUNTER — RESULT REVIEW (OUTPATIENT)
Age: 75
End: 2020-12-02

## 2020-12-02 ENCOUNTER — OUTPATIENT (OUTPATIENT)
Dept: OUTPATIENT SERVICES | Facility: HOSPITAL | Age: 75
LOS: 1 days | Discharge: HOME | End: 2020-12-02
Payer: MEDICARE

## 2020-12-02 DIAGNOSIS — C34.90 MALIGNANT NEOPLASM OF UNSPECIFIED PART OF UNSPECIFIED BRONCHUS OR LUNG: ICD-10-CM

## 2020-12-02 PROCEDURE — 71250 CT THORAX DX C-: CPT | Mod: 26

## 2021-11-29 ENCOUNTER — APPOINTMENT (OUTPATIENT)
Dept: HEMATOLOGY ONCOLOGY | Facility: CLINIC | Age: 76
End: 2021-11-29

## 2022-03-23 ENCOUNTER — APPOINTMENT (OUTPATIENT)
Dept: HEMATOLOGY ONCOLOGY | Facility: CLINIC | Age: 77
End: 2022-03-23
Payer: MEDICARE

## 2022-03-23 ENCOUNTER — OUTPATIENT (OUTPATIENT)
Dept: OUTPATIENT SERVICES | Facility: HOSPITAL | Age: 77
LOS: 1 days | Discharge: HOME | End: 2022-03-23

## 2022-03-23 ENCOUNTER — LABORATORY RESULT (OUTPATIENT)
Age: 77
End: 2022-03-23

## 2022-03-23 VITALS
SYSTOLIC BLOOD PRESSURE: 127 MMHG | HEART RATE: 90 BPM | WEIGHT: 122 LBS | HEIGHT: 64 IN | TEMPERATURE: 97.2 F | BODY MASS INDEX: 20.83 KG/M2 | DIASTOLIC BLOOD PRESSURE: 57 MMHG

## 2022-03-23 LAB
HCT VFR BLD CALC: 38.9 %
HGB BLD-MCNC: 13.2 G/DL
MCHC RBC-ENTMCNC: 31.5 PG
MCHC RBC-ENTMCNC: 33.9 G/DL
MCV RBC AUTO: 92.8 FL
PLATELET # BLD AUTO: 254 K/UL
PMV BLD: 8.8 FL
RBC # BLD: 4.19 M/UL
RBC # FLD: 13.2 %
WBC # FLD AUTO: 7.33 K/UL

## 2022-03-23 PROCEDURE — 99214 OFFICE O/P EST MOD 30 MIN: CPT

## 2022-03-23 NOTE — REVIEW OF SYSTEMS
[SOB on Exertion] : shortness of breath during exertion [Negative] : Allergic/Immunologic [Fever] : no fever [Chills] : no chills [Night Sweats] : no night sweats [Fatigue] : no fatigue [Recent Change In Weight] : ~T no recent weight change [Shortness Of Breath] : no shortness of breath [Wheezing] : no wheezing [Cough] : no cough

## 2022-03-23 NOTE — HISTORY OF PRESENT ILLNESS
[de-identified] : Marie is a luc, fully functional 71 yo lady referred for evaluation of NSCCL by Dr. Smith. \par Marie initially presented for evaluation of progressive SOB and was noted to have severe mitral valve regurgitation. During her evaluation at the time CT for evaluation of coronary artery arthrosclerosis on 8/10/2017 revealed 1. Left upper lobe spiculated 2.8 cm pulmonary nodules suspicious for neoplasm.    2. Right hepatic lobe 2.2 cm hypoattenuating lesion, not fully characterized.  Outpatient, nonemergent evaluation with MRI abdomen with and without IV contrast  recommended.    3. Additional bilateral pulmonary nodules measuring up to 0.4 cm.    4. Centrilobular emphysema.    5. Aortic calcifications. \par Follow up PET CT on 8/28/2017 revealed  abnormal FDG uptake within a spiculated 2.8 cm left upper lobe nodule, max SUV  5.5 consistent with biologic active FDG avid neoplasm.    No other sites of abnormal FDG uptake.    Non-FDG avid right hepatic lobe 2.2 cm hypoattenuating lesion, not fully  characterized. Further evaluation with MRI abdomen with and without IV contrast recommended.\par She then underwent successful mitral valve repair by Dr. Youssef on 9/14/2017 and on 12/8/2017 left apical segmentectomy and mediastinal node dissection by Dr. Smith. \par Pathology revealed a LUIS ARMANDO adenocarcinoma, acinar predominant, micropapillary predominant, poorly differentiated, 3.6cm is greatest dimension, without pleural invasion, LVI, with negative margins = pT2a, 4 nodes (level 5, level 12) out of 15 examined were positive for adenocarcinoma =pN2.\par Therefore this is at least IIIa ([id number]) disease with N2 involvement. \par She is also managed for A. fib with amiodarone and full ASA, Coumadin has been discontinued.  [de-identified] : 12/8/2017: Daksha has recovered well from surgery. She only feels "winded" with climbing steps, she went back to work in [place]. She is not requiring oxygen at home. She reports no pain. With her 2 surgeries she has lost minimal amount of weight. She feels well. Reports no new bony pains, no neurological symptoms, no GI or  symptoms. \par We have at length discussed the risks and benefits of adjuvant chemotherapy in the setting of stage IIIa disease with N2 mansoor involvement. \par Marie is reluctant to receive cisplatin based therapy given the risks of permanent renal failure and ototoxicity we have discussed. \par We have discussed the carboplatin backbone with Alimta regiment, followed by consideration of adjuvant radiation therapy to the mediastinum. We have also briefly discussed potential eligibility for the Alchemist trial. \par However given Marie's last PET CT was 4 months ago, I will offer restaging scans including CT C/A/P and MRI or the brain prior to initiation of adjuvant therapy. \par \par 1/16/2018: Marie had CT C/A/P and MRI of the brain done, both did not reveal recurrent disease. There are 2 small pulmonary nodules, which will warrant continued follow up. \par These findings were discussed. She is taking folic acid and has already received vitamin B12 injection. She wishes to try to continue to work during chemotherapy.\par \par 2/9/2018: Marie is s/p 1st dose of C on Carbo/Alimta on 1/18/2018, she did not come in for Neulasta, her counts are adequate today. Will hold Neulasta for this cycle. Marie is taking folic acid every day and continues to work. She was educated regarding PO Decadron post chemotherapy. \par \par 3/2/2018: Marie is doing really well today. She continues to work, she had few episodes of nausea, no vomiting, no mouth sores, no neuropathy. For cycle 3 today, will administer on body Neulasta and B12 today. \par \par 3/23/2018: No complaints, she is tolerating chemotherapy very well. Will consider Alchemist trial participation. \par \par 4/20/2018: Marie has completed 4 cycles of adjuvant Carbo/Alimta on 3/23/2018. She has tolerated chemotherapy without any difficulty. She did not hear from Rad Onc yet and will be referred today, case was discussed with Dr. Juarez. She also may be interested in Alchemist trial participation, consent was provided for her review. \par \par 5/29/2018: Marie is doing great, she was awaiting restaging scans prior to starting on XRT. These were just completed, results were reviewed today. MRI of the liver on 5/26/2018 revealed 2.1 cm right hepatic lobe hemangioma. No suspicious liver lesions. PET CT on 5/25/2018 revealed COMPARISON : PET CT 8/28/2017  Status post left apical segmentectomy and mediastinal lymph node  dissection (11/16/2018). No abnormal FDG uptake to suggest  residual/recurrent biologic tumor activity. She was referred to f/u with radiation ASAP. We have also briefly discussed the Alchemist trial participation. \par \par 8/22/2018: Marie has completed 25 treatments of radiation to the chest on 7/27/2018, she has tolerated radiation well, reports some radiation induced esophagitis, that has now resoled. She continues to work and overall feels well. I have briefly discussed with her Alchemist trial participation, she reports she is very emotionally exhausted from dealing with cancer and for this reason may decline. She understands her odds of recurrence in the setting of N2 disease are quite high. \par \par 4/9/2019: Marie feels well and denies any complaints today, she has gained some weight and continues to work. Denies any new pain SOB. \par \par 5/7/19 : Marie came for a follow up visit, she reports feeling well and offers no complaints at all. CT chest from 4/23/19 with stable result was reviewed with patient, images personally reviewed by John. Marie denied SOB\par CT chest revealed on 4/23/2019\par 1. Stable findings of bronchiectasis and scarring in left upper lung, \par related to previous surgery. \par 2. Decreased loculated left pleural effusion. \par 3. No new lung findings\par Marie also had an Echo which was mostly normal, she was advised to further discuss it with her cardiologist. \par \par 9/10/2019: Marie continues to feel well, she continues to work and denies any complaints.\par  \par 11/19/19 : Patient came for a follow up visit, she reports feeling well. she works full time. She denies any complaints. \par We communicated results from CT chest done on 9/22/19, that revealed \par 1. No evidence for intrathoracic metastatic or recurrent malignant disease. \par 2. Stable postoperative changes, as above. \par She will remain on observation , we communicated CBC from 11/19/19 with normal Hemogram. \par Images were personally reviewed by MD John and discussed with patient. \par She is due for 6 month follow up CT of her chest in 3/2020, this was ordered today.\par \par 7/21/2020 Marie is a 74 years old female came for  a follow up visit for Stage IIIa, N2 positive adenocarcinoma of the lung. She reports feeling well. She didn't attend to work since 3/21/20, 2/2 pandemic. \par CT chest on 4/30/20 revealed \par Posttreatment changes within the left lung without evidence for recurrence. \par No new suspicious mass or nodule. \par Images were personally reviewed by MD John and discussed with patient. \par Patient is due for another CT chest  in 11/2020, script was provided on 7/21/2020. \par \par 3/23/22\par Patient is here for a follow-up visit for NSCLC.  She is transitioning care from another provider previously known to the clinic.  She is feeling well with no new complaints.  No recent labwork or imaging available for review.  Patient denies fever, chills, nausea, vomiting, new cough, unintentional weight loss, dyspnea or bleeding.\par \par 3/23/22\par she is here for follow up. She feels well and has no new complaints . On occasion she has dyspnea on exertion which is chronic.

## 2022-03-23 NOTE — ASSESSMENT
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 fib\par \par Plan\par -will order CT chest for surveillance, last CT was   12/2020\par -CBC CMP today\par -if rosemarie will see me in one year\par \par # Thrombosis in the L atrial appendage is an expected finding post valve surgery, this was discussed with CT surgery, who reviewed op report, confirming that LA appendage was in fact closed intraop and thrombosis is expected\par - Echo essentially normal, follwos with cards\par \par HCM\par No longer smokes\par She is not intrusted in any mammograms or colposcopy at this point.  she is aware of benefit\par

## 2022-03-23 NOTE — PHYSICAL EXAM
[Restricted in physically strenuous activity but ambulatory and able to carry out work of a light or sedentary nature] : Status 1- Restricted in physically strenuous activity but ambulatory and able to carry out work of a light or sedentary nature, e.g., light house work, office work [Thin] : thin [Normal] : affect appropriate [de-identified] : Sternal scar

## 2022-03-24 DIAGNOSIS — C34.90 MALIGNANT NEOPLASM OF UNSPECIFIED PART OF UNSPECIFIED BRONCHUS OR LUNG: ICD-10-CM

## 2022-03-24 LAB
ALBUMIN SERPL ELPH-MCNC: 4.4 G/DL
ALP BLD-CCNC: 83 U/L
ALT SERPL-CCNC: 10 U/L
ANION GAP SERPL CALC-SCNC: 13 MMOL/L
AST SERPL-CCNC: 16 U/L
BILIRUB SERPL-MCNC: 0.3 MG/DL
BUN SERPL-MCNC: 12 MG/DL
CALCIUM SERPL-MCNC: 9.6 MG/DL
CHLORIDE SERPL-SCNC: 108 MMOL/L
CO2 SERPL-SCNC: 25 MMOL/L
CREAT SERPL-MCNC: 0.9 MG/DL
EGFR: 66 ML/MIN/1.73M2
GLUCOSE SERPL-MCNC: 86 MG/DL
POTASSIUM SERPL-SCNC: 4.3 MMOL/L
PROT SERPL-MCNC: 6.5 G/DL
SODIUM SERPL-SCNC: 146 MMOL/L

## 2022-04-07 ENCOUNTER — RESULT REVIEW (OUTPATIENT)
Age: 77
End: 2022-04-07

## 2022-04-07 ENCOUNTER — OUTPATIENT (OUTPATIENT)
Dept: OUTPATIENT SERVICES | Facility: HOSPITAL | Age: 77
LOS: 1 days | Discharge: HOME | End: 2022-04-07
Payer: MEDICARE

## 2022-04-07 DIAGNOSIS — C34.90 MALIGNANT NEOPLASM OF UNSPECIFIED PART OF UNSPECIFIED BRONCHUS OR LUNG: ICD-10-CM

## 2022-04-07 PROCEDURE — 71250 CT THORAX DX C-: CPT | Mod: 26

## 2022-10-07 ENCOUNTER — OUTPATIENT (OUTPATIENT)
Dept: OUTPATIENT SERVICES | Facility: HOSPITAL | Age: 77
LOS: 1 days | End: 2022-10-07

## 2022-10-07 ENCOUNTER — APPOINTMENT (OUTPATIENT)
Dept: HEMATOLOGY ONCOLOGY | Facility: CLINIC | Age: 77
End: 2022-10-07

## 2022-10-07 VITALS
HEART RATE: 122 BPM | HEIGHT: 64 IN | WEIGHT: 128 LBS | TEMPERATURE: 96.8 F | SYSTOLIC BLOOD PRESSURE: 107 MMHG | OXYGEN SATURATION: 98 % | BODY MASS INDEX: 21.85 KG/M2 | RESPIRATION RATE: 14 BRPM | DIASTOLIC BLOOD PRESSURE: 58 MMHG

## 2022-10-07 LAB
BASOPHILS # BLD AUTO: 0.06 K/UL
BASOPHILS NFR BLD AUTO: 0.8 %
EOSINOPHIL # BLD AUTO: 0.15 K/UL
EOSINOPHIL NFR BLD AUTO: 2.1 %
HCT VFR BLD CALC: 37.7 %
HGB BLD-MCNC: 12.7 G/DL
IMM GRANULOCYTES NFR BLD AUTO: 0.6 %
LYMPHOCYTES # BLD AUTO: 1.72 K/UL
LYMPHOCYTES NFR BLD AUTO: 24.4 %
MAN DIFF?: NORMAL
MCHC RBC-ENTMCNC: 31.2 PG
MCHC RBC-ENTMCNC: 33.7 G/DL
MCV RBC AUTO: 92.6 FL
MONOCYTES # BLD AUTO: 0.56 K/UL
MONOCYTES NFR BLD AUTO: 7.9 %
NEUTROPHILS # BLD AUTO: 4.53 K/UL
NEUTROPHILS NFR BLD AUTO: 64.2 %
PLATELET # BLD AUTO: 222 K/UL
RBC # BLD: 4.07 M/UL
RBC # FLD: 13.9 %
WBC # FLD AUTO: 7.06 K/UL

## 2022-10-07 PROCEDURE — 99213 OFFICE O/P EST LOW 20 MIN: CPT

## 2022-10-07 NOTE — PHYSICAL EXAM
[Restricted in physically strenuous activity but ambulatory and able to carry out work of a light or sedentary nature] : Status 1- Restricted in physically strenuous activity but ambulatory and able to carry out work of a light or sedentary nature, e.g., light house work, office work [Thin] : thin [Normal] : affect appropriate [de-identified] : Sternal scar

## 2022-10-07 NOTE — ASSESSMENT
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 Interval growth in groundglass nodules posterior right lower lobe currently measuring approximately 2.4 x 1.0 cm, previously 1.3 x 1.0 cm. Follow-up CT scan 6 months. Post left upper lobe wedge resection with stable posttreatment changes left lung.\par \par # Thrombosis in the L atrial appendage is an expected finding post valve surgery, this was discussed with CT surgery, who reviewed op report, confirming that LA appendage was in fact closed intraop and thrombosis is expected.\par - Echo essentially normal, follows with cardiology.\par \par HCM\par No longer smokes.\par She is not intrusted in any mammograms or colposcopy at this point. She is aware of benefit.\par \par PLAN:\par \par - repeat CT chest  now ginve the ground glass nodules  -10/2022.\par - collect CBC CMP today.\par \par RTC depending on CT results

## 2022-10-07 NOTE — REVIEW OF SYSTEMS
[SOB on Exertion] : shortness of breath during exertion [Negative] : Allergic/Immunologic [Loss of Hearing] : loss of hearing [Incontinence] : incontinence [Fever] : no fever [Chills] : no chills [Night Sweats] : no night sweats [Fatigue] : no fatigue [Recent Change In Weight] : ~T no recent weight change [Shortness Of Breath] : no shortness of breath [Wheezing] : no wheezing [Cough] : no cough

## 2022-10-07 NOTE — END OF VISIT
[FreeTextEntry3] : She returns for follow-up today.  Overall she is doing well. Her CT scan in April showed groundglass nodules repeat was recommended in 6 months and will obtain now.  Depending on results we will arrange follow-up

## 2022-10-07 NOTE — HISTORY OF PRESENT ILLNESS
[de-identified] : Marie is a luc, fully functional 73 yo lady referred for evaluation of NSCCL by Dr. Smith. \par Marie initially presented for evaluation of progressive SOB and was noted to have severe mitral valve regurgitation. During her evaluation at the time CT for evaluation of coronary artery arthrosclerosis on 8/10/2017 revealed 1. Left upper lobe spiculated 2.8 cm pulmonary nodules suspicious for neoplasm.    2. Right hepatic lobe 2.2 cm hypoattenuating lesion, not fully characterized.  Outpatient, nonemergent evaluation with MRI abdomen with and without IV contrast  recommended.    3. Additional bilateral pulmonary nodules measuring up to 0.4 cm.    4. Centrilobular emphysema.    5. Aortic calcifications. \par Follow up PET CT on 8/28/2017 revealed  abnormal FDG uptake within a spiculated 2.8 cm left upper lobe nodule, max SUV  5.5 consistent with biologic active FDG avid neoplasm.    No other sites of abnormal FDG uptake.    Non-FDG avid right hepatic lobe 2.2 cm hypoattenuating lesion, not fully  characterized. Further evaluation with MRI abdomen with and without IV contrast recommended.\par She then underwent successful mitral valve repair by Dr. Youssef on 9/14/2017 and on 12/8/2017 left apical segmentectomy and mediastinal node dissection by Dr. Smith. \par Pathology revealed a LUIS ARMANDO adenocarcinoma, acinar predominant, micropapillary predominant, poorly differentiated, 3.6cm is greatest dimension, without pleural invasion, LVI, with negative margins = pT2a, 4 nodes (level 5, level 12) out of 15 examined were positive for adenocarcinoma =pN2.\par Therefore this is at least IIIa ([id number]) disease with N2 involvement. \par She is also managed for A. fib with amiodarone and full ASA, Coumadin has been discontinued.  [de-identified] : 12/8/2017: Daksha has recovered well from surgery. She only feels "winded" with climbing steps, she went back to work in [place]. She is not requiring oxygen at home. She reports no pain. With her 2 surgeries she has lost minimal amount of weight. She feels well. Reports no new bony pains, no neurological symptoms, no GI or  symptoms. \par We have at length discussed the risks and benefits of adjuvant chemotherapy in the setting of stage IIIa disease with N2 mansoor involvement. \par Marie is reluctant to receive cisplatin based therapy given the risks of permanent renal failure and ototoxicity we have discussed. \par We have discussed the carboplatin backbone with Alimta regiment, followed by consideration of adjuvant radiation therapy to the mediastinum. We have also briefly discussed potential eligibility for the Alchemist trial. \par However given Marie's last PET CT was 4 months ago, I will offer restaging scans including CT C/A/P and MRI or the brain prior to initiation of adjuvant therapy. \par \par 1/16/2018: Marie had CT C/A/P and MRI of the brain done, both did not reveal recurrent disease. There are 2 small pulmonary nodules, which will warrant continued follow up. \par These findings were discussed. She is taking folic acid and has already received vitamin B12 injection. She wishes to try to continue to work during chemotherapy.\par \par 2/9/2018: Marie is s/p 1st dose of C on Carbo/Alimta on 1/18/2018, she did not come in for Neulasta, her counts are adequate today. Will hold Neulasta for this cycle. Marie is taking folic acid every day and continues to work. She was educated regarding PO Decadron post chemotherapy. \par \par 3/2/2018: Marie is doing really well today. She continues to work, she had few episodes of nausea, no vomiting, no mouth sores, no neuropathy. For cycle 3 today, will administer on body Neulasta and B12 today. \par \par 3/23/2018: No complaints, she is tolerating chemotherapy very well. Will consider Alchemist trial participation. \par \par 4/20/2018: Marie has completed 4 cycles of adjuvant Carbo/Alimta on 3/23/2018. She has tolerated chemotherapy without any difficulty. She did not hear from Rad Onc yet and will be referred today, case was discussed with Dr. Juarez. She also may be interested in Alchemist trial participation, consent was provided for her review. \par \par 5/29/2018: Marie is doing great, she was awaiting restaging scans prior to starting on XRT. These were just completed, results were reviewed today. MRI of the liver on 5/26/2018 revealed 2.1 cm right hepatic lobe hemangioma. No suspicious liver lesions. PET CT on 5/25/2018 revealed COMPARISON : PET CT 8/28/2017  Status post left apical segmentectomy and mediastinal lymph node  dissection (11/16/2018). No abnormal FDG uptake to suggest  residual/recurrent biologic tumor activity. She was referred to f/u with radiation ASAP. We have also briefly discussed the Alchemist trial participation. \par \par 8/22/2018: Marie has completed 25 treatments of radiation to the chest on 7/27/2018, she has tolerated radiation well, reports some radiation induced esophagitis, that has now resoled. She continues to work and overall feels well. I have briefly discussed with her Alchemist trial participation, she reports she is very emotionally exhausted from dealing with cancer and for this reason may decline. She understands her odds of recurrence in the setting of N2 disease are quite high. \par \par 4/9/2019: Marie feels well and denies any complaints today, she has gained some weight and continues to work. Denies any new pain SOB. \par \par 5/7/19 : Marie came for a follow up visit, she reports feeling well and offers no complaints at all. CT chest from 4/23/19 with stable result was reviewed with patient, images personally reviewed by John. Marie denied SOB\par CT chest revealed on 4/23/2019\par 1. Stable findings of bronchiectasis and scarring in left upper lung, related to previous surgery. \par 2. Decreased loculated left pleural effusion. \par 3. No new lung findings\par Marie also had an Echo which was mostly normal, she was advised to further discuss it with her cardiologist. \par \par 9/10/2019: Marie continues to feel well, she continues to work and denies any complaints.\par  \par 11/19/19 : Patient came for a follow up visit, she reports feeling well. she works full time. She denies any complaints. \par We communicated results from CT chest done on 9/22/19, that revealed \par 1. No evidence for intrathoracic metastatic or recurrent malignant disease. \par 2. Stable postoperative changes, as above. \par She will remain on observation , we communicated CBC from 11/19/19 with normal Hemogram. \par Images were personally reviewed by MD John and discussed with patient. \par She is due for 6 month follow up CT of her chest in 3/2020, this was ordered today.\par \par 7/21/2020 Marie is a 74 years old female came for  a follow up visit for Stage IIIa, N2 positive adenocarcinoma of the lung. She reports feeling well. She didn't attend to work since 3/21/20, 2/2 pandemic. \par CT chest on 4/30/20 revealed \par Posttreatment changes within the left lung without evidence for recurrence. \par No new suspicious mass or nodule. \par Images were personally reviewed by MD John and discussed with patient. \par Patient is due for another CT chest  in 11/2020, script was provided on 7/21/2020. \par \par 3/23/22\par Patient is here for a follow-up visit for NSCLC.  She is transitioning care from another provider previously known to the clinic.  She is feeling well with no new complaints.  No recent labwork or imaging available for review.  Patient denies fever, chills, nausea, vomiting, new cough, unintentional weight loss, dyspnea or bleeding.\par \par 3/23/22\par she is here for follow up. She feels well and has no new complaints . On occasion she has dyspnea on exertion which is chronic. \par \par 10/07/2022\par Reports feeling well, no changes in chronic conditions or new complaints since the last visit.\par

## 2022-10-11 DIAGNOSIS — C34.90 MALIGNANT NEOPLASM OF UNSPECIFIED PART OF UNSPECIFIED BRONCHUS OR LUNG: ICD-10-CM

## 2022-10-11 LAB
ALBUMIN SERPL ELPH-MCNC: 4.3 G/DL
ALP BLD-CCNC: 87 U/L
ALT SERPL-CCNC: 19 U/L
ANION GAP SERPL CALC-SCNC: 13 MMOL/L
AST SERPL-CCNC: 30 U/L
BILIRUB SERPL-MCNC: 0.4 MG/DL
BUN SERPL-MCNC: 14 MG/DL
CALCIUM SERPL-MCNC: 9.3 MG/DL
CHLORIDE SERPL-SCNC: 102 MMOL/L
CO2 SERPL-SCNC: 25 MMOL/L
CREAT SERPL-MCNC: 0.8 MG/DL
EGFR: 76 ML/MIN/1.73M2
GLUCOSE SERPL-MCNC: 98 MG/DL
POTASSIUM SERPL-SCNC: 4.3 MMOL/L
PROT SERPL-MCNC: 6.7 G/DL
SODIUM SERPL-SCNC: 140 MMOL/L

## 2022-10-26 ENCOUNTER — RESULT REVIEW (OUTPATIENT)
Age: 77
End: 2022-10-26

## 2022-10-26 ENCOUNTER — OUTPATIENT (OUTPATIENT)
Dept: OUTPATIENT SERVICES | Facility: HOSPITAL | Age: 77
LOS: 1 days | Discharge: HOME | End: 2022-10-26

## 2022-10-26 DIAGNOSIS — C34.90 MALIGNANT NEOPLASM OF UNSPECIFIED PART OF UNSPECIFIED BRONCHUS OR LUNG: ICD-10-CM

## 2022-10-26 PROCEDURE — 71250 CT THORAX DX C-: CPT | Mod: 26

## 2023-03-03 ENCOUNTER — RESULT REVIEW (OUTPATIENT)
Age: 78
End: 2023-03-03

## 2023-03-03 ENCOUNTER — OUTPATIENT (OUTPATIENT)
Dept: OUTPATIENT SERVICES | Facility: HOSPITAL | Age: 78
LOS: 1 days | End: 2023-03-03
Payer: MEDICARE

## 2023-03-03 DIAGNOSIS — Z00.8 ENCOUNTER FOR OTHER GENERAL EXAMINATION: ICD-10-CM

## 2023-03-03 DIAGNOSIS — R06.2 WHEEZING: ICD-10-CM

## 2023-03-03 PROCEDURE — 71250 CT THORAX DX C-: CPT

## 2023-03-03 PROCEDURE — 71250 CT THORAX DX C-: CPT | Mod: 26

## 2023-03-04 DIAGNOSIS — R06.2 WHEEZING: ICD-10-CM

## 2023-05-07 ENCOUNTER — NON-APPOINTMENT (OUTPATIENT)
Age: 78
End: 2023-05-07

## 2023-05-12 ENCOUNTER — INPATIENT (INPATIENT)
Facility: HOSPITAL | Age: 78
LOS: 10 days | Discharge: HOME HEALTH | DRG: 308 | End: 2023-05-23
Attending: INTERNAL MEDICINE | Admitting: INTERNAL MEDICINE
Payer: MEDICARE

## 2023-05-12 VITALS
RESPIRATION RATE: 16 BRPM | SYSTOLIC BLOOD PRESSURE: 90 MMHG | OXYGEN SATURATION: 99 % | TEMPERATURE: 98 F | DIASTOLIC BLOOD PRESSURE: 70 MMHG | WEIGHT: 125 LBS

## 2023-05-12 DIAGNOSIS — J43.9 EMPHYSEMA, UNSPECIFIED: ICD-10-CM

## 2023-05-12 DIAGNOSIS — R31.9 HEMATURIA, UNSPECIFIED: ICD-10-CM

## 2023-05-12 DIAGNOSIS — J98.11 ATELECTASIS: ICD-10-CM

## 2023-05-12 DIAGNOSIS — B34.1 ENTEROVIRUS INFECTION, UNSPECIFIED: ICD-10-CM

## 2023-05-12 DIAGNOSIS — Z78.1 PHYSICAL RESTRAINT STATUS: ICD-10-CM

## 2023-05-12 DIAGNOSIS — I27.20 PULMONARY HYPERTENSION, UNSPECIFIED: ICD-10-CM

## 2023-05-12 DIAGNOSIS — E83.42 HYPOMAGNESEMIA: ICD-10-CM

## 2023-05-12 DIAGNOSIS — K92.1 MELENA: ICD-10-CM

## 2023-05-12 DIAGNOSIS — Z92.3 PERSONAL HISTORY OF IRRADIATION: ICD-10-CM

## 2023-05-12 DIAGNOSIS — Z87.891 PERSONAL HISTORY OF NICOTINE DEPENDENCE: ICD-10-CM

## 2023-05-12 DIAGNOSIS — R57.0 CARDIOGENIC SHOCK: ICD-10-CM

## 2023-05-12 DIAGNOSIS — N93.9 ABNORMAL UTERINE AND VAGINAL BLEEDING, UNSPECIFIED: ICD-10-CM

## 2023-05-12 DIAGNOSIS — I50.21 ACUTE SYSTOLIC (CONGESTIVE) HEART FAILURE: ICD-10-CM

## 2023-05-12 DIAGNOSIS — I44.7 LEFT BUNDLE-BRANCH BLOCK, UNSPECIFIED: ICD-10-CM

## 2023-05-12 DIAGNOSIS — F41.9 ANXIETY DISORDER, UNSPECIFIED: ICD-10-CM

## 2023-05-12 DIAGNOSIS — I48.0 PAROXYSMAL ATRIAL FIBRILLATION: ICD-10-CM

## 2023-05-12 DIAGNOSIS — I42.9 CARDIOMYOPATHY, UNSPECIFIED: ICD-10-CM

## 2023-05-12 DIAGNOSIS — Z92.21 PERSONAL HISTORY OF ANTINEOPLASTIC CHEMOTHERAPY: ICD-10-CM

## 2023-05-12 DIAGNOSIS — J96.01 ACUTE RESPIRATORY FAILURE WITH HYPOXIA: ICD-10-CM

## 2023-05-12 DIAGNOSIS — Z85.118 PERSONAL HISTORY OF OTHER MALIGNANT NEOPLASM OF BRONCHUS AND LUNG: ICD-10-CM

## 2023-05-12 DIAGNOSIS — I08.1 RHEUMATIC DISORDERS OF BOTH MITRAL AND TRICUSPID VALVES: ICD-10-CM

## 2023-05-12 DIAGNOSIS — Z90.2 ACQUIRED ABSENCE OF LUNG [PART OF]: ICD-10-CM

## 2023-05-12 DIAGNOSIS — A41.9 SEPSIS, UNSPECIFIED ORGANISM: ICD-10-CM

## 2023-05-12 DIAGNOSIS — I11.0 HYPERTENSIVE HEART DISEASE WITH HEART FAILURE: ICD-10-CM

## 2023-05-12 DIAGNOSIS — I48.91 UNSPECIFIED ATRIAL FIBRILLATION: ICD-10-CM

## 2023-05-12 DIAGNOSIS — E11.9 TYPE 2 DIABETES MELLITUS WITHOUT COMPLICATIONS: ICD-10-CM

## 2023-05-12 DIAGNOSIS — E66.9 OBESITY, UNSPECIFIED: ICD-10-CM

## 2023-05-12 DIAGNOSIS — E43 UNSPECIFIED SEVERE PROTEIN-CALORIE MALNUTRITION: ICD-10-CM

## 2023-05-12 DIAGNOSIS — J69.0 PNEUMONITIS DUE TO INHALATION OF FOOD AND VOMIT: ICD-10-CM

## 2023-05-12 DIAGNOSIS — I44.1 ATRIOVENTRICULAR BLOCK, SECOND DEGREE: ICD-10-CM

## 2023-05-12 LAB
ALBUMIN SERPL ELPH-MCNC: 4 G/DL — SIGNIFICANT CHANGE UP (ref 3.5–5.2)
ALP SERPL-CCNC: 137 U/L — HIGH (ref 30–115)
ALT FLD-CCNC: 60 U/L — HIGH (ref 0–41)
ANION GAP SERPL CALC-SCNC: 14 MMOL/L — SIGNIFICANT CHANGE UP (ref 7–14)
AST SERPL-CCNC: 54 U/L — HIGH (ref 0–41)
BASOPHILS # BLD AUTO: 0.05 K/UL — SIGNIFICANT CHANGE UP (ref 0–0.2)
BASOPHILS NFR BLD AUTO: 0.6 % — SIGNIFICANT CHANGE UP (ref 0–1)
BILIRUB SERPL-MCNC: 0.5 MG/DL — SIGNIFICANT CHANGE UP (ref 0.2–1.2)
BUN SERPL-MCNC: 12 MG/DL — SIGNIFICANT CHANGE UP (ref 10–20)
CALCIUM SERPL-MCNC: 9.6 MG/DL — SIGNIFICANT CHANGE UP (ref 8.4–10.5)
CHLORIDE SERPL-SCNC: 101 MMOL/L — SIGNIFICANT CHANGE UP (ref 98–110)
CO2 SERPL-SCNC: 22 MMOL/L — SIGNIFICANT CHANGE UP (ref 17–32)
CREAT SERPL-MCNC: 0.9 MG/DL — SIGNIFICANT CHANGE UP (ref 0.7–1.5)
EGFR: 65 ML/MIN/1.73M2 — SIGNIFICANT CHANGE UP
EOSINOPHIL # BLD AUTO: 0.1 K/UL — SIGNIFICANT CHANGE UP (ref 0–0.7)
EOSINOPHIL NFR BLD AUTO: 1.1 % — SIGNIFICANT CHANGE UP (ref 0–8)
GLUCOSE SERPL-MCNC: 99 MG/DL — SIGNIFICANT CHANGE UP (ref 70–99)
HCT VFR BLD CALC: 38.6 % — SIGNIFICANT CHANGE UP (ref 37–47)
HGB BLD-MCNC: 12.8 G/DL — SIGNIFICANT CHANGE UP (ref 12–16)
IMM GRANULOCYTES NFR BLD AUTO: 0.6 % — HIGH (ref 0.1–0.3)
LIDOCAIN IGE QN: 27 U/L — SIGNIFICANT CHANGE UP (ref 7–60)
LYMPHOCYTES # BLD AUTO: 1.54 K/UL — SIGNIFICANT CHANGE UP (ref 1.2–3.4)
LYMPHOCYTES # BLD AUTO: 17.7 % — LOW (ref 20.5–51.1)
MCHC RBC-ENTMCNC: 31.1 PG — HIGH (ref 27–31)
MCHC RBC-ENTMCNC: 33.2 G/DL — SIGNIFICANT CHANGE UP (ref 32–37)
MCV RBC AUTO: 93.9 FL — SIGNIFICANT CHANGE UP (ref 81–99)
MONOCYTES # BLD AUTO: 0.66 K/UL — HIGH (ref 0.1–0.6)
MONOCYTES NFR BLD AUTO: 7.6 % — SIGNIFICANT CHANGE UP (ref 1.7–9.3)
NEUTROPHILS # BLD AUTO: 6.32 K/UL — SIGNIFICANT CHANGE UP (ref 1.4–6.5)
NEUTROPHILS NFR BLD AUTO: 72.4 % — SIGNIFICANT CHANGE UP (ref 42.2–75.2)
NRBC # BLD: 0 /100 WBCS — SIGNIFICANT CHANGE UP (ref 0–0)
NT-PROBNP SERPL-SCNC: 6227 PG/ML — HIGH (ref 0–300)
PLATELET # BLD AUTO: 328 K/UL — SIGNIFICANT CHANGE UP (ref 130–400)
PMV BLD: 9.4 FL — SIGNIFICANT CHANGE UP (ref 7.4–10.4)
POTASSIUM SERPL-MCNC: 4.3 MMOL/L — SIGNIFICANT CHANGE UP (ref 3.5–5)
POTASSIUM SERPL-SCNC: 4.3 MMOL/L — SIGNIFICANT CHANGE UP (ref 3.5–5)
PROT SERPL-MCNC: 6.8 G/DL — SIGNIFICANT CHANGE UP (ref 6–8)
RBC # BLD: 4.11 M/UL — LOW (ref 4.2–5.4)
RBC # FLD: 14.1 % — SIGNIFICANT CHANGE UP (ref 11.5–14.5)
SODIUM SERPL-SCNC: 137 MMOL/L — SIGNIFICANT CHANGE UP (ref 135–146)
TROPONIN T SERPL-MCNC: <0.01 NG/ML — SIGNIFICANT CHANGE UP
WBC # BLD: 8.72 K/UL — SIGNIFICANT CHANGE UP (ref 4.8–10.8)
WBC # FLD AUTO: 8.72 K/UL — SIGNIFICANT CHANGE UP (ref 4.8–10.8)

## 2023-05-12 PROCEDURE — 86900 BLOOD TYPING SEROLOGIC ABO: CPT

## 2023-05-12 PROCEDURE — 85018 HEMOGLOBIN: CPT

## 2023-05-12 PROCEDURE — 93325 DOPPLER ECHO COLOR FLOW MAPG: CPT

## 2023-05-12 PROCEDURE — 84100 ASSAY OF PHOSPHORUS: CPT

## 2023-05-12 PROCEDURE — 71045 X-RAY EXAM CHEST 1 VIEW: CPT | Mod: 26

## 2023-05-12 PROCEDURE — 87070 CULTURE OTHR SPECIMN AEROBIC: CPT

## 2023-05-12 PROCEDURE — 92610 EVALUATE SWALLOWING FUNCTION: CPT | Mod: GN

## 2023-05-12 PROCEDURE — 94002 VENT MGMT INPAT INIT DAY: CPT

## 2023-05-12 PROCEDURE — 82803 BLOOD GASES ANY COMBINATION: CPT

## 2023-05-12 PROCEDURE — 71045 X-RAY EXAM CHEST 1 VIEW: CPT

## 2023-05-12 PROCEDURE — 80074 ACUTE HEPATITIS PANEL: CPT

## 2023-05-12 PROCEDURE — 85730 THROMBOPLASTIN TIME PARTIAL: CPT

## 2023-05-12 PROCEDURE — 82962 GLUCOSE BLOOD TEST: CPT

## 2023-05-12 PROCEDURE — 84295 ASSAY OF SERUM SODIUM: CPT

## 2023-05-12 PROCEDURE — 87086 URINE CULTURE/COLONY COUNT: CPT

## 2023-05-12 PROCEDURE — 80053 COMPREHEN METABOLIC PANEL: CPT

## 2023-05-12 PROCEDURE — 84484 ASSAY OF TROPONIN QUANT: CPT

## 2023-05-12 PROCEDURE — 81003 URINALYSIS AUTO W/O SCOPE: CPT

## 2023-05-12 PROCEDURE — C9113: CPT

## 2023-05-12 PROCEDURE — 70450 CT HEAD/BRAIN W/O DYE: CPT

## 2023-05-12 PROCEDURE — 81001 URINALYSIS AUTO W/SCOPE: CPT

## 2023-05-12 PROCEDURE — 87635 SARS-COV-2 COVID-19 AMP PRB: CPT

## 2023-05-12 PROCEDURE — 76705 ECHO EXAM OF ABDOMEN: CPT

## 2023-05-12 PROCEDURE — 85610 PROTHROMBIN TIME: CPT

## 2023-05-12 PROCEDURE — 86850 RBC ANTIBODY SCREEN: CPT

## 2023-05-12 PROCEDURE — 99223 1ST HOSP IP/OBS HIGH 75: CPT

## 2023-05-12 PROCEDURE — 84443 ASSAY THYROID STIM HORMONE: CPT

## 2023-05-12 PROCEDURE — 84145 PROCALCITONIN (PCT): CPT

## 2023-05-12 PROCEDURE — 87641 MR-STAPH DNA AMP PROBE: CPT

## 2023-05-12 PROCEDURE — 80048 BASIC METABOLIC PNL TOTAL CA: CPT

## 2023-05-12 PROCEDURE — 84436 ASSAY OF TOTAL THYROXINE: CPT

## 2023-05-12 PROCEDURE — 76830 TRANSVAGINAL US NON-OB: CPT

## 2023-05-12 PROCEDURE — 87640 STAPH A DNA AMP PROBE: CPT

## 2023-05-12 PROCEDURE — 85025 COMPLETE CBC W/AUTO DIFF WBC: CPT

## 2023-05-12 PROCEDURE — 93306 TTE W/DOPPLER COMPLETE: CPT

## 2023-05-12 PROCEDURE — 93320 DOPPLER ECHO COMPLETE: CPT

## 2023-05-12 PROCEDURE — 0225U NFCT DS DNA&RNA 21 SARSCOV2: CPT

## 2023-05-12 PROCEDURE — 71275 CT ANGIOGRAPHY CHEST: CPT

## 2023-05-12 PROCEDURE — 84132 ASSAY OF SERUM POTASSIUM: CPT

## 2023-05-12 PROCEDURE — 97162 PT EVAL MOD COMPLEX 30 MIN: CPT | Mod: GP

## 2023-05-12 PROCEDURE — 92960 CARDIOVERSION ELECTRIC EXT: CPT

## 2023-05-12 PROCEDURE — 83735 ASSAY OF MAGNESIUM: CPT

## 2023-05-12 PROCEDURE — 83605 ASSAY OF LACTIC ACID: CPT

## 2023-05-12 PROCEDURE — 93005 ELECTROCARDIOGRAM TRACING: CPT

## 2023-05-12 PROCEDURE — 99291 CRITICAL CARE FIRST HOUR: CPT

## 2023-05-12 PROCEDURE — 87040 BLOOD CULTURE FOR BACTERIA: CPT

## 2023-05-12 PROCEDURE — 82330 ASSAY OF CALCIUM: CPT

## 2023-05-12 PROCEDURE — 86901 BLOOD TYPING SEROLOGIC RH(D): CPT

## 2023-05-12 PROCEDURE — 94003 VENT MGMT INPAT SUBQ DAY: CPT

## 2023-05-12 PROCEDURE — 97116 GAIT TRAINING THERAPY: CPT | Mod: GP

## 2023-05-12 PROCEDURE — 93312 ECHO TRANSESOPHAGEAL: CPT

## 2023-05-12 PROCEDURE — 97110 THERAPEUTIC EXERCISES: CPT | Mod: GP

## 2023-05-12 PROCEDURE — 85014 HEMATOCRIT: CPT

## 2023-05-12 PROCEDURE — 94760 N-INVAS EAR/PLS OXIMETRY 1: CPT

## 2023-05-12 PROCEDURE — 85027 COMPLETE CBC AUTOMATED: CPT

## 2023-05-12 PROCEDURE — 36415 COLL VENOUS BLD VENIPUNCTURE: CPT

## 2023-05-12 PROCEDURE — 85379 FIBRIN DEGRADATION QUANT: CPT

## 2023-05-12 RX ORDER — ENOXAPARIN SODIUM 100 MG/ML
60 INJECTION SUBCUTANEOUS ONCE
Refills: 0 | Status: COMPLETED | OUTPATIENT
Start: 2023-05-12 | End: 2023-05-12

## 2023-05-12 RX ORDER — SODIUM CHLORIDE 9 MG/ML
1000 INJECTION, SOLUTION INTRAVENOUS ONCE
Refills: 0 | Status: COMPLETED | OUTPATIENT
Start: 2023-05-12 | End: 2023-05-12

## 2023-05-12 RX ORDER — DILTIAZEM HCL 120 MG
20 CAPSULE, EXT RELEASE 24 HR ORAL ONCE
Refills: 0 | Status: COMPLETED | OUTPATIENT
Start: 2023-05-12 | End: 2023-05-12

## 2023-05-12 RX ORDER — ENOXAPARIN SODIUM 100 MG/ML
60 INJECTION SUBCUTANEOUS EVERY 12 HOURS
Refills: 0 | Status: DISCONTINUED | OUTPATIENT
Start: 2023-05-13 | End: 2023-05-13

## 2023-05-12 RX ORDER — DILTIAZEM HCL 120 MG
60 CAPSULE, EXT RELEASE 24 HR ORAL ONCE
Refills: 0 | Status: COMPLETED | OUTPATIENT
Start: 2023-05-12 | End: 2023-05-12

## 2023-05-12 RX ADMIN — SODIUM CHLORIDE 1000 MILLILITER(S): 9 INJECTION, SOLUTION INTRAVENOUS at 15:15

## 2023-05-12 RX ADMIN — SODIUM CHLORIDE 1000 MILLILITER(S): 9 INJECTION, SOLUTION INTRAVENOUS at 16:15

## 2023-05-12 RX ADMIN — ENOXAPARIN SODIUM 60 MILLIGRAM(S): 100 INJECTION SUBCUTANEOUS at 20:40

## 2023-05-12 RX ADMIN — Medication 20 MILLIGRAM(S): at 15:15

## 2023-05-12 RX ADMIN — Medication 60 MILLIGRAM(S): at 16:40

## 2023-05-12 NOTE — ED ADULT NURSE NOTE - NS ED NURSE TRANSPORT WITH
w/ rt, rn and transport/Cardiac Monitor/Defib/ACLS/Rescue Kit/O2/BVM/oxygen/IV pump/pulse ox/transvenous pacer/ventilator

## 2023-05-12 NOTE — H&P ADULT - NSHPPHYSICALEXAM_GEN_ALL_CORE
PHYSICAL EXAM:  GENERAL: NAD, lying in bed comfortably  HEAD:  Atraumatic, Normocephalic  EYES: EOMI, PERRLA, conjunctiva and sclera clear  ENT: Moist mucous membranes  NECK: Supple, No JVD  CHEST/LUNG: Clear to auscultation bilaterally; No rales, rhonchi, wheezing, or rubs. Unlabored respirations  HEART: Rapid rate, irregular rhythm; No murmurs, rubs, or gallops  ABDOMEN: Bowel sounds present; Soft, Nontender, Nondistended. No hepatomegaly  EXTREMITIES:  2+ Peripheral Pulses, brisk capillary refill. No clubbing, cyanosis, or edema  NERVOUS SYSTEM:  Alert & Oriented X3, speech clear. No deficits   MSK: FROM all 4 extremities, full and equal strength  SKIN: No rashes or lesions

## 2023-05-12 NOTE — H&P ADULT - NSHPLABSRESULTS_GEN_ALL_CORE
LABS:  cret                        12.8   8.72  )-----------( 328      ( 12 May 2023 16:10 )             38.6     05-12    137  |  101  |  12  ----------------------------<  99  4.3   |  22  |  0.9    Ca    9.6      12 May 2023 16:10    TPro  6.8  /  Alb  4.0  /  TBili  0.5  /  DBili  x   /  AST  54<H>  /  ALT  60<H>  /  AlkPhos  137<H>  05-12 no

## 2023-05-12 NOTE — ED PROVIDER NOTE - PHYSICAL EXAMINATION
CONSTITUTIONAL: NAD  SKIN: Warm dry  HEAD: NCAT  EYES: NL inspection  ENT: MMM  NECK: Supple; non tender.  CARD: Irreg Irreg  RESP: CTAB  ABD: S/NT no R/G  EXT: no pedal edema  NEURO: Grossly unremarkable  PSYCH: Cooperative, appropriate.

## 2023-05-12 NOTE — ED ADULT NURSE NOTE - NSFALLHARMRISKINTERV_ED_ALL_ED

## 2023-05-12 NOTE — ED PROVIDER NOTE - PROGRESS NOTE DETAILS
JUAN: pt feels well, cardioverted with 20mg IV Cardizem. Rate now in 90's. Admitted to [facility]-tele

## 2023-05-12 NOTE — ED ADULT NURSE NOTE - BOWEL SOUNDS RLQ
Electrophysiology - PROGRESS NOTE    Admit Date: 3/10/2020     Chief Complaint: MAT, tachycardia     Interval History:   Patient seen and examined and notes reviewed. Patient is being followed for MAT, tachycardia. Patient somnolent and not answering, appears to have more upper airway congestion. Has had poor po intake. NGT to be placed today. In: 360 [P.O.:250;  I.V.:10; NG/GT:100]  Out: 1903    Wt Readings from Last 2 Encounters:   03/16/20 176 lb 9.4 oz (80.1 kg)   10/27/19 146 lb (66.2 kg)       Data:   Scheduled Meds:   Scheduled Meds:   insulin lispro  0-12 Units Subcutaneous TID WC    insulin lispro  0-6 Units Subcutaneous Nightly    insulin lispro  5 Units Subcutaneous TID WC    [START ON 3/17/2020] atorvastatin  10 mg Per NG tube Daily    [START ON 3/17/2020] clopidogrel  75 mg Per NG tube Daily    dilTIAZem  15 mg Per NG tube 4 times per day    metoprolol tartrate  12.5 mg Per NG tube Q6H    midodrine  10 mg Per NG tube TID WC    polyethylene glycol  17 g Per NG tube BID    lanthanum  1,000 mg Per NG tube TID     custom dialysis builder   Intraperitoneal Once    meropenem  500 mg Intravenous Q24H    potassium chloride  40 mEq Oral Once    insulin glargine  15 Units Subcutaneous Daily    Venelex   Topical BID    darbepoetin sandeep-polysorbate  60 mcg Subcutaneous Weekly    aspirin  81 mg Oral Daily    sodium chloride flush  10 mL Intravenous 2 times per day    heparin (porcine)  5,000 Units Subcutaneous 3 times per day     Continuous Infusions:   norepinephrine Stopped (03/14/20 1200)    dextrose       PRN Meds:.bisacodyl, prochlorperazine, glucose, dextrose, glucagon (rDNA), dextrose, sodium chloride flush, acetaminophen **OR** acetaminophen, polyethylene glycol  Continuous Infusions:   norepinephrine Stopped (03/14/20 1200)    dextrose         Intake/Output Summary (Last 24 hours) at 3/16/2020 1041  Last data filed at present

## 2023-05-12 NOTE — H&P ADULT - ATTENDING COMMENTS
Patient seen and examined at bedside independently of the residents. I read the resident's note and agree with the plan with the additions and corrections as noted below. My note supersedes the resident's note.     REVIEW OF SYSTEMS:  Negative except in HPI.     PMH: NSCLC stage 3 s/p CRT and surgery 2018, emphysema, severe mitral valve regurgitation s/p MVR, paroxysmal atrial fibrillation previously on coumadin but stopped after MVR and KOBY closure, former smoker    FHx: Reviewed. No fhx of asthma/copd, No fhx of liver and pulmonary disease. No fhx of hematological disorder.     Physical Exam:  GEN: No acute distress. Awake, Alert and oriented x 3.   Head: Atraumatic, Normocephalic.   Eye: PEERLA. No sclera icterus. EOMI.   ENT: Normal oropharynx, no thyromegaly, no mass, no lymphadenopathy.   LUNGS: Clear to auscultation bilaterally. No wheeze/rales/crackles.   HEART: Normal. S1/S2 present. RRR. No murmur/gallops.   ABD: Soft, non-tender, non-distended. Bowel sounds present.   EXT: No pitting edema. No erythema. No tenderness.  Integumentary: No rash, No sore, No petechia.   NEURO: CN III-XII intact. Strength: 5/5 b/l ULE. Sensory intact b/l ULE.     Vital Signs Last 24 Hrs  T(C): 36.5 (12 May 2023 23:34), Max: 36.7 (12 May 2023 14:45)  T(F): 97.7 (12 May 2023 23:34), Max: 98 (12 May 2023 14:45)  HR: 101 (13 May 2023 03:10) (94 - 166)  BP: 105/56 (13 May 2023 03:10) (90/70 - 162/67)  BP(mean): 79 (12 May 2023 23:34) (79 - 79)  RR: 19 (12 May 2023 15:30) (16 - 20)  SpO2: 99% (13 May 2023 03:10) (94% - 99%)    Parameters below as of 12 May 2023 23:34  Patient On (Oxygen Delivery Method): room air      Please see the above notes for Labs and radiology.     Assessment and Plan:     76 yo F with hx of NSCLC stage 3 s/p CRT and surgery 2018, emphysema, severe mitral valve regurgitation s/p MVR, paroxysmal atrial fibrillation previously on coumadin but stopped after MVR and KOBY closure, former smoker presenting with progressive shortness of breath on exertion, and intermittent dry cough lasting for past 2 weeks.    SOB   - DDx: CAP vs. CHF  - CXR shows retrocardiac opacity with b/l hilar congestion to my read --> pending official report.   - proBNP is elevated ~ 6227, however patient clinically does not look fluid overloaded.   - will get CT chest and TTE   - start on azithromycin and ceftriaxone for now.   - atypical PNA panel, procalcitonin  - supportive care.     A.fib RVR   - currently HR ~ 120.   - patient is taking Toprol 25mg qd at home.   - will start on PO lopressor 25mg TID (BP ~ 118/75).   - check TTE and TSH   - monitor on Telemetry  - EP (Dr. Martinez) consulted.     NSCLC stage 3 s/p CRT and surgery - f/u CT chest.     DVT ppx: Lovenox SC  GI ppx: PPI  Diet: DASH diet  Activity: as tolerated.     Date seen by the attendin2023  Total time spent: 75 minutes.

## 2023-05-12 NOTE — H&P ADULT - ASSESSMENT
78yo F hx of NSCLC stage 3 s/p CRT and surgery 2018, emphysema, severe mitral valve regurgitation s/p MVR, paroxysmal atrial fibrillation previously on coumadin but stopped after MVR and KOBY closure, former smoker presenting with progressive shortness of breath on exertion, and intermittent dry cough lasting for past 2 weeks.    #Paroxysmal Atrial fibrillation with RVR- improving  #Hx of severe MVR s/p MVR  #s/p KOBY closure  #Elevated BNP  - cont telemetry  - check EKG in AM  - check TSH  - TTE  - cont BB  - trend trop  - consider cardio consult if HR not well controlled with BBs  - therapeutic AC        #Hx of emphysema  #Hx of NSCLC s/p CRT and surgery    #DVT ppx: therapeutic lovenox  #GI ppx: none  #Diet: DASH/TLC  #Code: full  #Activity: IAT  #Dispo: acute. cont telemetry 76yo F hx of NSCLC stage 3 s/p CRT and surgery 2018, emphysema, severe mitral valve regurgitation s/p MVR, paroxysmal atrial fibrillation previously on coumadin but stopped after MVR and KOBY closure, former smoker presenting with progressive shortness of breath on exertion, and intermittent dry cough lasting for past 2 weeks.    #Paroxysmal Atrial fibrillation with RVR- improving  #Hx of severe MVR s/p MVR  #s/p KOBY closure  #Elevated BNP  - euvolemic on exam. on RA. holding diuretics  - cont telemetry  - check EKG in AM  - check TSH  - TTE  - increase metoprolol to 75mg QD (metoprolol tartrate 25mg TID)  - HR currently in 120s. give IV 5mg metoprolol now x1  - trend trop  - cardio consult with EP (previously was seeing Dr. Martinez) for HR control and AC recs    #LLL opacity/pleural effusion? vs PNA?  - Chest Xray findings noted  - CT Chest with IV contrast for evaluation  - check procal  - azithromycin + ceftriaxone for now    #transaminitis  - RUQ US  - sertraline rarely causes transaminitis    #Hx of anxiety  - cont sertraline 25    #Hx of emphysema  #Hx of NSCLC s/p CRT and surgery    #DVT ppx: prophylactic DVT ppx for now  #GI ppx: none  #Diet: DASH/TLC  #Code: full  #Activity: IAT  #Dispo: acute. cont telemetry

## 2023-05-12 NOTE — ED PROVIDER NOTE - CLINICAL SUMMARY MEDICAL DECISION MAKING FREE TEXT BOX
.    Patient with A-fib RVR.  Patient rate controlled with IV and p.o. diltiazem.  Patient felt better, no acute ED events.  All available lab tests, imaging tests, and EKGs independently reviewed and interpreted by me.  Troponin negative, BNP elevated.  Patient given Lovenox for [id number] equals 4.  Patient admitted to [facility] telemetry for further work-up and management    .

## 2023-05-12 NOTE — ED PROVIDER NOTE - OBJECTIVE STATEMENT
69-year-old female, [facility] lung CA status postchemotherapy rad 2018, MVR repair, not on blood thinners, presents with progressive shortness of breath and intermittent cough x2 weeks.  Was seen by PMD started on medications for cough without improvement.  No chest pain, leg swelling, recent travel or sick contacts.  Patient has not had prior episode of A-fib.  No vomiting, diarrhea, or abnormal bleeding.

## 2023-05-12 NOTE — H&P ADULT - HISTORY OF PRESENT ILLNESS
76yo F hx of NSCLC stage 3 s/p CRT and surgery 2018, emphysema, severe mitral valve regurgitation s/p MVR, paroxysmal atrial fibrillation previously on coumadin but stopped after MVR and KOBY closure, former smoker presenting with progressive shortness of breath on exertion, and intermittent dry cough lasting for past 2 weeks. Denies chest pain, palpitations, headaches, wheezing, abdominal pain, fevers, sick contacts, nausea/vomiting. Was prescribed meds from PMD without symptomatic relief. Presented to the ED from home.    At the ED, VS- T: 36.7C, BP: 90/70, HR: 160s, SpO2: 99% on RA, RR: 16. Labs significant for BNP- 6227, and elevated AST/ALT (54/60). Troponin<0.01 x1. EKG showed atrial fibrillation with RVR. CT Chest NC showed unchanged RLL GGO.     Admitted to telemetry for afib with RVR. Given 60mg x1 therapeutic lovenox. 1L LR bolus. For afib with  RVR, was given 20mg IVP diltiazem and 60mg PO. HR improved to 94.

## 2023-05-12 NOTE — ED ADULT NURSE NOTE - NSFALLASSESSNEED_ED_ALL_ED
Melba Lindsay  MR#: [id number]  : 1950  ACCOUNT #: [de-identified]   ADMIT DATE: 2018    CHIEF COMPLAINT:  Decreased vision of the left eye. HISTORY OF PRESENT ILLNESS:  The patient is a 21-year-old white male with a 1-day history of a reddish cloud or bubble beginning inferiorly that is now covering the center of his vision. He describes it as being like an eclipse starting from the nose to the center of the vision. He has not had any past history of any surgeries or injuries to either eye. Visual acuity in the right eye was found to be 20/25 uncorrected, the left eye is counting fingers. 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 The right eye has multiple retinal defects at the superior and inferior portions of the retina. He will be admitted for laser on the right eye to seal all the retinal defects. On the left eye, depending on the amount of subretinal fluid we will decide on whether to do this as a straight scleral buckling or straight vitrectomy or as a vitrectomy scleral buckling procedure. He ate a large meal at lunch today and since we would not be able to get started until late, I feel that since he has a macula off detachment, that we can wait until tomorrow afternoon to do this surgery. PAST MEDICAL HISTORY:  Positive for hypertension. He also has some seasonal allergies, some arthritis and benign prostatic enlargement. PAST SURGICAL HISTORY:  Include knee surgery and tonsillectomy. SOCIAL HISTORY:  He is a former smoker. FAMILY HISTORY:  He has no family history of retinal detachment. CURRENT MEDICATIONS:  Include Cialis, clobetasol, fish oil, labetalol, Uloric, valsartan. ALLERGIES:  HE HAS ALLERGIES TO SULFA.     REVIEW OF SYSTEMS:  GENERAL:  He is awake, alert, oriented x3 with an appropriate affect. HEENT:  His eye examination is discussed above, but he does have +1 nuclear sclerosis in both eyes and a posterior vitreous detachment in both. HEART:  Has a regular rate and rhythm. LUNGS:  Clear bilaterally. 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 PLAN:  Laser photocoagulation to the right eye. The left eye will be treated with a combination of vitrectomy, scleral buckling or the two together. It will depend on the amount of subretinal fluid that is present when we see him at surgery tomorrow. I have discussed this with him. I have discussed the pros and cons of each surgery. I have discussed the pros and cons of using C3F8 and SF6 gases. He understands the surgeries and the potential risks. SUSI GONZALESPhoebe Putney Memorial Hospital - North CampusMD MAN/MARGIE  D: 03/22/2018 17:34     T: 03/22/2018 19:03  JOB #: [id number] no

## 2023-05-12 NOTE — H&P ADULT - NSICDXPASTMEDICALHX_GEN_ALL_CORE_FT
PAST MEDICAL HISTORY:  H/O mitral valve replacement     HTN (hypertension)     Non-small cell lung cancer     Paroxysmal atrial fibrillation     Severe mitral valve regurgitation

## 2023-05-13 LAB
ALBUMIN SERPL ELPH-MCNC: 3.1 G/DL — LOW (ref 3.5–5.2)
ALBUMIN SERPL ELPH-MCNC: 3.5 G/DL — SIGNIFICANT CHANGE UP (ref 3.5–5.2)
ALBUMIN SERPL ELPH-MCNC: 3.5 G/DL — SIGNIFICANT CHANGE UP (ref 3.5–5.2)
ALP SERPL-CCNC: 122 U/L — HIGH (ref 30–115)
ALP SERPL-CCNC: 128 U/L — HIGH (ref 30–115)
ALP SERPL-CCNC: 141 U/L — HIGH (ref 30–115)
ALT FLD-CCNC: 54 U/L — HIGH (ref 0–41)
ALT FLD-CCNC: 72 U/L — HIGH (ref 0–41)
ALT FLD-CCNC: 74 U/L — HIGH (ref 0–41)
ANION GAP SERPL CALC-SCNC: 11 MMOL/L — SIGNIFICANT CHANGE UP (ref 7–14)
ANION GAP SERPL CALC-SCNC: 14 MMOL/L — SIGNIFICANT CHANGE UP (ref 7–14)
ANION GAP SERPL CALC-SCNC: 18 MMOL/L — HIGH (ref 7–14)
AST SERPL-CCNC: 105 U/L — HIGH (ref 0–41)
AST SERPL-CCNC: 54 U/L — HIGH (ref 0–41)
AST SERPL-CCNC: 77 U/L — HIGH (ref 0–41)
BASE EXCESS BLDMV CALC-SCNC: 5.5 MMOL/L — SIGNIFICANT CHANGE UP
BASE EXCESS BLDV CALC-SCNC: -10.6 MMOL/L — LOW (ref -2–3)
BASE EXCESS BLDV CALC-SCNC: 0.5 MMOL/L — SIGNIFICANT CHANGE UP (ref -2–3)
BASE EXCESS BLDV CALC-SCNC: 4.2 MMOL/L — HIGH (ref -2–3)
BASOPHILS # BLD AUTO: 0.05 K/UL — SIGNIFICANT CHANGE UP (ref 0–0.2)
BASOPHILS # BLD AUTO: 0.05 K/UL — SIGNIFICANT CHANGE UP (ref 0–0.2)
BASOPHILS # BLD AUTO: 0.07 K/UL — SIGNIFICANT CHANGE UP (ref 0–0.2)
BASOPHILS NFR BLD AUTO: 0.3 % — SIGNIFICANT CHANGE UP (ref 0–1)
BASOPHILS NFR BLD AUTO: 0.4 % — SIGNIFICANT CHANGE UP (ref 0–1)
BASOPHILS NFR BLD AUTO: 0.7 % — SIGNIFICANT CHANGE UP (ref 0–1)
BILIRUB SERPL-MCNC: 0.5 MG/DL — SIGNIFICANT CHANGE UP (ref 0.2–1.2)
BILIRUB SERPL-MCNC: 0.5 MG/DL — SIGNIFICANT CHANGE UP (ref 0.2–1.2)
BILIRUB SERPL-MCNC: 0.8 MG/DL — SIGNIFICANT CHANGE UP (ref 0.2–1.2)
BUN SERPL-MCNC: 12 MG/DL — SIGNIFICANT CHANGE UP (ref 10–20)
BUN SERPL-MCNC: 13 MG/DL — SIGNIFICANT CHANGE UP (ref 10–20)
BUN SERPL-MCNC: 15 MG/DL — SIGNIFICANT CHANGE UP (ref 10–20)
CA-I SERPL-SCNC: 1.02 MMOL/L — LOW (ref 1.15–1.33)
CA-I SERPL-SCNC: 1.13 MMOL/L — LOW (ref 1.15–1.33)
CALCIUM SERPL-MCNC: 8.3 MG/DL — LOW (ref 8.4–10.5)
CALCIUM SERPL-MCNC: 8.9 MG/DL — SIGNIFICANT CHANGE UP (ref 8.4–10.5)
CALCIUM SERPL-MCNC: 9.1 MG/DL — SIGNIFICANT CHANGE UP (ref 8.4–10.5)
CHLORIDE SERPL-SCNC: 103 MMOL/L — SIGNIFICANT CHANGE UP (ref 98–110)
CHLORIDE SERPL-SCNC: 105 MMOL/L — SIGNIFICANT CHANGE UP (ref 98–110)
CHLORIDE SERPL-SCNC: 99 MMOL/L — SIGNIFICANT CHANGE UP (ref 98–110)
CO2 SERPL-SCNC: 14 MMOL/L — LOW (ref 17–32)
CO2 SERPL-SCNC: 26 MMOL/L — SIGNIFICANT CHANGE UP (ref 17–32)
CO2 SERPL-SCNC: 27 MMOL/L — SIGNIFICANT CHANGE UP (ref 17–32)
CREAT SERPL-MCNC: 1 MG/DL — SIGNIFICANT CHANGE UP (ref 0.7–1.5)
CREAT SERPL-MCNC: 1 MG/DL — SIGNIFICANT CHANGE UP (ref 0.7–1.5)
CREAT SERPL-MCNC: 1.1 MG/DL — SIGNIFICANT CHANGE UP (ref 0.7–1.5)
D DIMER BLD IA.RAPID-MCNC: 753 NG/ML DDU — HIGH
EGFR: 51 ML/MIN/1.73M2 — LOW
EGFR: 58 ML/MIN/1.73M2 — LOW
EGFR: 58 ML/MIN/1.73M2 — LOW
EOSINOPHIL # BLD AUTO: 0.01 K/UL — SIGNIFICANT CHANGE UP (ref 0–0.7)
EOSINOPHIL # BLD AUTO: 0.02 K/UL — SIGNIFICANT CHANGE UP (ref 0–0.7)
EOSINOPHIL # BLD AUTO: 0.06 K/UL — SIGNIFICANT CHANGE UP (ref 0–0.7)
EOSINOPHIL NFR BLD AUTO: 0.1 % — SIGNIFICANT CHANGE UP (ref 0–8)
EOSINOPHIL NFR BLD AUTO: 0.2 % — SIGNIFICANT CHANGE UP (ref 0–8)
EOSINOPHIL NFR BLD AUTO: 0.6 % — SIGNIFICANT CHANGE UP (ref 0–8)
GAS PNL BLDA: SIGNIFICANT CHANGE UP
GAS PNL BLDA: SIGNIFICANT CHANGE UP
GAS PNL BLDMV: SIGNIFICANT CHANGE UP
GAS PNL BLDV: 135 MMOL/L — LOW (ref 136–145)
GAS PNL BLDV: 136 MMOL/L — SIGNIFICANT CHANGE UP (ref 136–145)
GAS PNL BLDV: SIGNIFICANT CHANGE UP
GLUCOSE BLDC GLUCOMTR-MCNC: 157 MG/DL — HIGH (ref 70–99)
GLUCOSE BLDC GLUCOMTR-MCNC: 203 MG/DL — HIGH (ref 70–99)
GLUCOSE SERPL-MCNC: 120 MG/DL — HIGH (ref 70–99)
GLUCOSE SERPL-MCNC: 181 MG/DL — HIGH (ref 70–99)
GLUCOSE SERPL-MCNC: 241 MG/DL — HIGH (ref 70–99)
HAV IGM SER-ACNC: SIGNIFICANT CHANGE UP
HBV CORE IGM SER-ACNC: SIGNIFICANT CHANGE UP
HBV SURFACE AG SER-ACNC: SIGNIFICANT CHANGE UP
HCO3 BLDMV-SCNC: 32 MMOL/L — SIGNIFICANT CHANGE UP
HCO3 BLDV-SCNC: 20 MMOL/L — LOW (ref 22–29)
HCO3 BLDV-SCNC: 27 MMOL/L — SIGNIFICANT CHANGE UP (ref 22–29)
HCO3 BLDV-SCNC: 30 MMOL/L — HIGH (ref 22–29)
HCT VFR BLD CALC: 36.3 % — LOW (ref 37–47)
HCT VFR BLD CALC: 40.5 % — SIGNIFICANT CHANGE UP (ref 37–47)
HCT VFR BLD CALC: 43.3 % — SIGNIFICANT CHANGE UP (ref 37–47)
HCT VFR BLDA CALC: 38 % — LOW (ref 39–51)
HCT VFR BLDA CALC: 43 % — SIGNIFICANT CHANGE UP (ref 39–51)
HCV AB S/CO SERPL IA: 0.14 S/CO — SIGNIFICANT CHANGE UP (ref 0–0.99)
HCV AB SERPL-IMP: SIGNIFICANT CHANGE UP
HGB BLD CALC-MCNC: 12.7 G/DL — SIGNIFICANT CHANGE UP (ref 12.6–17.4)
HGB BLD CALC-MCNC: 14.2 G/DL — SIGNIFICANT CHANGE UP (ref 12.6–17.4)
HGB BLD-MCNC: 12 G/DL — SIGNIFICANT CHANGE UP (ref 12–16)
HGB BLD-MCNC: 13.4 G/DL — SIGNIFICANT CHANGE UP (ref 12–16)
HGB BLD-MCNC: 13.9 G/DL — SIGNIFICANT CHANGE UP (ref 12–16)
HOROWITZ INDEX BLDV+IHG-RTO: 50 — SIGNIFICANT CHANGE UP
IMM GRANULOCYTES NFR BLD AUTO: 0.7 % — HIGH (ref 0.1–0.3)
IMM GRANULOCYTES NFR BLD AUTO: 1.2 % — HIGH (ref 0.1–0.3)
IMM GRANULOCYTES NFR BLD AUTO: 1.4 % — HIGH (ref 0.1–0.3)
LACTATE BLDV-MCNC: 1.3 MMOL/L — SIGNIFICANT CHANGE UP (ref 0.5–2)
LACTATE BLDV-MCNC: 1.5 MMOL/L — SIGNIFICANT CHANGE UP (ref 0.5–2)
LACTATE BLDV-MCNC: 4.1 MMOL/L — CRITICAL HIGH (ref 0.5–2)
LACTATE SERPL-SCNC: 0.9 MMOL/L — SIGNIFICANT CHANGE UP (ref 0.7–2)
LACTATE SERPL-SCNC: 1 MMOL/L — SIGNIFICANT CHANGE UP (ref 0.7–2)
LACTATE SERPL-SCNC: 1.9 MMOL/L — SIGNIFICANT CHANGE UP (ref 0.7–2)
LYMPHOCYTES # BLD AUTO: 0.55 K/UL — LOW (ref 1.2–3.4)
LYMPHOCYTES # BLD AUTO: 1.14 K/UL — LOW (ref 1.2–3.4)
LYMPHOCYTES # BLD AUTO: 2.42 K/UL — SIGNIFICANT CHANGE UP (ref 1.2–3.4)
LYMPHOCYTES # BLD AUTO: 24.8 % — SIGNIFICANT CHANGE UP (ref 20.5–51.1)
LYMPHOCYTES # BLD AUTO: 3.3 % — LOW (ref 20.5–51.1)
LYMPHOCYTES # BLD AUTO: 9.1 % — LOW (ref 20.5–51.1)
MAGNESIUM SERPL-MCNC: 1.6 MG/DL — LOW (ref 1.8–2.4)
MAGNESIUM SERPL-MCNC: 1.9 MG/DL — SIGNIFICANT CHANGE UP (ref 1.8–2.4)
MAGNESIUM SERPL-MCNC: 2 MG/DL — SIGNIFICANT CHANGE UP (ref 1.8–2.4)
MCHC RBC-ENTMCNC: 30.3 PG — SIGNIFICANT CHANGE UP (ref 27–31)
MCHC RBC-ENTMCNC: 30.9 G/DL — LOW (ref 32–37)
MCHC RBC-ENTMCNC: 31 PG — SIGNIFICANT CHANGE UP (ref 27–31)
MCHC RBC-ENTMCNC: 31.4 PG — HIGH (ref 27–31)
MCHC RBC-ENTMCNC: 33.1 G/DL — SIGNIFICANT CHANGE UP (ref 32–37)
MCHC RBC-ENTMCNC: 34.3 G/DL — SIGNIFICANT CHANGE UP (ref 32–37)
MCV RBC AUTO: 91.4 FL — SIGNIFICANT CHANGE UP (ref 81–99)
MCV RBC AUTO: 93.8 FL — SIGNIFICANT CHANGE UP (ref 81–99)
MCV RBC AUTO: 98 FL — SIGNIFICANT CHANGE UP (ref 81–99)
MONOCYTES # BLD AUTO: 0.61 K/UL — HIGH (ref 0.1–0.6)
MONOCYTES # BLD AUTO: 0.86 K/UL — HIGH (ref 0.1–0.6)
MONOCYTES # BLD AUTO: 0.9 K/UL — HIGH (ref 0.1–0.6)
MONOCYTES NFR BLD AUTO: 5.2 % — SIGNIFICANT CHANGE UP (ref 1.7–9.3)
MONOCYTES NFR BLD AUTO: 6.3 % — SIGNIFICANT CHANGE UP (ref 1.7–9.3)
MONOCYTES NFR BLD AUTO: 7.2 % — SIGNIFICANT CHANGE UP (ref 1.7–9.3)
NEUTROPHILS # BLD AUTO: 10.31 K/UL — HIGH (ref 1.4–6.5)
NEUTROPHILS # BLD AUTO: 14.95 K/UL — HIGH (ref 1.4–6.5)
NEUTROPHILS # BLD AUTO: 6.45 K/UL — SIGNIFICANT CHANGE UP (ref 1.4–6.5)
NEUTROPHILS NFR BLD AUTO: 66.2 % — SIGNIFICANT CHANGE UP (ref 42.2–75.2)
NEUTROPHILS NFR BLD AUTO: 82.4 % — HIGH (ref 42.2–75.2)
NEUTROPHILS NFR BLD AUTO: 89.9 % — HIGH (ref 42.2–75.2)
NRBC # BLD: 0 /100 WBCS — SIGNIFICANT CHANGE UP (ref 0–0)
O2 CT VFR BLD CALC: 50 MMHG — SIGNIFICANT CHANGE UP
PCO2 BLDMV: 51 MMHG — SIGNIFICANT CHANGE UP
PCO2 BLDV: 47 MMHG — HIGH (ref 39–42)
PCO2 BLDV: 50 MMHG — HIGH (ref 39–42)
PCO2 BLDV: 64 MMHG — HIGH (ref 39–42)
PH BLDMV: 7.4 — SIGNIFICANT CHANGE UP
PH BLDV: 7.1 — LOW (ref 7.32–7.43)
PH BLDV: 7.34 — SIGNIFICANT CHANGE UP (ref 7.32–7.43)
PH BLDV: 7.41 — SIGNIFICANT CHANGE UP (ref 7.32–7.43)
PHOSPHATE SERPL-MCNC: 4.8 MG/DL — SIGNIFICANT CHANGE UP (ref 2.1–4.9)
PLATELET # BLD AUTO: 312 K/UL — SIGNIFICANT CHANGE UP (ref 130–400)
PLATELET # BLD AUTO: 330 K/UL — SIGNIFICANT CHANGE UP (ref 130–400)
PLATELET # BLD AUTO: 342 K/UL — SIGNIFICANT CHANGE UP (ref 130–400)
PMV BLD: 9.1 FL — SIGNIFICANT CHANGE UP (ref 7.4–10.4)
PMV BLD: 9.6 FL — SIGNIFICANT CHANGE UP (ref 7.4–10.4)
PMV BLD: 9.6 FL — SIGNIFICANT CHANGE UP (ref 7.4–10.4)
PO2 BLDV: 51 MMHG — SIGNIFICANT CHANGE UP
PO2 BLDV: 61 MMHG — SIGNIFICANT CHANGE UP
PO2 BLDV: 68 MMHG — SIGNIFICANT CHANGE UP
POTASSIUM BLDV-SCNC: 3.3 MMOL/L — LOW (ref 3.5–5.1)
POTASSIUM BLDV-SCNC: 4.1 MMOL/L — SIGNIFICANT CHANGE UP (ref 3.5–5.1)
POTASSIUM SERPL-MCNC: 3.4 MMOL/L — LOW (ref 3.5–5)
POTASSIUM SERPL-MCNC: 4.2 MMOL/L — SIGNIFICANT CHANGE UP (ref 3.5–5)
POTASSIUM SERPL-MCNC: 4.7 MMOL/L — SIGNIFICANT CHANGE UP (ref 3.5–5)
POTASSIUM SERPL-SCNC: 3.4 MMOL/L — LOW (ref 3.5–5)
POTASSIUM SERPL-SCNC: 4.2 MMOL/L — SIGNIFICANT CHANGE UP (ref 3.5–5)
POTASSIUM SERPL-SCNC: 4.7 MMOL/L — SIGNIFICANT CHANGE UP (ref 3.5–5)
PROCALCITONIN SERPL-MCNC: 0.03 NG/ML — SIGNIFICANT CHANGE UP (ref 0.02–0.1)
PROT SERPL-MCNC: 5.3 G/DL — LOW (ref 6–8)
PROT SERPL-MCNC: 6.3 G/DL — SIGNIFICANT CHANGE UP (ref 6–8)
PROT SERPL-MCNC: 6.3 G/DL — SIGNIFICANT CHANGE UP (ref 6–8)
RBC # BLD: 3.87 M/UL — LOW (ref 4.2–5.4)
RBC # BLD: 4.42 M/UL — SIGNIFICANT CHANGE UP (ref 4.2–5.4)
RBC # BLD: 4.43 M/UL — SIGNIFICANT CHANGE UP (ref 4.2–5.4)
RBC # FLD: 13.7 % — SIGNIFICANT CHANGE UP (ref 11.5–14.5)
RBC # FLD: 13.8 % — SIGNIFICANT CHANGE UP (ref 11.5–14.5)
RBC # FLD: 14 % — SIGNIFICANT CHANGE UP (ref 11.5–14.5)
SAO2 % BLDMV: 82 % — SIGNIFICANT CHANGE UP
SAO2 % BLDV: 84.7 % — SIGNIFICANT CHANGE UP
SAO2 % BLDV: 90.2 % — SIGNIFICANT CHANGE UP
SAO2 % BLDV: 91 % — SIGNIFICANT CHANGE UP
SARS-COV-2 RNA SPEC QL NAA+PROBE: SIGNIFICANT CHANGE UP
SODIUM SERPL-SCNC: 137 MMOL/L — SIGNIFICANT CHANGE UP (ref 135–146)
SODIUM SERPL-SCNC: 139 MMOL/L — SIGNIFICANT CHANGE UP (ref 135–146)
SODIUM SERPL-SCNC: 141 MMOL/L — SIGNIFICANT CHANGE UP (ref 135–146)
TROPONIN T SERPL-MCNC: <0.01 NG/ML — SIGNIFICANT CHANGE UP
TSH SERPL-MCNC: 7.26 UIU/ML — HIGH (ref 0.27–4.2)
WBC # BLD: 12.51 K/UL — HIGH (ref 4.8–10.8)
WBC # BLD: 16.62 K/UL — HIGH (ref 4.8–10.8)
WBC # BLD: 9.75 K/UL — SIGNIFICANT CHANGE UP (ref 4.8–10.8)
WBC # FLD AUTO: 12.51 K/UL — HIGH (ref 4.8–10.8)
WBC # FLD AUTO: 16.62 K/UL — HIGH (ref 4.8–10.8)
WBC # FLD AUTO: 9.75 K/UL — SIGNIFICANT CHANGE UP (ref 4.8–10.8)

## 2023-05-13 PROCEDURE — 93010 ELECTROCARDIOGRAM REPORT: CPT | Mod: 76

## 2023-05-13 PROCEDURE — 71045 X-RAY EXAM CHEST 1 VIEW: CPT | Mod: 26,77

## 2023-05-13 PROCEDURE — 99291 CRITICAL CARE FIRST HOUR: CPT

## 2023-05-13 PROCEDURE — 99292 CRITICAL CARE ADDL 30 MIN: CPT

## 2023-05-13 PROCEDURE — 93306 TTE W/DOPPLER COMPLETE: CPT | Mod: 26

## 2023-05-13 PROCEDURE — 71045 X-RAY EXAM CHEST 1 VIEW: CPT | Mod: 26,76

## 2023-05-13 PROCEDURE — 71275 CT ANGIOGRAPHY CHEST: CPT | Mod: 26

## 2023-05-13 PROCEDURE — 70450 CT HEAD/BRAIN W/O DYE: CPT | Mod: 26

## 2023-05-13 RX ORDER — ENOXAPARIN SODIUM 100 MG/ML
40 INJECTION SUBCUTANEOUS EVERY 24 HOURS
Refills: 0 | Status: DISCONTINUED | OUTPATIENT
Start: 2023-05-13 | End: 2023-05-13

## 2023-05-13 RX ORDER — MAGNESIUM SULFATE 500 MG/ML
2 VIAL (ML) INJECTION ONCE
Refills: 0 | Status: COMPLETED | OUTPATIENT
Start: 2023-05-13 | End: 2023-05-13

## 2023-05-13 RX ORDER — SODIUM BICARBONATE 1 MEQ/ML
100 SYRINGE (ML) INTRAVENOUS ONCE
Refills: 0 | Status: COMPLETED | OUTPATIENT
Start: 2023-05-13 | End: 2023-05-13

## 2023-05-13 RX ORDER — IPRATROPIUM/ALBUTEROL SULFATE 18-103MCG
3 AEROSOL WITH ADAPTER (GRAM) INHALATION ONCE
Refills: 0 | Status: DISCONTINUED | OUTPATIENT
Start: 2023-05-13 | End: 2023-05-13

## 2023-05-13 RX ORDER — POTASSIUM CHLORIDE 20 MEQ
20 PACKET (EA) ORAL
Refills: 0 | Status: COMPLETED | OUTPATIENT
Start: 2023-05-13 | End: 2023-05-13

## 2023-05-13 RX ORDER — NOREPINEPHRINE BITARTRATE/D5W 8 MG/250ML
0.05 PLASTIC BAG, INJECTION (ML) INTRAVENOUS
Qty: 8 | Refills: 0 | Status: DISCONTINUED | OUTPATIENT
Start: 2023-05-13 | End: 2023-05-17

## 2023-05-13 RX ORDER — CEFEPIME 1 G/1
2000 INJECTION, POWDER, FOR SOLUTION INTRAMUSCULAR; INTRAVENOUS EVERY 12 HOURS
Refills: 0 | Status: DISCONTINUED | OUTPATIENT
Start: 2023-05-13 | End: 2023-05-17

## 2023-05-13 RX ORDER — NOREPINEPHRINE BITARTRATE/D5W 8 MG/250ML
0.05 PLASTIC BAG, INJECTION (ML) INTRAVENOUS
Qty: 32 | Refills: 0 | Status: DISCONTINUED | OUTPATIENT
Start: 2023-05-13 | End: 2023-05-13

## 2023-05-13 RX ORDER — AMIODARONE HYDROCHLORIDE 400 MG/1
1 TABLET ORAL
Qty: 450 | Refills: 0 | Status: DISCONTINUED | OUTPATIENT
Start: 2023-05-14 | End: 2023-05-14

## 2023-05-13 RX ORDER — PHENYLEPHRINE HYDROCHLORIDE 10 MG/ML
0.1 INJECTION INTRAVENOUS
Qty: 40 | Refills: 0 | Status: DISCONTINUED | OUTPATIENT
Start: 2023-05-13 | End: 2023-05-13

## 2023-05-13 RX ORDER — METOPROLOL TARTRATE 50 MG
5 TABLET ORAL ONCE
Refills: 0 | Status: COMPLETED | OUTPATIENT
Start: 2023-05-13 | End: 2023-05-13

## 2023-05-13 RX ORDER — CALCIUM GLUCONATE 100 MG/ML
1 VIAL (ML) INTRAVENOUS ONCE
Refills: 0 | Status: DISCONTINUED | OUTPATIENT
Start: 2023-05-13 | End: 2023-05-13

## 2023-05-13 RX ORDER — AMIODARONE HYDROCHLORIDE 400 MG/1
150 TABLET ORAL ONCE
Refills: 0 | Status: COMPLETED | OUTPATIENT
Start: 2023-05-13 | End: 2023-05-13

## 2023-05-13 RX ORDER — CEFTRIAXONE 500 MG/1
1000 INJECTION, POWDER, FOR SOLUTION INTRAMUSCULAR; INTRAVENOUS ONCE
Refills: 0 | Status: COMPLETED | OUTPATIENT
Start: 2023-05-13 | End: 2023-05-13

## 2023-05-13 RX ORDER — SODIUM BICARBONATE 1 MEQ/ML
100 SYRINGE (ML) INTRAVENOUS ONCE
Refills: 0 | Status: DISCONTINUED | OUTPATIENT
Start: 2023-05-13 | End: 2023-05-13

## 2023-05-13 RX ORDER — SERTRALINE 25 MG/1
25 TABLET, FILM COATED ORAL DAILY
Refills: 0 | Status: DISCONTINUED | OUTPATIENT
Start: 2023-05-13 | End: 2023-05-23

## 2023-05-13 RX ORDER — PANTOPRAZOLE SODIUM 20 MG/1
40 TABLET, DELAYED RELEASE ORAL DAILY
Refills: 0 | Status: DISCONTINUED | OUTPATIENT
Start: 2023-05-13 | End: 2023-05-18

## 2023-05-13 RX ORDER — GLUCAGON INJECTION, SOLUTION 0.5 MG/.1ML
3 INJECTION, SOLUTION SUBCUTANEOUS ONCE
Refills: 0 | Status: DISCONTINUED | OUTPATIENT
Start: 2023-05-13 | End: 2023-05-13

## 2023-05-13 RX ORDER — FENTANYL CITRATE 50 UG/ML
0.5 INJECTION INTRAVENOUS
Qty: 2500 | Refills: 0 | Status: DISCONTINUED | OUTPATIENT
Start: 2023-05-13 | End: 2023-05-17

## 2023-05-13 RX ORDER — VANCOMYCIN HCL 1 G
VIAL (EA) INTRAVENOUS
Refills: 0 | Status: DISCONTINUED | OUTPATIENT
Start: 2023-05-13 | End: 2023-05-13

## 2023-05-13 RX ORDER — CEFEPIME 1 G/1
2000 INJECTION, POWDER, FOR SOLUTION INTRAMUSCULAR; INTRAVENOUS ONCE
Refills: 0 | Status: COMPLETED | OUTPATIENT
Start: 2023-05-13 | End: 2023-05-13

## 2023-05-13 RX ORDER — DEXMEDETOMIDINE HYDROCHLORIDE IN 0.9% SODIUM CHLORIDE 4 UG/ML
0.1 INJECTION INTRAVENOUS
Qty: 400 | Refills: 0 | Status: DISCONTINUED | OUTPATIENT
Start: 2023-05-13 | End: 2023-05-16

## 2023-05-13 RX ORDER — BUMETANIDE 0.25 MG/ML
2 INJECTION INTRAMUSCULAR; INTRAVENOUS
Qty: 20 | Refills: 0 | Status: DISCONTINUED | OUTPATIENT
Start: 2023-05-13 | End: 2023-05-14

## 2023-05-13 RX ORDER — METOPROLOL TARTRATE 50 MG
5 TABLET ORAL ONCE
Refills: 0 | Status: DISCONTINUED | OUTPATIENT
Start: 2023-05-13 | End: 2023-05-13

## 2023-05-13 RX ORDER — CHLORHEXIDINE GLUCONATE 213 G/1000ML
15 SOLUTION TOPICAL EVERY 12 HOURS
Refills: 0 | Status: DISCONTINUED | OUTPATIENT
Start: 2023-05-13 | End: 2023-05-15

## 2023-05-13 RX ORDER — CEFEPIME 1 G/1
INJECTION, POWDER, FOR SOLUTION INTRAMUSCULAR; INTRAVENOUS
Refills: 0 | Status: DISCONTINUED | OUTPATIENT
Start: 2023-05-13 | End: 2023-05-17

## 2023-05-13 RX ORDER — IPRATROPIUM BROMIDE 0.2 MG/ML
1 SOLUTION, NON-ORAL INHALATION ONCE
Refills: 0 | Status: DISCONTINUED | OUTPATIENT
Start: 2023-05-13 | End: 2023-05-23

## 2023-05-13 RX ORDER — SODIUM CHLORIDE 9 MG/ML
500 INJECTION, SOLUTION INTRAVENOUS ONCE
Refills: 0 | Status: COMPLETED | OUTPATIENT
Start: 2023-05-13 | End: 2023-05-13

## 2023-05-13 RX ORDER — METOPROLOL TARTRATE 50 MG
25 TABLET ORAL EVERY 8 HOURS
Refills: 0 | Status: DISCONTINUED | OUTPATIENT
Start: 2023-05-13 | End: 2023-05-13

## 2023-05-13 RX ORDER — CEFTRIAXONE 500 MG/1
INJECTION, POWDER, FOR SOLUTION INTRAMUSCULAR; INTRAVENOUS
Refills: 0 | Status: DISCONTINUED | OUTPATIENT
Start: 2023-05-13 | End: 2023-05-13

## 2023-05-13 RX ORDER — AZITHROMYCIN 500 MG/1
TABLET, FILM COATED ORAL
Refills: 0 | Status: DISCONTINUED | OUTPATIENT
Start: 2023-05-13 | End: 2023-05-13

## 2023-05-13 RX ORDER — DILTIAZEM HCL 120 MG
10 CAPSULE, EXT RELEASE 24 HR ORAL ONCE
Refills: 0 | Status: COMPLETED | OUTPATIENT
Start: 2023-05-13 | End: 2023-05-13

## 2023-05-13 RX ORDER — DOBUTAMINE HCL 250MG/20ML
5 VIAL (ML) INTRAVENOUS
Qty: 1000 | Refills: 0 | Status: DISCONTINUED | OUTPATIENT
Start: 2023-05-13 | End: 2023-05-14

## 2023-05-13 RX ORDER — ENOXAPARIN SODIUM 100 MG/ML
60 INJECTION SUBCUTANEOUS EVERY 12 HOURS
Refills: 0 | Status: DISCONTINUED | OUTPATIENT
Start: 2023-05-13 | End: 2023-05-15

## 2023-05-13 RX ORDER — POTASSIUM CHLORIDE 20 MEQ
20 PACKET (EA) ORAL
Refills: 0 | Status: DISCONTINUED | OUTPATIENT
Start: 2023-05-13 | End: 2023-05-13

## 2023-05-13 RX ORDER — CEFEPIME 1 G/1
INJECTION, POWDER, FOR SOLUTION INTRAMUSCULAR; INTRAVENOUS
Refills: 0 | Status: DISCONTINUED | OUTPATIENT
Start: 2023-05-13 | End: 2023-05-13

## 2023-05-13 RX ORDER — IPRATROPIUM BROMIDE 0.2 MG/ML
500 SOLUTION, NON-ORAL INHALATION ONCE
Refills: 0 | Status: DISCONTINUED | OUTPATIENT
Start: 2023-05-13 | End: 2023-05-23

## 2023-05-13 RX ORDER — AZITHROMYCIN 500 MG/1
500 TABLET, FILM COATED ORAL ONCE
Refills: 0 | Status: COMPLETED | OUTPATIENT
Start: 2023-05-13 | End: 2023-05-13

## 2023-05-13 RX ORDER — MIDAZOLAM HYDROCHLORIDE 1 MG/ML
4 INJECTION, SOLUTION INTRAMUSCULAR; INTRAVENOUS ONCE
Refills: 0 | Status: DISCONTINUED | OUTPATIENT
Start: 2023-05-13 | End: 2023-05-13

## 2023-05-13 RX ORDER — BUMETANIDE 0.25 MG/ML
2 INJECTION INTRAMUSCULAR; INTRAVENOUS ONCE
Refills: 0 | Status: COMPLETED | OUTPATIENT
Start: 2023-05-13 | End: 2023-05-13

## 2023-05-13 RX ORDER — AMIODARONE HYDROCHLORIDE 400 MG/1
0.5 TABLET ORAL
Qty: 450 | Refills: 0 | Status: DISCONTINUED | OUTPATIENT
Start: 2023-05-14 | End: 2023-05-17

## 2023-05-13 RX ORDER — VANCOMYCIN HCL 1 G
750 VIAL (EA) INTRAVENOUS EVERY 24 HOURS
Refills: 0 | Status: DISCONTINUED | OUTPATIENT
Start: 2023-05-13 | End: 2023-05-14

## 2023-05-13 RX ADMIN — Medication 2.13 MICROGRAM(S)/KG/MIN: at 04:41

## 2023-05-13 RX ADMIN — Medication 5 MILLIGRAM(S): at 01:40

## 2023-05-13 RX ADMIN — FENTANYL CITRATE 2.84 MICROGRAM(S)/KG/HR: 50 INJECTION INTRAVENOUS at 04:40

## 2023-05-13 RX ADMIN — CHLORHEXIDINE GLUCONATE 15 MILLILITER(S): 213 SOLUTION TOPICAL at 08:50

## 2023-05-13 RX ADMIN — Medication 50 MILLIEQUIVALENT(S): at 20:39

## 2023-05-13 RX ADMIN — Medication 50 MILLIEQUIVALENT(S): at 23:14

## 2023-05-13 RX ADMIN — CEFTRIAXONE 100 MILLIGRAM(S): 500 INJECTION, POWDER, FOR SOLUTION INTRAMUSCULAR; INTRAVENOUS at 01:48

## 2023-05-13 RX ADMIN — DEXMEDETOMIDINE HYDROCHLORIDE IN 0.9% SODIUM CHLORIDE 1.42 MICROGRAM(S)/KG/HR: 4 INJECTION INTRAVENOUS at 04:39

## 2023-05-13 RX ADMIN — BUMETANIDE 10 MG/HR: 0.25 INJECTION INTRAMUSCULAR; INTRAVENOUS at 05:52

## 2023-05-13 RX ADMIN — CEFEPIME 100 MILLIGRAM(S): 1 INJECTION, POWDER, FOR SOLUTION INTRAMUSCULAR; INTRAVENOUS at 04:18

## 2023-05-13 RX ADMIN — ENOXAPARIN SODIUM 40 MILLIGRAM(S): 100 INJECTION SUBCUTANEOUS at 08:50

## 2023-05-13 RX ADMIN — Medication 5 MILLIGRAM(S): at 01:49

## 2023-05-13 RX ADMIN — Medication 50 MILLIEQUIVALENT(S): at 22:20

## 2023-05-13 RX ADMIN — AMIODARONE HYDROCHLORIDE 600 MILLIGRAM(S): 400 TABLET ORAL at 23:14

## 2023-05-13 RX ADMIN — DEXMEDETOMIDINE HYDROCHLORIDE IN 0.9% SODIUM CHLORIDE 1.42 MICROGRAM(S)/KG/HR: 4 INJECTION INTRAVENOUS at 12:48

## 2023-05-13 RX ADMIN — Medication 25 GRAM(S): at 08:50

## 2023-05-13 RX ADMIN — AZITHROMYCIN 255 MILLIGRAM(S): 500 TABLET, FILM COATED ORAL at 01:49

## 2023-05-13 RX ADMIN — ENOXAPARIN SODIUM 60 MILLIGRAM(S): 100 INJECTION SUBCUTANEOUS at 18:03

## 2023-05-13 RX ADMIN — SODIUM CHLORIDE 1000 MILLILITER(S): 9 INJECTION, SOLUTION INTRAVENOUS at 03:00

## 2023-05-13 RX ADMIN — BUMETANIDE 10 MG/HR: 0.25 INJECTION INTRAMUSCULAR; INTRAVENOUS at 14:53

## 2023-05-13 RX ADMIN — MIDAZOLAM HYDROCHLORIDE 4 MILLIGRAM(S): 1 INJECTION, SOLUTION INTRAMUSCULAR; INTRAVENOUS at 04:17

## 2023-05-13 RX ADMIN — CEFEPIME 100 MILLIGRAM(S): 1 INJECTION, POWDER, FOR SOLUTION INTRAMUSCULAR; INTRAVENOUS at 18:04

## 2023-05-13 RX ADMIN — BUMETANIDE 10 MG/HR: 0.25 INJECTION INTRAMUSCULAR; INTRAVENOUS at 22:54

## 2023-05-13 RX ADMIN — Medication 10 MILLIGRAM(S): at 02:37

## 2023-05-13 RX ADMIN — Medication 5.63 MICROGRAM(S)/KG/MIN: at 14:48

## 2023-05-13 RX ADMIN — Medication 250 MILLIGRAM(S): at 04:18

## 2023-05-13 RX ADMIN — FENTANYL CITRATE 2.84 MICROGRAM(S)/KG/HR: 50 INJECTION INTRAVENOUS at 22:33

## 2023-05-13 RX ADMIN — CHLORHEXIDINE GLUCONATE 15 MILLILITER(S): 213 SOLUTION TOPICAL at 18:04

## 2023-05-13 RX ADMIN — BUMETANIDE 2 MILLIGRAM(S): 0.25 INJECTION INTRAMUSCULAR; INTRAVENOUS at 04:42

## 2023-05-13 RX ADMIN — Medication 100 MILLIEQUIVALENT(S): at 04:40

## 2023-05-13 RX ADMIN — PANTOPRAZOLE SODIUM 40 MILLIGRAM(S): 20 TABLET, DELAYED RELEASE ORAL at 13:04

## 2023-05-13 NOTE — CHART NOTE - NSCHARTNOTEFT_GEN_A_CORE
Transfer Note    Transfer from:   Transfer to:   Accepting physican:    HPI:    COURSE:    VITAL SIGNS: Last 24 Hours  T(C): 36.5 (12 May 2023 23:34), Max: 36.7 (12 May 2023 14:45)  T(F): 97.7 (12 May 2023 23:34), Max: 98 (12 May 2023 14:45)  HR: 101 (13 May 2023 03:10) (94 - 166)  BP: 105/56 (13 May 2023 03:10) (90/70 - 162/67)  BP(mean): 79 (12 May 2023 23:34) (79 - 79)  RR: 19 (12 May 2023 15:30) (16 - 20)  SpO2: 99% (13 May 2023 03:10) (94% - 99%)    MEDICATIONS:  STANDING MEDICATIONS  buMETAnide Infusion 2 mG/Hr IV Continuous <Continuous>  calcium gluconate IVPB 1 Gram(s) IV Intermittent once  cefepime   IVPB      cefepime   IVPB 2000 milliGRAM(s) IV Intermittent every 12 hours  chlorhexidine 0.12% Liquid 15 milliLiter(s) Oral Mucosa every 12 hours  dexMEDEtomidine Infusion 0.1 MICROgram(s)/kG/Hr IV Continuous <Continuous>  DOBUTamine Infusion 2.5 MICROgram(s)/kG/Min IV Continuous <Continuous>  enoxaparin Injectable 40 milliGRAM(s) SubCutaneous every 24 hours  fentaNYL   Infusion. 0.5 MICROgram(s)/kG/Hr IV Continuous <Continuous>  glucagon  Injectable 3 milliGRAM(s) IV Push once  ipratropium  for Nebulization. 500 MICROGram(s) Nebulizer once  ipratropium 17 MICROgram(s) HFA Inhaler 1 Puff(s) Inhalation once  norepinephrine Infusion 0.05 MICROgram(s)/kG/Min IV Continuous <Continuous>  sertraline 25 milliGRAM(s) Oral daily  vancomycin  IVPB 750 milliGRAM(s) IV Intermittent every 24 hours    PRN MEDICATIONS      VITAL SIGNS: Last 24 Hours  T(C): 36.5 (12 May 2023 23:34), Max: 36.7 (12 May 2023 14:45)  T(F): 97.7 (12 May 2023 23:34), Max: 98 (12 May 2023 14:45)  HR: 101 (13 May 2023 03:10) (94 - 166)  BP: 105/56 (13 May 2023 03:10) (90/70 - 162/67)  BP(mean): 79 (12 May 2023 23:34) (79 - 79)  RR: 19 (12 May 2023 15:30) (16 - 20)  SpO2: 99% (13 May 2023 03:10) (94% - 99%)    LABS:                        13.4   9.75  )-----------( 330      ( 13 May 2023 02:56 )             43.3     05-13    137  |  105  |  12  ----------------------------<  241<H>  4.7   |  14<L>  |  1.1    Ca    9.1      13 May 2023 02:56  Phos  4.8     05-13  Mg     2.0     05-13    TPro  6.3  /  Alb  3.5  /  TBili  0.5  /  DBili  x   /  AST  54<H>  /  ALT  54<H>  /  AlkPhos  128<H>  05-13    ABG - ( 13 May 2023 02:47 )  pH, Arterial: 7.18  pH, Blood: x     /  pCO2: 46    /  pO2: 61    / HCO3: 17    / Base Excess: -11.0 /  SaO2: 86.9        CARDIAC MARKERS ( 13 May 2023 02:56 )  x     / <0.01 ng/mL / x     / x     / x      CARDIAC MARKERS ( 12 May 2023 16:10 )  x     / <0.01 ng/mL / x     / x     / x          RADIOLOGY:  < from: Xray Chest 1 View- PORTABLE-Urgent (05.12.23 @ 16:06) >  Impression: Cardiomegaly, bilateral opacities/pleural effusions, left upper lobe bulla.      ASSESSMENT & PLAN:     For Follow-Up: Transfer Note    Transfer from: Med tele  Transfer to: CCU    HPI: 78yo F hx of NSCLC stage 3 s/p CRT and surgery 2018, emphysema, severe mitral valve regurgitation s/p MVR, paroxysmal atrial fibrillation previously on coumadin but stopped after MVR and KOBY closure, former smoker presenting with progressive shortness of breath on exertion, and intermittent dry cough lasting for past 2 weeks. Denies chest pain, palpitations, headaches, wheezing, abdominal pain, fevers, sick contacts, nausea/vomiting. Was prescribed meds from PMD without symptomatic relief. Presented to the ED from home.    At the ED, VS- T: 36.7C, BP: 90/70, HR: 160s, SpO2: 99% on RA, RR: 16. Labs significant for BNP- 6227, and elevated AST/ALT (54/60). Troponin<0.01 x1. EKG showed atrial fibrillation with RVR. CT Chest NC showed unchanged RLL GGO.     Admitted to telemetry for afib with RVR. Given 60mg x1 therapeutic lovenox. 1L LR bolus. For afib with  RVR, was given 20mg IVP diltiazem and 60mg PO. HR improved to 94.     Course: In first hours after admission, rapid response called for altered mental status with hypotension    Patient was very lethargic, but responsive to verbal stimuli and able to answer questions and follow commands. Trace wheezing on auscultation. Atrovent given. Irregularly irregular rhythm. Initial vital signs: BP unable to obtained accurate measurement due to suspected hypotension, HR ~, SpO2 98% on NRB. IVF NS bolus started. ABG showed pH 7.18, pCO2 46, pO2 61, lactate 7.0. Repeat BP after fluids started measured at 90/50.  Stat labs drawn: CBC, CMP, Mg, Phos, Coag, Troponin, Blood cultures.    Bedside Echo showed severely decreased LVEF with dilated IVC. At that point, IVF discontinued (patient received 500 cc IVF total during RRT).   Spoke with Cardiac fellow who recommends to started on dobutamine infusion for suspected cardiogenic shock and levophed to keep MAP >65.     TLC placed at left IJ.  Intubated, Nebulizer treatment  EKG, IV fluid bolus  - Total of 500cc IV fluid given  - NaHCO3 50mEq x 2   - Vancomycin, cefepime   - Dobutamine and Levophed.   - Bumex 2mg x 1, then bumex drip   - Precedex and fentanyl for sedation.  - D-dimer 753.     CT head non-contrast: wet read: no large intracranial bleed  CTA chest: wet read: no saddle or segmental emboli    Arterial line placed at R radial artery.        VITAL SIGNS: Last 24 Hours  T(C): 36.5 (12 May 2023 23:34), Max: 36.7 (12 May 2023 14:45)  T(F): 97.7 (12 May 2023 23:34), Max: 98 (12 May 2023 14:45)  HR: 101 (13 May 2023 03:10) (94 - 166)  BP: 105/56 (13 May 2023 03:10) (90/70 - 162/67)  BP(mean): 79 (12 May 2023 23:34) (79 - 79)  RR: 19 (12 May 2023 15:30) (16 - 20)  SpO2: 99% (13 May 2023 03:10) (94% - 99%)    MEDICATIONS:  STANDING MEDICATIONS  buMETAnide Infusion 2 mG/Hr IV Continuous <Continuous>  calcium gluconate IVPB 1 Gram(s) IV Intermittent once  cefepime   IVPB      cefepime   IVPB 2000 milliGRAM(s) IV Intermittent every 12 hours  chlorhexidine 0.12% Liquid 15 milliLiter(s) Oral Mucosa every 12 hours  dexMEDEtomidine Infusion 0.1 MICROgram(s)/kG/Hr IV Continuous <Continuous>  DOBUTamine Infusion 2.5 MICROgram(s)/kG/Min IV Continuous <Continuous>  enoxaparin Injectable 40 milliGRAM(s) SubCutaneous every 24 hours  fentaNYL   Infusion. 0.5 MICROgram(s)/kG/Hr IV Continuous <Continuous>  glucagon  Injectable 3 milliGRAM(s) IV Push once  ipratropium  for Nebulization. 500 MICROGram(s) Nebulizer once  ipratropium 17 MICROgram(s) HFA Inhaler 1 Puff(s) Inhalation once  norepinephrine Infusion 0.05 MICROgram(s)/kG/Min IV Continuous <Continuous>  sertraline 25 milliGRAM(s) Oral daily  vancomycin  IVPB 750 milliGRAM(s) IV Intermittent every 24 hours    PRN MEDICATIONS      VITAL SIGNS: Last 24 Hours  T(C): 36.5 (12 May 2023 23:34), Max: 36.7 (12 May 2023 14:45)  T(F): 97.7 (12 May 2023 23:34), Max: 98 (12 May 2023 14:45)  HR: 101 (13 May 2023 03:10) (94 - 166)  BP: 105/56 (13 May 2023 03:10) (90/70 - 162/67)  BP(mean): 79 (12 May 2023 23:34) (79 - 79)  RR: 19 (12 May 2023 15:30) (16 - 20)  SpO2: 99% (13 May 2023 03:10) (94% - 99%)    LABS:                        13.4   9.75  )-----------( 330      ( 13 May 2023 02:56 )             43.3     05-13    137  |  105  |  12  ----------------------------<  241<H>  4.7   |  14<L>  |  1.1    Ca    9.1      13 May 2023 02:56  Phos  4.8     05-13  Mg     2.0     05-13    TPro  6.3  /  Alb  3.5  /  TBili  0.5  /  DBili  x   /  AST  54<H>  /  ALT  54<H>  /  AlkPhos  128<H>  05-13    ABG - ( 13 May 2023 02:47 )  pH, Arterial: 7.18  pH, Blood: x     /  pCO2: 46    /  pO2: 61    / HCO3: 17    / Base Excess: -11.0 /  SaO2: 86.9        CARDIAC MARKERS ( 13 May 2023 02:56 )  x     / <0.01 ng/mL / x     / x     / x      CARDIAC MARKERS ( 12 May 2023 16:10 )  x     / <0.01 ng/mL / x     / x     / x          RADIOLOGY:  < from: Xray Chest 1 View- PORTABLE-Urgent (05.12.23 @ 16:06) >  Impression: Cardiomegaly, bilateral opacities/pleural effusions, left upper lobe bulla.      ASSESSMENT & PLAN:     78yo F hx of NSCLC stage 3 s/p CRT and surgery 2018, emphysema, severe mitral valve regurgitation s/p MVR, paroxysmal atrial fibrillation previously on coumadin but stopped after MVR and KOBY closure, former smoker presenting with progressive shortness of breath on exertion, and intermittent dry cough lasting for past 2 weeks.    #Paroxysmal Atrial fibrillation with RVR  #Hx of severe MVR s/p MVR  #s/p KOBY closure  #Elevated BNP  - euvolemic on exam. on RA. holding diuretics  - cont telemetry  - check EKG in AM  - check TSH  - TTE  - increase metoprolol to 75mg QD (metoprolol tartrate 25mg TID)  - HR currently in 120s. give IV 5mg metoprolol now x1  - trend trop  - cardio consult with EP (previously was seeing Dr. Martinez) for HR control and AC recs    #LLL opacity/pleural effusion? vs PNA?  - Chest Xray findings noted  - CT Chest with IV contrast for evaluation  - check procal  - azithromycin + ceftriaxone for now    #transaminitis  - RUQ US  - sertraline rarely causes transaminitis    #Hx of anxiety  - cont sertraline 25    #Hx of emphysema  #Hx of NSCLC s/p CRT and surgery    #DVT ppx: prophylactic DVT ppx for now  #GI ppx: none  #Diet: DASH/TLC  #Code: full  #Activity: IAT  #Dispo: acute. cont telemetry      For Follow-Up:  - Pull back R IJ TLC by 1 cm Transfer Note    Transfer from: Med tele  Transfer to: CCU    HPI: 78yo F hx of NSCLC stage 3 s/p CRT and surgery 2018, emphysema, severe mitral valve regurgitation s/p MVR, paroxysmal atrial fibrillation previously on coumadin but stopped after MVR and KOBY closure, former smoker presenting with progressive shortness of breath on exertion, and intermittent dry cough lasting for past 2 weeks. Denies chest pain, palpitations, headaches, wheezing, abdominal pain, fevers, sick contacts, nausea/vomiting. Was prescribed meds from PMD without symptomatic relief. Presented to the ED from home.    At the ED, VS- T: 36.7C, BP: 90/70, HR: 160s, SpO2: 99% on RA, RR: 16. Labs significant for BNP- 6227, and elevated AST/ALT (54/60). Troponin<0.01 x1. EKG showed atrial fibrillation with RVR. CT Chest NC showed unchanged RLL GGO.     Admitted to telemetry for afib with RVR. Given 60mg x1 therapeutic lovenox. 1L LR bolus. For afib with  RVR, was given 20mg IVP diltiazem and 60mg PO. HR improved to 94.     Course: In first hours after admission, rapid response called for altered mental status with hypotension    Patient was very lethargic, but responsive to verbal stimuli and able to answer questions and follow commands. Trace wheezing on auscultation. Atrovent given. Irregularly irregular rhythm. Initial vital signs: BP unable to obtained accurate measurement due to suspected hypotension, HR ~, SpO2 98% on NRB. IVF NS bolus started. ABG showed pH 7.18, pCO2 46, pO2 61, lactate 7.0. Repeat BP after fluids started measured at 90/50.  Stat labs drawn: CBC, CMP, Mg, Phos, Coag, Troponin, Blood cultures.    Bedside Echo showed severely decreased LVEF with dilated IVC. At that point, IVF discontinued (patient received 500 cc IVF total during RRT).   Spoke with Cardiac fellow who recommends to started on dobutamine infusion for suspected cardiogenic shock and levophed to keep MAP >65.     TLC placed at left IJ.  Intubated, Nebulizer treatment  EKG, IV fluid bolus  - Total of 500cc IV fluid given  - NaHCO3 50mEq x 2   - Vancomycin, cefepime   - Dobutamine and Levophed.   - Bumex 2mg x 1, then bumex drip   - Precedex and fentanyl for sedation.  - D-dimer 753.     CT head non-contrast: wet read: no large intracranial bleed  CTA chest: wet read: no saddle or segmental emboli. B/l large pleural effusions.    Arterial line placed at R radial artery.        VITAL SIGNS: Last 24 Hours  T(C): 36.5 (12 May 2023 23:34), Max: 36.7 (12 May 2023 14:45)  T(F): 97.7 (12 May 2023 23:34), Max: 98 (12 May 2023 14:45)  HR: 101 (13 May 2023 03:10) (94 - 166)  BP: 105/56 (13 May 2023 03:10) (90/70 - 162/67)  BP(mean): 79 (12 May 2023 23:34) (79 - 79)  RR: 19 (12 May 2023 15:30) (16 - 20)  SpO2: 99% (13 May 2023 03:10) (94% - 99%)    MEDICATIONS:  STANDING MEDICATIONS  buMETAnide Infusion 2 mG/Hr IV Continuous <Continuous>  calcium gluconate IVPB 1 Gram(s) IV Intermittent once  cefepime   IVPB      cefepime   IVPB 2000 milliGRAM(s) IV Intermittent every 12 hours  chlorhexidine 0.12% Liquid 15 milliLiter(s) Oral Mucosa every 12 hours  dexMEDEtomidine Infusion 0.1 MICROgram(s)/kG/Hr IV Continuous <Continuous>  DOBUTamine Infusion 2.5 MICROgram(s)/kG/Min IV Continuous <Continuous>  enoxaparin Injectable 40 milliGRAM(s) SubCutaneous every 24 hours  fentaNYL   Infusion. 0.5 MICROgram(s)/kG/Hr IV Continuous <Continuous>  glucagon  Injectable 3 milliGRAM(s) IV Push once  ipratropium  for Nebulization. 500 MICROGram(s) Nebulizer once  ipratropium 17 MICROgram(s) HFA Inhaler 1 Puff(s) Inhalation once  norepinephrine Infusion 0.05 MICROgram(s)/kG/Min IV Continuous <Continuous>  sertraline 25 milliGRAM(s) Oral daily  vancomycin  IVPB 750 milliGRAM(s) IV Intermittent every 24 hours    PRN MEDICATIONS      VITAL SIGNS: Last 24 Hours  T(C): 36.5 (12 May 2023 23:34), Max: 36.7 (12 May 2023 14:45)  T(F): 97.7 (12 May 2023 23:34), Max: 98 (12 May 2023 14:45)  HR: 101 (13 May 2023 03:10) (94 - 166)  BP: 105/56 (13 May 2023 03:10) (90/70 - 162/67)  BP(mean): 79 (12 May 2023 23:34) (79 - 79)  RR: 19 (12 May 2023 15:30) (16 - 20)  SpO2: 99% (13 May 2023 03:10) (94% - 99%)    LABS:                        13.4   9.75  )-----------( 330      ( 13 May 2023 02:56 )             43.3     05-13    137  |  105  |  12  ----------------------------<  241<H>  4.7   |  14<L>  |  1.1    Ca    9.1      13 May 2023 02:56  Phos  4.8     05-13  Mg     2.0     05-13    TPro  6.3  /  Alb  3.5  /  TBili  0.5  /  DBili  x   /  AST  54<H>  /  ALT  54<H>  /  AlkPhos  128<H>  05-13    ABG - ( 13 May 2023 02:47 )  pH, Arterial: 7.18  pH, Blood: x     /  pCO2: 46    /  pO2: 61    / HCO3: 17    / Base Excess: -11.0 /  SaO2: 86.9        CARDIAC MARKERS ( 13 May 2023 02:56 )  x     / <0.01 ng/mL / x     / x     / x      CARDIAC MARKERS ( 12 May 2023 16:10 )  x     / <0.01 ng/mL / x     / x     / x          RADIOLOGY:  < from: Xray Chest 1 View- PORTABLE-Urgent (05.12.23 @ 16:06) >  Impression: Cardiomegaly, bilateral opacities/pleural effusions, left upper lobe bulla.      ASSESSMENT & PLAN:     78yo F hx of NSCLC stage 3 s/p CRT and surgery 2018, emphysema, severe mitral valve regurgitation s/p MVR, paroxysmal atrial fibrillation previously on coumadin but stopped after MVR and KOBY closure, former smoker presenting with progressive shortness of breath on exertion, and intermittent dry cough lasting for past 2 weeks.    #Paroxysmal Atrial fibrillation with RVR  #Hx of severe MVR s/p MVR  #s/p KOBY closure  #Elevated BNP  - euvolemic on exam. on RA. holding diuretics  - cont telemetry  - check EKG in AM  - check TSH  - TTE  - increase metoprolol to 75mg QD (metoprolol tartrate 25mg TID)  - HR currently in 120s. give IV 5mg metoprolol now x1  - trend trop  - cardio consult with EP (previously was seeing Dr. Martinez) for HR control and AC recs    #LLL opacity/pleural effusion? vs PNA?  - Chest Xray findings noted  - CT Chest with IV contrast for evaluation  - check procal  - azithromycin + ceftriaxone for now    #transaminitis  - RUQ US  - sertraline rarely causes transaminitis    #Hx of anxiety  - cont sertraline 25    #Hx of emphysema  #Hx of NSCLC s/p CRT and surgery    #DVT ppx: prophylactic DVT ppx for now  #GI ppx: none  #Diet: DASH/TLC  #Code: full  #Activity: IAT  #Dispo: acute. cont telemetry      For Follow-Up:  - Pull back R IJ TLC by 1 cm Transfer Note    Transfer from: Med tele  Transfer to: CCU    HPI: 78yo F hx of NSCLC stage 3 s/p CRT and surgery 2018, emphysema, severe mitral valve regurgitation s/p MVR, paroxysmal atrial fibrillation previously on coumadin but stopped after MVR and KOBY closure, former smoker presenting with progressive shortness of breath on exertion, and intermittent dry cough lasting for past 2 weeks. Denies chest pain, palpitations, headaches, wheezing, abdominal pain, fevers, sick contacts, nausea/vomiting. Was prescribed meds from PMD without symptomatic relief. Presented to the ED from home.    At the ED, VS- T: 36.7C, BP: 90/70, HR: 160s, SpO2: 99% on RA, RR: 16. Labs significant for BNP- 6227, and elevated AST/ALT (54/60). Troponin<0.01 x1. EKG showed atrial fibrillation with RVR. CT Chest NC showed unchanged RLL GGO.     Admitted to telemetry for afib with RVR. Given 60mg x1 therapeutic lovenox. 1L LR bolus. For afib with  RVR, was given 20mg IVP diltiazem and 60mg PO. HR improved to 94.     Course: In first hours after admission, rapid response called for altered mental status with hypotension    Patient was very lethargic, but responsive to verbal stimuli and able to answer questions and follow commands. Trace wheezing on auscultation. Atrovent given. Irregularly irregular rhythm. Initial vital signs: BP unable to obtained accurate measurement due to suspected hypotension, HR ~, SpO2 98% on NRB. IVF NS bolus started. ABG showed pH 7.18, pCO2 46, pO2 61, lactate 7.0. Repeat BP after fluids started measured at 90/50.  Stat labs drawn: CBC, CMP, Mg, Phos, Coag, Troponin, Blood cultures.    Bedside Echo showed severely decreased LVEF with dilated IVC. At that point, IVF discontinued (patient received 500 cc IVF total during RRT).   Spoke with Cardiac fellow who recommends to started on dobutamine infusion for suspected cardiogenic shock and levophed to keep MAP >65.     TLC placed at left IJ.  Intubated, Nebulizer treatment  EKG, IV fluid bolus  - Total of 500cc IV fluid given  - NaHCO3 50mEq x 2   - Vancomycin, cefepime   - Dobutamine and Levophed.   - Bumex 2mg x 1, then bumex drip   - Precedex and fentanyl for sedation.  - D-dimer 753.     CT head non-contrast: wet read: no large intracranial bleed  CTA chest: wet read: no saddle or segmental emboli. B/l large pleural effusions.    Arterial line placed at R radial artery.        VITAL SIGNS: Last 24 Hours  T(C): 36.5 (12 May 2023 23:34), Max: 36.7 (12 May 2023 14:45)  T(F): 97.7 (12 May 2023 23:34), Max: 98 (12 May 2023 14:45)  HR: 101 (13 May 2023 03:10) (94 - 166)  BP: 105/56 (13 May 2023 03:10) (90/70 - 162/67)  BP(mean): 79 (12 May 2023 23:34) (79 - 79)  RR: 19 (12 May 2023 15:30) (16 - 20)  SpO2: 99% (13 May 2023 03:10) (94% - 99%)    MEDICATIONS:  STANDING MEDICATIONS  buMETAnide Infusion 2 mG/Hr IV Continuous <Continuous>  calcium gluconate IVPB 1 Gram(s) IV Intermittent once  cefepime   IVPB      cefepime   IVPB 2000 milliGRAM(s) IV Intermittent every 12 hours  chlorhexidine 0.12% Liquid 15 milliLiter(s) Oral Mucosa every 12 hours  dexMEDEtomidine Infusion 0.1 MICROgram(s)/kG/Hr IV Continuous <Continuous>  DOBUTamine Infusion 2.5 MICROgram(s)/kG/Min IV Continuous <Continuous>  enoxaparin Injectable 40 milliGRAM(s) SubCutaneous every 24 hours  fentaNYL   Infusion. 0.5 MICROgram(s)/kG/Hr IV Continuous <Continuous>  glucagon  Injectable 3 milliGRAM(s) IV Push once  ipratropium  for Nebulization. 500 MICROGram(s) Nebulizer once  ipratropium 17 MICROgram(s) HFA Inhaler 1 Puff(s) Inhalation once  norepinephrine Infusion 0.05 MICROgram(s)/kG/Min IV Continuous <Continuous>  sertraline 25 milliGRAM(s) Oral daily  vancomycin  IVPB 750 milliGRAM(s) IV Intermittent every 24 hours    PRN MEDICATIONS      VITAL SIGNS: Last 24 Hours  T(C): 36.5 (12 May 2023 23:34), Max: 36.7 (12 May 2023 14:45)  T(F): 97.7 (12 May 2023 23:34), Max: 98 (12 May 2023 14:45)  HR: 101 (13 May 2023 03:10) (94 - 166)  BP: 105/56 (13 May 2023 03:10) (90/70 - 162/67)  BP(mean): 79 (12 May 2023 23:34) (79 - 79)  RR: 19 (12 May 2023 15:30) (16 - 20)  SpO2: 99% (13 May 2023 03:10) (94% - 99%)    LABS:                        13.4   9.75  )-----------( 330      ( 13 May 2023 02:56 )             43.3     05-13    137  |  105  |  12  ----------------------------<  241<H>  4.7   |  14<L>  |  1.1    Ca    9.1      13 May 2023 02:56  Phos  4.8     05-13  Mg     2.0     05-13    TPro  6.3  /  Alb  3.5  /  TBili  0.5  /  DBili  x   /  AST  54<H>  /  ALT  54<H>  /  AlkPhos  128<H>  05-13    ABG - ( 13 May 2023 02:47 )  pH, Arterial: 7.18  pH, Blood: x     /  pCO2: 46    /  pO2: 61    / HCO3: 17    / Base Excess: -11.0 /  SaO2: 86.9        CARDIAC MARKERS ( 13 May 2023 02:56 )  x     / <0.01 ng/mL / x     / x     / x      CARDIAC MARKERS ( 12 May 2023 16:10 )  x     / <0.01 ng/mL / x     / x     / x          RADIOLOGY:  < from: Xray Chest 1 View- PORTABLE-Urgent (05.12.23 @ 16:06) >  Impression: Cardiomegaly, bilateral opacities/pleural effusions, left upper lobe bulla.      ASSESSMENT & PLAN:     78yo F hx of NSCLC stage 3 s/p CRT and surgery 2018, emphysema, severe mitral valve regurgitation s/p MVR, paroxysmal atrial fibrillation previously on coumadin but stopped after MVR and KOBY closure, former smoker presenting with progressive shortness of breath on exertion, and intermittent dry cough lasting for past 2 weeks.    #Cardiogenic shock  - Bedside echo showed severe systolic dysfunction  - In setting of recent cardizem and lopressor given in ED for Afib RVR  -   - On dobutamine infusion  - Intubated for respiratory support and airway protection    #Paroxysmal Atrial fibrillation with RVR  #Hx of severe MVR s/p MVR  #s/p KOBY closure  #Elevated BNP  - euvolemic on exam. on RA. holding diuretics  - cont telemetry  - check EKG in AM  - check TSH  - TTE  - increase metoprolol to 75mg QD (metoprolol tartrate 25mg TID)  - HR currently in 120s. give IV 5mg metoprolol now x1  - trend trop  - cardio consult with EP (previously was seeing Dr. Martinez) for HR control and AC recs    #LLL opacity/pleural effusion? vs PNA?  - Chest Xray findings noted  - CT Chest with IV contrast for evaluation  - check procal  - azithromycin + ceftriaxone for now    #transaminitis  - RUQ US  - sertraline rarely causes transaminitis    #Hx of anxiety  - cont sertraline 25    #Hx of emphysema  #Hx of NSCLC s/p CRT and surgery    #DVT ppx: prophylactic DVT ppx for now  #GI ppx: none  #Diet: DASH/TLC  #Code: full  #Activity: IAT  #Dispo: acute. cont telemetry      For Follow-Up:  - Pull back R IJ TLC by 1 cm Transfer Note    Transfer from: Med tele  Transfer to: CCU    HPI: 76yo F hx of NSCLC stage 3 s/p CRT and surgery 2018, emphysema, severe mitral valve regurgitation s/p MVR, paroxysmal atrial fibrillation previously on coumadin but stopped after MVR and KOBY closure, former smoker presenting with progressive shortness of breath on exertion, and intermittent dry cough lasting for past 2 weeks. Denies chest pain, palpitations, headaches, wheezing, abdominal pain, fevers, sick contacts, nausea/vomiting. Was prescribed meds from PMD without symptomatic relief. Presented to the ED from home.    At the ED, VS- T: 36.7C, BP: 90/70, HR: 160s, SpO2: 99% on RA, RR: 16. Labs significant for BNP- 6227, and elevated AST/ALT (54/60). Troponin<0.01 x1. EKG showed atrial fibrillation with RVR. CT Chest NC showed unchanged RLL GGO.     Admitted to telemetry for afib with RVR. Given 60mg x1 therapeutic lovenox. 1L LR bolus. For afib with  RVR, was given 20mg IVP diltiazem and 60mg PO. HR improved to 94.     Course: In first hours after admission, rapid response called for altered mental status with hypotension    Patient was very lethargic, but responsive to verbal stimuli and able to answer questions and follow commands. Trace wheezing on auscultation. Atrovent given. Irregularly irregular rhythm. Initial vital signs: BP unable to obtained accurate measurement due to suspected hypotension, HR ~, SpO2 98% on NRB. IVF NS bolus started. ABG showed pH 7.18, pCO2 46, pO2 61, lactate 7.0. Repeat BP after fluids started measured at 90/50.  Stat labs drawn: CBC, CMP, Mg, Phos, Coag, Troponin, Blood cultures.    Bedside Echo showed severely decreased LVEF with dilated IVC. At that point, IVF discontinued (patient received 500 cc IVF total during RRT).   Spoke with Cardiac fellow who recommends to started on dobutamine infusion for suspected cardiogenic shock and levophed to keep MAP >65.     TLC placed at left IJ.  Intubated, Nebulizer treatment  EKG, IV fluid bolus  - Total of 500cc IV fluid given  - NaHCO3 50mEq x 2   - Vancomycin, cefepime   - Dobutamine and Levophed.   - Bumex 2mg x 1, then bumex drip   - Precedex and fentanyl for sedation.  - D-dimer 753.     CT head non-contrast: wet read: no large intracranial bleed  CTA chest: wet read: no saddle or segmental emboli. B/l large pleural effusions.    Arterial line placed at R radial artery.        VITAL SIGNS: Last 24 Hours  T(C): 36.5 (12 May 2023 23:34), Max: 36.7 (12 May 2023 14:45)  T(F): 97.7 (12 May 2023 23:34), Max: 98 (12 May 2023 14:45)  HR: 101 (13 May 2023 03:10) (94 - 166)  BP: 105/56 (13 May 2023 03:10) (90/70 - 162/67)  BP(mean): 79 (12 May 2023 23:34) (79 - 79)  RR: 19 (12 May 2023 15:30) (16 - 20)  SpO2: 99% (13 May 2023 03:10) (94% - 99%)    MEDICATIONS:  STANDING MEDICATIONS  buMETAnide Infusion 2 mG/Hr IV Continuous <Continuous>  calcium gluconate IVPB 1 Gram(s) IV Intermittent once  cefepime   IVPB      cefepime   IVPB 2000 milliGRAM(s) IV Intermittent every 12 hours  chlorhexidine 0.12% Liquid 15 milliLiter(s) Oral Mucosa every 12 hours  dexMEDEtomidine Infusion 0.1 MICROgram(s)/kG/Hr IV Continuous <Continuous>  DOBUTamine Infusion 2.5 MICROgram(s)/kG/Min IV Continuous <Continuous>  enoxaparin Injectable 40 milliGRAM(s) SubCutaneous every 24 hours  fentaNYL   Infusion. 0.5 MICROgram(s)/kG/Hr IV Continuous <Continuous>  glucagon  Injectable 3 milliGRAM(s) IV Push once  ipratropium  for Nebulization. 500 MICROGram(s) Nebulizer once  ipratropium 17 MICROgram(s) HFA Inhaler 1 Puff(s) Inhalation once  norepinephrine Infusion 0.05 MICROgram(s)/kG/Min IV Continuous <Continuous>  sertraline 25 milliGRAM(s) Oral daily  vancomycin  IVPB 750 milliGRAM(s) IV Intermittent every 24 hours    PRN MEDICATIONS      VITAL SIGNS: Last 24 Hours  T(C): 36.5 (12 May 2023 23:34), Max: 36.7 (12 May 2023 14:45)  T(F): 97.7 (12 May 2023 23:34), Max: 98 (12 May 2023 14:45)  HR: 101 (13 May 2023 03:10) (94 - 166)  BP: 105/56 (13 May 2023 03:10) (90/70 - 162/67)  BP(mean): 79 (12 May 2023 23:34) (79 - 79)  RR: 19 (12 May 2023 15:30) (16 - 20)  SpO2: 99% (13 May 2023 03:10) (94% - 99%)    LABS:                        13.4   9.75  )-----------( 330      ( 13 May 2023 02:56 )             43.3     05-13    137  |  105  |  12  ----------------------------<  241<H>  4.7   |  14<L>  |  1.1    Ca    9.1      13 May 2023 02:56  Phos  4.8     05-13  Mg     2.0     05-13    TPro  6.3  /  Alb  3.5  /  TBili  0.5  /  DBili  x   /  AST  54<H>  /  ALT  54<H>  /  AlkPhos  128<H>  05-13    ABG - ( 13 May 2023 02:47 )  pH, Arterial: 7.18  pH, Blood: x     /  pCO2: 46    /  pO2: 61    / HCO3: 17    / Base Excess: -11.0 /  SaO2: 86.9        CARDIAC MARKERS ( 13 May 2023 02:56 )  x     / <0.01 ng/mL / x     / x     / x      CARDIAC MARKERS ( 12 May 2023 16:10 )  x     / <0.01 ng/mL / x     / x     / x          RADIOLOGY:  < from: Xray Chest 1 View- PORTABLE-Urgent (05.12.23 @ 16:06) >  Impression: Cardiomegaly, bilateral opacities/pleural effusions, left upper lobe bulla.      ASSESSMENT & PLAN:     76yo F hx of NSCLC stage 3 s/p CRT and surgery 2018, emphysema, severe mitral valve regurgitation s/p MVR, paroxysmal atrial fibrillation previously on coumadin but stopped after MVR and KOBY closure, former smoker presenting with progressive shortness of breath on exertion, and intermittent dry cough lasting for past 2 weeks.    #Cardiogenic shock  - Rapid response called on 5/13 for altered mental status and hypotension  - Bedside echo showed severely decreased LVEF  - In setting of recent cardizem and lopressor given in ED for Afib RVR  - Intubated for respiratory support and airway protection  - CT angio chest: wet read, no saddle or segmental PE, large b/l pleural effusions. Follow-up official read.  - CT head non-con: wet read, no large intracranial hemorrhage. Follow-up official read  - Bumex gtt  - On dobutamine infusion    #Paroxysmal Atrial fibrillation with RVR  #Hx of severe MVR s/p MVR  #s/p KOBY closure  #Elevated BNP  - cont telemetry  - check TSH  - TTE  - trend trop  - consult EP (previously was seeing Dr. Martinez) for HR control and AC recs    #LLL opacity/pleural effusion? vs PNA?  - Chest Xray findings noted  - CT Chest with IV contrast for evaluation  - check procal  - Cefepime and vanc for now    #transaminitis  - RUQ US  - sertraline rarely causes transaminitis    #Hx of anxiety  - cont sertraline 25    #Hx of emphysema  #Hx of NSCLC s/p CRT and surgery    #DVT ppx: prophylactic DVT ppx for now  #GI ppx: none  #Diet: DASH/TLC  #Code: full  #Activity: IAT  #Dispo: upgrade to CCU      For Follow-Up:  - Follow-up official CTA chest and CTH reads  - Diuresis with bumex drip  - Pull back R IJ TLC by 1 cm

## 2023-05-13 NOTE — CONSULT NOTE ADULT - SUBJECTIVE AND OBJECTIVE BOX
HPI:  76yo F hx of NSCLC stage 3 s/p CRT and surgery 2018, emphysema, severe mitral valve regurgitation s/p MVR, paroxysmal atrial fibrillation previously on coumadin but stopped after MVR and KOBY closure, former smoker presenting with progressive shortness of breath on exertion, and intermittent dry cough lasting for past 2 weeks. Denies chest pain, palpitations, headaches, wheezing, abdominal pain, fevers, sick contacts, nausea/vomiting. Was prescribed meds from PMD without symptomatic relief. Presented to the ED from home.    At the ED, VS- T: 36.7C, BP: 90/70, HR: 160s, SpO2: 99% on RA, RR: 16. Labs significant for BNP- 6227, and elevated AST/ALT (54/60). Troponin<0.01 x1. EKG showed atrial fibrillation with RVR. CT Chest NC showed unchanged RLL GGO.     Admitted to telemetry for afib with RVR. Given 60mg x1 therapeutic lovenox. 1L LR bolus. For afib with  RVR, was given 20mg IVP diltiazem and 60mg PO. HR improved to 94.  (12 May 2023 23:20)    Overnight the patient had a rapid response and was intubated           I reviewed the radiology tests and hospital record including the ED chart.    I reviewed the other consultants comments that are available in the chart.    CC/ HPI Patient is a 77y old  Female who presents with a chief complaint of atrial fib (12 May 2023 23:20)      Atrial fibrillation        HTN (hypertension)    Paroxysmal atrial fibrillation    Non-small cell lung cancer    H/O mitral valve replacement    Severe mitral valve regurgitation    Atrial fibrillation    SOB          FAMILY HISTORY:      No Known Allergies      Soc:  see Hpi.    Home prescriptions  metoprolol succinate 25 mg oral tablet, extended release: 1 tab(s) orally once a day  sertraline 25 mg oral tablet: 1 tab(s) orally once a day      MEDICATIONS  (STANDING):  buMETAnide Infusion 2 mG/Hr (10 mL/Hr) IV Continuous <Continuous>  calcium gluconate IVPB 1 Gram(s) IV Intermittent once  cefepime   IVPB      cefepime   IVPB 2000 milliGRAM(s) IV Intermittent every 12 hours  chlorhexidine 0.12% Liquid 15 milliLiter(s) Oral Mucosa every 12 hours  dexMEDEtomidine Infusion 0.1 MICROgram(s)/kG/Hr (1.42 mL/Hr) IV Continuous <Continuous>  DOBUTamine Infusion 2.5 MICROgram(s)/kG/Min (2.13 mL/Hr) IV Continuous <Continuous>  enoxaparin Injectable 60 milliGRAM(s) SubCutaneous every 12 hours  fentaNYL   Infusion. 0.5 MICROgram(s)/kG/Hr (2.84 mL/Hr) IV Continuous <Continuous>  ipratropium  for Nebulization. 500 MICROGram(s) Nebulizer once  ipratropium 17 MICROgram(s) HFA Inhaler 1 Puff(s) Inhalation once  norepinephrine Infusion 0.05 MICROgram(s)/kG/Min (5.63 mL/Hr) IV Continuous <Continuous>  pantoprazole  Injectable 40 milliGRAM(s) IV Push daily  sertraline 25 milliGRAM(s) Oral daily  vancomycin  IVPB 750 milliGRAM(s) IV Intermittent every 24 hours    MEDICATIONS  (PRN):      lactated ringers Bolus:   500 milliLiter(s), IV Bolus, once, infuse over 30 Minute(s), Stop After 1 Doses  lactated ringers Bolus:   1000 milliLiter(s), IV Bolus, once, infuse over 60 Minute(s), Stop After 1 Doses  Provider's Contact #: [phone]      T(C): 35.3 (05-13-23 @ 07:15), Max: 36.7 (05-12-23 @ 14:45)  HR: 116 (05-13-23 @ 10:00) (89 - 166)  BP: 122/57 (05-13-23 @ 07:15) (90/70 - 162/67)  RR: 23 (05-13-23 @ 10:00) (16 - 24)  SpO2: 100% (05-13-23 @ 10:00) (94% - 100%)  ICU Vital Signs Last 24 Hrs  T(C): 35.3 (13 May 2023 07:15), Max: 36.7 (12 May 2023 14:45)  T(F): 95.5 (13 May 2023 07:15), Max: 98 (12 May 2023 14:45)  HR: 116 (13 May 2023 10:00) (89 - 166)  BP: 122/57 (13 May 2023 07:15) (90/70 - 162/67)  BP(mean): 80 (13 May 2023 07:15) (79 - 80)  ABP: 114/70 (13 May 2023 10:00) (114/70 - 130/71)  ABP(mean): 84 (13 May 2023 10:00) (84 - 98)  RR: 23 (13 May 2023 10:00) (16 - 24)  SpO2: 100% (13 May 2023 10:00) (94% - 100%)    O2 Parameters below as of 13 May 2023 10:00  Patient On (Oxygen Delivery Method): ventilator    O2 Concentration (%): 50    I&O's Summary    12 May 2023 07:01  -  13 May 2023 07:00  --------------------------------------------------------  IN: 55.3 mL / OUT: 1350 mL / NET: -1294.7 mL    13 May 2023 07:01  -  13 May 2023 10:23  --------------------------------------------------------  IN: 155.5 mL / OUT: 950 mL / NET: -794.5 mL        I&O's Detail    12 May 2023 07:01  -  13 May 2023 07:00  --------------------------------------------------------  IN:    Bumetanide: 10 mL    Dexmedetomidine: 12 mL    DOBUTamine: 4.5 mL    FentaNYL: 12 mL    Norepinephrine: 16.8 mL  Total IN: 55.3 mL    OUT:    Indwelling Catheter - Urethral (mL): 1350 mL  Total OUT: 1350 mL    Total NET: -1294.7 mL      13 May 2023 07:01  -  13 May 2023 10:23  --------------------------------------------------------  IN:    Bumetanide: 30 mL    Dexmedetomidine: 36 mL    DOBUTamine: 13.5 mL    FentaNYL: 36 mL    Norepinephrine: 8.4 mL    Norepinephrine: 31.6 mL  Total IN: 155.5 mL    OUT:    Indwelling Catheter - Urethral (mL): 950 mL  Total OUT: 950 mL    Total NET: -794.5 mL          Drug Dosing Weight  Height (cm): 157.5 (13 May 2023 07:15)  Weight (kg): 60 (13 May 2023 07:15)  BMI (kg/m2): 24.2 (13 May 2023 07:15)  BSA (m2): 1.6 (13 May 2023 07:15)    LABS:                          12.0   16.62 )-----------( 312      ( 13 May 2023 06:12 )             36.3       05-13    141  |  103  |  15  ----------------------------<  181<H>  4.2   |  27  |  1.0    Ca    8.3<L>      13 May 2023 06:12  Phos  4.8     05-13  Mg     1.6     05-13    TPro  5.3<L>  /  Alb  3.1<L>  /  TBili  0.8  /  DBili  x   /  AST  105<H>  /  ALT  72<H>  /  AlkPhos  122<H>  05-13      LIVER FUNCTIONS - ( 13 May 2023 06:12 )  Alb: 3.1 g/dL / Pro: 5.3 g/dL / ALK PHOS: 122 U/L / ALT: 72 U/L / AST: 105 U/L / GGT: x             CARDIAC MARKERS ( 13 May 2023 06:12 )  x     / <0.01 ng/mL / x     / x     / x      CARDIAC MARKERS ( 13 May 2023 02:56 )  x     / <0.01 ng/mL / x     / x     / x      CARDIAC MARKERS ( 12 May 2023 16:10 )  x     / <0.01 ng/mL / x     / x     / x            D-Dimer Assay, Quantitative: 753 ng/mL DDU (05-13-23 @ 03:55)    Lactate, Blood: 1.9 mmol/L (05-13-23 @ 06:12)          COVID-19 PCR: NotDetec (12 May 2023 23:21)          cefepime   IVPB      cefepime   IVPB 2000 milliGRAM(s) IV Intermittent every 12 hours  vancomycin  IVPB 750 milliGRAM(s) IV Intermittent every 24 hours      ABG - ( 13 May 2023 02:47 )  pH, Arterial: 7.18  pH, Blood: x     /  pCO2: 46    /  pO2: 61    / HCO3: 17    / Base Excess: -11.0 /  SaO2: 86.9                Mode: AC/ CMV (Assist Control/ Continuous Mandatory Ventilation)  RR (machine): 24  TV (machine): 300  FiO2: 50  PEEP: 8  ITime: 0.8  MAP: 11  PIP: 18          RADIOLOGY:  I have personally reviewed all chest and other pertinent radiology films.         REVIEW OF SYSTEMS:   see Hpi.    PHYSICAL EXAM:       · ENMT:   Airway patent,   Nasal mucosa clear.  Mouth with normal mucosa.   No thrush    · EYES:   Clear bilaterally,   pupils equal,   round and reactive to light.    · CARDIAC:   Normal rate,   regular rhythm.    Heart sounds S1, S2.   no thrills or bruits on palpitation  normal  cardiac impulse  No murmurs, no rubs or gallops on auscultation  no edema        CAROTID:   normal systolic impulse  no bruits    · RESPIRATORY:   rales  normal chest expansion  not tachypneic,  no retractions or use of accessory muscles  palpation of chest is normal with no fremitus  percussion of chest demonstrates  dullness at bases    · GASTROINTESTINAL:  Abdomen soft,   non-tender,   no guarding,   + BS  liver spleen not palpable      · MUSCULOSKELETAL:   range of motion is not limited,  no clubbing, cyanosis  no petechiae      · SKIN:   Skin normal color for race,   warm,   dry and intact.       · HEME LYMPH:   no splenomegaly.  No cervical  lymphadenopathy.  no inguinal lymphadenopathy

## 2023-05-13 NOTE — PROCEDURE NOTE - NSPROCDETAILS_GEN_ALL_CORE
guidewire visualized within IJV via US prior to vessel dilation and catheter advancement/guidewire recovered/lumen(s) aspirated and flushed/sterile dressing applied/sterile technique, catheter placed/ultrasound guidance with use of sterile gel and probe cove
location identified, draped/prepped, sterile technique used, needle inserted/introduced/positive blood return obtained via catheter/connected to a pressurized flush line/sutured in place/hemostasis with direct pressure, dressing applied/Seldinger technique/all materials/supplies accounted for at end of procedure

## 2023-05-13 NOTE — RAPID RESPONSE TEAM SUMMARY - NSOTHERINTERVENTIONSRRT_GEN_ALL_CORE
Attempted to call patient's son, Obed Rogers [phone], multiple times. Calls would go straight to voicemail with message saying voicemail inbox full. - D-dimer 753. Given clinical presentation (SOB and A.fib RVR with hypotension), will get CTA PE protocol STAT.     Attempted to call patient's son, Obed Rogers [phone], multiple times. Calls would go straight to voicemail with message saying voicemail inbox full.    Attending Attestation:    I have personally and independently provided 75 minutes of critical care services. This excludes any time spent on separate procedures or teaching.        - D-dimer 753. Given clinical presentation (SOB and A.fib RVR with hypotension), will get CTA PE protocol STAT.     Attempted to call patient's son, Obed Rogers [phone], multiple times. Calls would go straight to voicemail with message saying voicemail inbox full.    Attending Attestation:    I have personally and independently provided 90 minutes of critical care services. This excludes any time spent on separate procedures or teaching.

## 2023-05-13 NOTE — RAPID RESPONSE TEAM SUMMARY - NSSITUATIONBACKGROUNDRRT_GEN_ALL_CORE
Patient admitted for new onset atrial fibrillation in RVR Patient admitted for new onset atrial fibrillation in RVR.

## 2023-05-13 NOTE — PATIENT PROFILE ADULT - FALL HARM RISK - HARM RISK INTERVENTIONS
Assistance with ambulation/Assistance OOB with selected safe patient handling equipment/Communicate Risk of Fall with Harm to all staff/Discuss with provider need for PT consult/Monitor gait and stability/Provide patient with walking aids - walker, cane, crutches/Reinforce activity limits and safety measures with patient and family/Review medications for side effects contributing to fall risk/Sit up slowly, dangle for a short time, stand at bedside before walking/Tailored Fall Risk Interventions/Toileting schedule using arm’s reach rule for commode and bathroom/Visual Cue: Yellow wristband and red socks/Bed in lowest position, wheels locked, appropriate side rails in place/Call bell, personal items and telephone in reach/Instruct patient to call for assistance before getting out of bed or chair/Non-slip footwear when patient is out of bed/[place] to call system/Physically safe environment - no spills, clutter or unnecessary equipment/Purposeful Proactive Rounding/Room/bathroom lighting operational, light cord in reach

## 2023-05-13 NOTE — PROGRESS NOTE ADULT - SUBJECTIVE AND OBJECTIVE BOX
AROLDO KO 77y Female  MRN#: [id number]   Hospital Day: 1d    SUBJECTIVE  Patient is a 77y old Female who presents with a chief complaint of atrial fib (13 May 2023 10:23)  Currently admitted to medicine with the primary diagnosis of Atrial fibrillation      INTERVAL HPI AND OVERNIGHT EVENTS:  Patient was examined and seen at bedside. This morning she is resting comfortably in bed and reports no issues or overnight events.    OBJECTIVE  PAST MEDICAL & SURGICAL HISTORY  HTN (hypertension)    Paroxysmal atrial fibrillation    Non-small cell lung cancer    H/O mitral valve replacement    Severe mitral valve regurgitation      ALLERGIES:  No Known Allergies    MEDICATIONS:  STANDING MEDICATIONS  buMETAnide Infusion 2 mG/Hr IV Continuous <Continuous>  cefepime   IVPB      cefepime   IVPB 2000 milliGRAM(s) IV Intermittent every 12 hours  chlorhexidine 0.12% Liquid 15 milliLiter(s) Oral Mucosa every 12 hours  dexMEDEtomidine Infusion 0.1 MICROgram(s)/kG/Hr IV Continuous <Continuous>  DOBUTamine Infusion 5 MICROgram(s)/kG/Min IV Continuous <Continuous>  enoxaparin Injectable 60 milliGRAM(s) SubCutaneous every 12 hours  fentaNYL   Infusion. 0.5 MICROgram(s)/kG/Hr IV Continuous <Continuous>  ipratropium  for Nebulization. 500 MICROGram(s) Nebulizer once  ipratropium 17 MICROgram(s) HFA Inhaler 1 Puff(s) Inhalation once  norepinephrine Infusion 0.05 MICROgram(s)/kG/Min IV Continuous <Continuous>  pantoprazole  Injectable 40 milliGRAM(s) IV Push daily  potassium chloride  20 mEq/100 mL IVPB 20 milliEquivalent(s) IV Intermittent every 2 hours  sertraline 25 milliGRAM(s) Oral daily  vancomycin  IVPB 750 milliGRAM(s) IV Intermittent every 24 hours    PRN MEDICATIONS      Vitals:  VITAL SIGNS: Last 24 Hours  T(C): 36.4 (13 May 2023 16:00), Max: 36.5 (12 May 2023 23:34)  T(F): 97.5 (13 May 2023 16:00), Max: 97.7 (12 May 2023 23:34)  HR: 96 (13 May 2023 19:00) (81 - 166)  BP: 106/53 (13 May 2023 18:00) (86/63 - 150/88)  BP(mean): 76 (13 May 2023 18:00) (62 - 80)  RR: 35 (13 May 2023 19:00) (23 - 35)  SpO2: 99% (13 May 2023 19:00) (94% - 100%)  Orthostatic Vitals:  Orthostatic VS    I's&O's:  I&O's Summary    12 May 2023 07:01  -  13 May 2023 07:00  --------------------------------------------------------  IN: 55.3 mL / OUT: 1350 mL / NET: -1294.7 mL    13 May 2023 07:01  -  13 May 2023 19:49  --------------------------------------------------------  IN: 636.8 mL / OUT: 2925 mL / NET: -2288.1 mL      CBC:                        13.9   12.51 )-----------( 342      ( 13 May 2023 16:22 )             40.5     CMP:  05-13    139  |  99  |  13  ----------------------------<  120<H>  3.4<L>   |  26  |  1.0    Ca    8.9      13 May 2023 16:22  Phos  4.8     05-13  Mg     1.9     05-13    TPro  6.3  /  Alb  3.5  /  TBili  0.5  /  DBili  x   /  AST  77<H>  /  ALT  74<H>  /  AlkPhos  141<H>  05-13    POCS:  CAPILLARY BLOOD GLUCOSE      POCT Blood Glucose.: 157 mg/dL (13 May 2023 07:45)  POCT Blood Glucose.: 203 mg/dL (13 May 2023 02:45)        ABG - ( 13 May 2023 08:49 )  pH, Arterial: 7.42  pH, Blood: x     /  pCO2: 45    /  pO2: 142   / HCO3: 29    / Base Excess: 4.0   /  SaO2: 97.1              Troponin T, Serum: <0.01 ng/mL (05-13-23 @ 11:00)  Lactate, Blood: 1.0 mmol/L (05-13-23 @ 11:00)  Troponin T, Serum: <0.01 ng/mL (05-13-23 @ 06:12)  Lactate, Blood: 1.9 mmol/L (05-13-23 @ 06:12)  Troponin T, Serum: <0.01 ng/mL (05-13-23 @ 02:56)      CARDIAC MARKERS ( 13 May 2023 11:00 )  x     / <0.01 ng/mL / x     / x     / x      CARDIAC MARKERS ( 13 May 2023 06:12 )  x     / <0.01 ng/mL / x     / x     / x      CARDIAC MARKERS ( 13 May 2023 02:56 )  x     / <0.01 ng/mL / x     / x     / x      CARDIAC MARKERS ( 12 May 2023 16:10 )  x     / <0.01 ng/mL / x     / x     / x          LIVER FUNCTIONS - ( 13 May 2023 16:22 )  Alb: 3.5 g/dL / Pro: 6.3 g/dL / ALK PHOS: 141 U/L / ALT: 74 U/L / AST: 77 U/L / GGT: x           COVID-19 PCR: NotDetec (12 May 2023 23:21)

## 2023-05-13 NOTE — RAPID RESPONSE TEAM SUMMARY - NSMEDICATIONSRRT_GEN_ALL_CORE
500cc IV fluid bolus  Cefepime, Vancomycin  Dobutamine - Total of 500cc IV fluid given  - NaHCO3 50mEq x 2   - Vancomycin, cefepime   - Dobutamine and Levophed.   - Bumex 2mg x 1, then bumex drip   - Precedex and fentanyl for sedation.

## 2023-05-13 NOTE — PROCEDURE NOTE - NSINDICATIONS_GEN_A_CORE
arterial puncture to obtain ABG's/critical patient/monitoring purposes
critical illness/emergency venous access/venous access

## 2023-05-13 NOTE — CONSULT NOTE ADULT - ASSESSMENT
IMPRESSION:  acute resp failure  hypoxia  congestive heart failure  HFrEF   atrial fibrillation  B/L small pleural effusions due to pulmonary edema      SUGGEST:  keep sedated  card management of heart failure  negative fluid balance  Strict I&Os   troponin, lipids, HA1c and TSH.  no change in vent Mve  O2 to maintain SaO2 >90<94%  echo doppler  needs feeding tube  start nutrition  gastritis prophylaxis  DVT/Gastritis prophylaxis

## 2023-05-13 NOTE — PROCEDURE NOTE - ESTIMATED BLOOD LOSS
Chief Complaint  Diabetes and Hypertension    Subjective        Suzy Nieto presents to [facility]  History of Present Illness  The patient is here today to follow-up on chronic health conditions:    Hyperlipidemia.  The patient is compliant with taking medication.  Denies any side effects.  cholesterol is stable.    Hypertension.  The patient is compliant with medications.  Blood pressures have been good at home.  Denies any side effects.    Diabetes type 2.  The patient takes metformin, Jardiance and Janumet and is consistent with medications.  She denies any side effects.  Her diabetes is still not well controlled.  Her diet has been better and she has lost some weight but she still has some improvements that she can make.    COPD.  The patient continues to smoke.  She is not interested in smoking cessation at this time.  She has been doing well on Symbicort which was started by another provider in the past.    Positive microalbumin.  January 2023 microalbumin was positive but 24-hour urine was negative.    Diabetes  She has type 2 diabetes mellitus. No MedicAlert identification noted. The initial diagnosis of diabetes was made 20 Years ago. Pertinent negatives for hypoglycemia include no confusion, dizziness, headaches, hunger, mood changes, nervousness/anxiousness, pallor, seizures, sleepiness, speech difficulty, sweats or tremors. Pertinent negatives for diabetes include no blurred vision, no chest pain, no fatigue, no foot paresthesias, no foot ulcerations, no polydipsia, no polyphagia, no polyuria, no visual change, no weakness and no weight loss. Pertinent negatives for hypoglycemia complications include no blackouts, no hospitalization, no nocturnal hypoglycemia, no required assistance and no required glucagon injection. Pertinent negatives for diabetic complications include no CVA, heart disease, impotence, nephropathy, peripheral neuropathy, PVD or retinopathy. There are 
"no known risk factors for coronary artery disease. When asked about current treatments, none were reported. She is compliant with treatment most of the time. Her weight is stable. She is following a generally healthy diet. When asked about meal planning, she reported none. She has not had a previous visit with a dietitian. There is no change in her home blood glucose trend. She does not see a podiatrist.Eye exam is current.       Objective   Vital Signs:  /58   Pulse 87   Temp 97.7 °F (36.5 °C)   Ht 154.9 cm (61\")   Wt 65 kg (143 lb 6.4 oz)   SpO2 98%   BMI 27.10 kg/m²   Estimated body mass index is 27.1 kg/m² as calculated from the following:    Height as of this encounter: 154.9 cm (61\").    Weight as of this encounter: 65 kg (143 lb 6.4 oz).             Physical Exam  Vitals and nursing note reviewed.   Constitutional:       Appearance: Normal appearance. She is well-developed.   HENT:      Head: Normocephalic and atraumatic.      Right Ear: External ear normal.      Left Ear: External ear normal.      Nose: Nose normal.   Eyes:      General: No scleral icterus.     Conjunctiva/sclera: Conjunctivae normal.   Cardiovascular:      Rate and Rhythm: Normal rate and regular rhythm.      Heart sounds: Normal heart sounds.   Pulmonary:      Effort: Pulmonary effort is normal.      Breath sounds: Normal breath sounds.   Musculoskeletal:      Cervical back: Normal range of motion and neck supple.      Right lower leg: No edema.      Left lower leg: No edema.   Lymphadenopathy:      Cervical: No cervical adenopathy.   Skin:     General: Skin is warm and dry.      Findings: No rash.   Neurological:      Mental Status: She is alert and oriented to person, place, and time.   Psychiatric:         Mood and Affect: Mood normal.         Behavior: Behavior normal.         Thought Content: Thought content normal.         Judgment: Judgment normal.        Result Review :  The following data was reviewed by: Myah Wilkins "
DO Brionna on 04/13/2023:  Common labs        10/3/2022    08:06 10/3/2022    08:41 1/4/2023    08:05 1/4/2023    08:27 4/13/2023    08:04   Common Labs   Glucose  127    189      BUN  13    17      Creatinine  1.01    1.18      Sodium  142    139      Potassium  4.7    4.8      Chloride  104    99      Calcium  10.0    9.8      Total Protein  7.0    7.4      Albumin  4.60    4.6      Total Bilirubin  0.3    0.2      Alkaline Phosphatase  69    78      AST (SGOT)  23    43      ALT (SGPT)  18    27      WBC  10.72    12.57      Hemoglobin  14.4    15.2      Hematocrit  44.6    46.7      Platelets  332    348      Total Cholesterol  115        Triglycerides  265        HDL Cholesterol  35        LDL Cholesterol   39        Hemoglobin A1C 7.6    9.1    9.1       TSH        6/13/2022    11:40 10/3/2022    08:41   TSH   TSH 2.280   3.370      Answers for HPI/ROS submitted by the patient on 4/6/2023  What is the primary reason for your visit?: Diabetes                   Assessment and Plan   Diagnoses and all orders for this visit:    1. Type 2 diabetes mellitus without complication, without long-term current use of insulin (Primary)  -     POC Glycosylated Hemoglobin (Hb A1C)  -     glipizide (GLUCOTROL) 5 MG tablet; Take 1 tablet by mouth Every Morning.  Dispense: 30 tablet; Refill: 2    2. Chronic obstructive pulmonary disease, unspecified COPD type    3. Mixed hyperlipidemia    4. Essential hypertension    5. Microalbuminuria    6. Encounter for long-term (current) use of medications    Uncontrolled diabetes.  The patient will be started on a new medication, glipizide as above.  She will continue metformin, Januvia, and Jardiance.  She will also work harder on her diet and increase her exercise.    Patient is also here to follow-up on chronic stable COPD hyperlipidemia, and hypertension.   Surveillance labs were obtained today and any medication changes will be made based on lab results and will be called to the 
patient later this week.           Follow Up   Return in about 3 months (around 7/13/2023) for Diabetes.  Patient was given instructions and counseling regarding her condition or for health maintenance advice. Please see specific information pulled into the AVS if appropriate.       
None
Minimal

## 2023-05-13 NOTE — CHART NOTE - NSCHARTNOTEFT_GEN_A_CORE
Attempted again to call patient's son Obed Rogers at [phone] at 0730, but no answer and voicemail inbox was full.    Please try to contact later today for update and discussion of code status.

## 2023-05-13 NOTE — PROGRESS NOTE ADULT - ASSESSMENT
76yo F hx of NSCLC stage 3 s/p CRT and surgery 2018, emphysema, severe mitral valve regurgitation s/p MVR, paroxysmal atrial fibrillation previously on coumadin but stopped after MVR and KOBY closure, former smoker presenting with progressive shortness of breath on exertion, and intermittent dry cough lasting for past 2 weeks.    #Cardiogenic shock  - Rapid response called on 5/13 for altered mental status and hypotension  - Bedside echo showed severely decreased LVEF  - In setting of recent cardizem and lopressor given in ED for Afib RVR  - Intubated for respiratory support and airway protection  - CT angio chest: wet read, no saddle or segmental PE, large b/l pleural effusions. Follow-up official read.  - CT head non-con: wet read, no large intracranial hemorrhage. Follow-up official read  - On Bumex gtt  - On dobutamine infusion    #Paroxysmal Atrial fibrillation with RVR  #Hx of severe MVR s/p MVR  #s/p KOBY closure  #Elevated BNP  - cont telemetry  - check TSH  - TTE  - trend trop  - consult EP (previously was seeing Dr. Martinez) for HR control and AC recs    #LLL opacity/pleural effusion? vs PNA?  - Chest Xray findings noted  - CT Chest with IV contrast for evaluation  - check procal  - Cefepime and vanc for now    #transaminitis  - RUQ US  - sertraline rarely causes transaminitis    #Hx of anxiety  - cont sertraline 25    #Hx of emphysema  #Hx of NSCLC s/p CRT and surgery    #DVT ppx: prophylactic DVT ppx for now  #GI ppx: none  #Diet: DASH/TLC  #Code: full  #Activity: IAT  #Dispo: CCU

## 2023-05-13 NOTE — CHART NOTE - NSCHARTNOTEFT_GEN_A_CORE
At 0130, Pt's HR increased to 160s. Pt uncomfortable, complaining of "racing heart."     EKG showing afib RVR.    Given IV metoprolol 5mg x 2 (0135 and 0140), pt HR subsequently decreased to 110s.    Pt reports symptomatic improvement. At 0130, Pt's HR increased to 160s. /80. Pt uncomfortable, complaining of "racing heart."     EKG showing afib RVR.    Given IV metoprolol 5mg x 2 (0135 and 0140), pt HR subsequently decreased to 110s.    Pt reports symptomatic improvement.

## 2023-05-13 NOTE — RAPID RESPONSE TEAM SUMMARY - NSADDTLFINDINGSRRT_GEN_ALL_CORE
Pt very lethargic, but answering questions. Trace wheezing on auscultation. Initial vital signs: BP unable to obtained accurate measurement due to suspected hypotension, , SpO2 98% on NRB. Given fluid bolus. ABG showed pH 7.18, pCO2 46, pO2 61, lactate 7.0. BP measured at 90/50.    Stat labs drawn: CBC, CMP, Mg, Phos, Troponin, Blood cultures.    Intubated for airway protection    Bedside echo showed severely decreased LVEF.    Started on dobutamine infusion. Pt very lethargic, but answering questions. Trace wheezing on auscultation. Initial vital signs: BP unable to obtained accurate measurement due to suspected hypotension, , SpO2 98% on NRB. Given fluid bolus. ABG showed pH 7.18, pCO2 46, pO2 61, lactate 7.0. Repeat BP after fluids started measured at 90/50.    Stat labs drawn: CBC, CMP, Mg, Phos, Troponin, Blood cultures.    Intubated for airway protection    Bedside echo showed severely decreased LVEF.    Started on dobutamine infusion. Patient was very lethargic, but responsive to verbal stimuli and able to answer questions and follow commands. Trace wheezing on auscultation. Atrovent given. Irregularly irregular rhythm. Initial vital signs: BP unable to obtained accurate measurement due to suspected hypotension, HR ~, SpO2 98% on NRB. IVF NS bolus started. ABG showed pH 7.18, pCO2 46, pO2 61, lactate 7.0. Repeat BP after fluids started measured at 90/50.  Stat labs drawn: CBC, CMP, Mg, Phos, Coag, Troponin, Blood cultures.    Intubated for airway protection.   Bedside Echo showed severely decreased LVEF with dilated IVC. At that point, IVF discontinued (patient received 500 cc IVF total during RRT).   Spoke with Cardiac fellow who recommends to started on dobutamine infusion for suspected cardiogenic shock and levophed to keep MAP >65.     TLC placed at left IJ.

## 2023-05-14 LAB
ALBUMIN SERPL ELPH-MCNC: 3.8 G/DL — SIGNIFICANT CHANGE UP (ref 3.5–5.2)
ALP SERPL-CCNC: 139 U/L — HIGH (ref 30–115)
ALT FLD-CCNC: 67 U/L — HIGH (ref 0–41)
ANION GAP SERPL CALC-SCNC: 12 MMOL/L — SIGNIFICANT CHANGE UP (ref 7–14)
ANION GAP SERPL CALC-SCNC: 14 MMOL/L — SIGNIFICANT CHANGE UP (ref 7–14)
APPEARANCE UR: CLEAR — SIGNIFICANT CHANGE UP
AST SERPL-CCNC: 59 U/L — HIGH (ref 0–41)
BASE EXCESS BLDV CALC-SCNC: 8.6 MMOL/L — HIGH (ref -2–3)
BASOPHILS # BLD AUTO: 0.06 K/UL — SIGNIFICANT CHANGE UP (ref 0–0.2)
BASOPHILS NFR BLD AUTO: 0.4 % — SIGNIFICANT CHANGE UP (ref 0–1)
BILIRUB SERPL-MCNC: 0.4 MG/DL — SIGNIFICANT CHANGE UP (ref 0.2–1.2)
BILIRUB UR-MCNC: NEGATIVE — SIGNIFICANT CHANGE UP
BUN SERPL-MCNC: 14 MG/DL — SIGNIFICANT CHANGE UP (ref 10–20)
BUN SERPL-MCNC: 14 MG/DL — SIGNIFICANT CHANGE UP (ref 10–20)
CA-I SERPL-SCNC: 1.07 MMOL/L — LOW (ref 1.15–1.33)
CALCIUM SERPL-MCNC: 8.9 MG/DL — SIGNIFICANT CHANGE UP (ref 8.4–10.5)
CALCIUM SERPL-MCNC: 9.1 MG/DL — SIGNIFICANT CHANGE UP (ref 8.4–10.5)
CHLORIDE SERPL-SCNC: 102 MMOL/L — SIGNIFICANT CHANGE UP (ref 98–110)
CHLORIDE SERPL-SCNC: 98 MMOL/L — SIGNIFICANT CHANGE UP (ref 98–110)
CO2 SERPL-SCNC: 27 MMOL/L — SIGNIFICANT CHANGE UP (ref 17–32)
CO2 SERPL-SCNC: 28 MMOL/L — SIGNIFICANT CHANGE UP (ref 17–32)
COLOR SPEC: COLORLESS — SIGNIFICANT CHANGE UP
CREAT SERPL-MCNC: 1.1 MG/DL — SIGNIFICANT CHANGE UP (ref 0.7–1.5)
CREAT SERPL-MCNC: 1.1 MG/DL — SIGNIFICANT CHANGE UP (ref 0.7–1.5)
DIFF PNL FLD: NEGATIVE — SIGNIFICANT CHANGE UP
EGFR: 51 ML/MIN/1.73M2 — LOW
EGFR: 51 ML/MIN/1.73M2 — LOW
EOSINOPHIL # BLD AUTO: 0.04 K/UL — SIGNIFICANT CHANGE UP (ref 0–0.7)
EOSINOPHIL NFR BLD AUTO: 0.3 % — SIGNIFICANT CHANGE UP (ref 0–8)
GAS PNL BLDA: SIGNIFICANT CHANGE UP
GAS PNL BLDA: SIGNIFICANT CHANGE UP
GAS PNL BLDV: 134 MMOL/L — LOW (ref 136–145)
GAS PNL BLDV: SIGNIFICANT CHANGE UP
GAS PNL BLDV: SIGNIFICANT CHANGE UP
GLUCOSE SERPL-MCNC: 134 MG/DL — HIGH (ref 70–99)
GLUCOSE SERPL-MCNC: 146 MG/DL — HIGH (ref 70–99)
GLUCOSE UR QL: NEGATIVE — SIGNIFICANT CHANGE UP
HCO3 BLDV-SCNC: 35 MMOL/L — HIGH (ref 22–29)
HCT VFR BLD CALC: 41.7 % — SIGNIFICANT CHANGE UP (ref 37–47)
HCT VFR BLDA CALC: 44 % — SIGNIFICANT CHANGE UP (ref 39–51)
HGB BLD CALC-MCNC: 14.6 G/DL — SIGNIFICANT CHANGE UP (ref 12.6–17.4)
HGB BLD-MCNC: 14.1 G/DL — SIGNIFICANT CHANGE UP (ref 12–16)
IMM GRANULOCYTES NFR BLD AUTO: 0.7 % — HIGH (ref 0.1–0.3)
KETONES UR-MCNC: NEGATIVE — SIGNIFICANT CHANGE UP
LACTATE BLDV-MCNC: 1.3 MMOL/L — SIGNIFICANT CHANGE UP (ref 0.5–2)
LACTATE SERPL-SCNC: 1 MMOL/L — SIGNIFICANT CHANGE UP (ref 0.7–2)
LACTATE SERPL-SCNC: 1.3 MMOL/L — SIGNIFICANT CHANGE UP (ref 0.7–2)
LEUKOCYTE ESTERASE UR-ACNC: NEGATIVE — SIGNIFICANT CHANGE UP
LYMPHOCYTES # BLD AUTO: 1.58 K/UL — SIGNIFICANT CHANGE UP (ref 1.2–3.4)
LYMPHOCYTES # BLD AUTO: 11.7 % — LOW (ref 20.5–51.1)
MAGNESIUM SERPL-MCNC: 1.6 MG/DL — LOW (ref 1.8–2.4)
MAGNESIUM SERPL-MCNC: 2.4 MG/DL — SIGNIFICANT CHANGE UP (ref 1.8–2.4)
MCHC RBC-ENTMCNC: 31.2 PG — HIGH (ref 27–31)
MCHC RBC-ENTMCNC: 33.8 G/DL — SIGNIFICANT CHANGE UP (ref 32–37)
MCV RBC AUTO: 92.3 FL — SIGNIFICANT CHANGE UP (ref 81–99)
MONOCYTES # BLD AUTO: 1.34 K/UL — HIGH (ref 0.1–0.6)
MONOCYTES NFR BLD AUTO: 9.9 % — HIGH (ref 1.7–9.3)
MRSA PCR RESULT.: NEGATIVE — SIGNIFICANT CHANGE UP
NEUTROPHILS # BLD AUTO: 10.36 K/UL — HIGH (ref 1.4–6.5)
NEUTROPHILS NFR BLD AUTO: 77 % — HIGH (ref 42.2–75.2)
NITRITE UR-MCNC: NEGATIVE — SIGNIFICANT CHANGE UP
NRBC # BLD: 0 /100 WBCS — SIGNIFICANT CHANGE UP (ref 0–0)
PCO2 BLDV: 54 MMHG — HIGH (ref 39–42)
PH BLDV: 7.42 — SIGNIFICANT CHANGE UP (ref 7.32–7.43)
PH UR: 6.5 — SIGNIFICANT CHANGE UP (ref 5–8)
PLATELET # BLD AUTO: 412 K/UL — HIGH (ref 130–400)
PMV BLD: 9.2 FL — SIGNIFICANT CHANGE UP (ref 7.4–10.4)
PO2 BLDV: 48 MMHG — SIGNIFICANT CHANGE UP
POTASSIUM BLDV-SCNC: 3.3 MMOL/L — LOW (ref 3.5–5.1)
POTASSIUM SERPL-MCNC: 4.2 MMOL/L — SIGNIFICANT CHANGE UP (ref 3.5–5)
POTASSIUM SERPL-MCNC: 4.9 MMOL/L — SIGNIFICANT CHANGE UP (ref 3.5–5)
POTASSIUM SERPL-SCNC: 4.2 MMOL/L — SIGNIFICANT CHANGE UP (ref 3.5–5)
POTASSIUM SERPL-SCNC: 4.9 MMOL/L — SIGNIFICANT CHANGE UP (ref 3.5–5)
PROT SERPL-MCNC: 6.5 G/DL — SIGNIFICANT CHANGE UP (ref 6–8)
PROT UR-MCNC: SIGNIFICANT CHANGE UP
RBC # BLD: 4.52 M/UL — SIGNIFICANT CHANGE UP (ref 4.2–5.4)
RBC # FLD: 13.9 % — SIGNIFICANT CHANGE UP (ref 11.5–14.5)
SAO2 % BLDV: 79.6 % — SIGNIFICANT CHANGE UP
SODIUM SERPL-SCNC: 139 MMOL/L — SIGNIFICANT CHANGE UP (ref 135–146)
SODIUM SERPL-SCNC: 142 MMOL/L — SIGNIFICANT CHANGE UP (ref 135–146)
SP GR SPEC: 1.01 — SIGNIFICANT CHANGE UP (ref 1.01–1.03)
UROBILINOGEN FLD QL: SIGNIFICANT CHANGE UP
WBC # BLD: 13.48 K/UL — HIGH (ref 4.8–10.8)
WBC # FLD AUTO: 13.48 K/UL — HIGH (ref 4.8–10.8)

## 2023-05-14 PROCEDURE — 93010 ELECTROCARDIOGRAM REPORT: CPT

## 2023-05-14 PROCEDURE — 71045 X-RAY EXAM CHEST 1 VIEW: CPT | Mod: 26

## 2023-05-14 PROCEDURE — 99291 CRITICAL CARE FIRST HOUR: CPT

## 2023-05-14 PROCEDURE — 76705 ECHO EXAM OF ABDOMEN: CPT | Mod: 26

## 2023-05-14 RX ORDER — MAGNESIUM SULFATE 500 MG/ML
2 VIAL (ML) INJECTION ONCE
Refills: 0 | Status: COMPLETED | OUTPATIENT
Start: 2023-05-14 | End: 2023-05-14

## 2023-05-14 RX ORDER — ACETAMINOPHEN 500 MG
1000 TABLET ORAL ONCE
Refills: 0 | Status: COMPLETED | OUTPATIENT
Start: 2023-05-14 | End: 2023-05-14

## 2023-05-14 RX ORDER — CHLORHEXIDINE GLUCONATE 213 G/1000ML
15 SOLUTION TOPICAL
Refills: 0 | Status: DISCONTINUED | OUTPATIENT
Start: 2023-05-14 | End: 2023-05-18

## 2023-05-14 RX ORDER — POTASSIUM CHLORIDE 20 MEQ
20 PACKET (EA) ORAL
Refills: 0 | Status: COMPLETED | OUTPATIENT
Start: 2023-05-14 | End: 2023-05-14

## 2023-05-14 RX ORDER — BUMETANIDE 0.25 MG/ML
2 INJECTION INTRAMUSCULAR; INTRAVENOUS ONCE
Refills: 0 | Status: COMPLETED | OUTPATIENT
Start: 2023-05-14 | End: 2023-05-14

## 2023-05-14 RX ORDER — CHLORHEXIDINE GLUCONATE 213 G/1000ML
1 SOLUTION TOPICAL
Refills: 0 | Status: DISCONTINUED | OUTPATIENT
Start: 2023-05-14 | End: 2023-05-23

## 2023-05-14 RX ORDER — ACETAMINOPHEN 500 MG
650 TABLET ORAL ONCE
Refills: 0 | Status: DISCONTINUED | OUTPATIENT
Start: 2023-05-14 | End: 2023-05-14

## 2023-05-14 RX ADMIN — CHLORHEXIDINE GLUCONATE 15 MILLILITER(S): 213 SOLUTION TOPICAL at 17:06

## 2023-05-14 RX ADMIN — BUMETANIDE 2 MILLIGRAM(S): 0.25 INJECTION INTRAMUSCULAR; INTRAVENOUS at 18:13

## 2023-05-14 RX ADMIN — CHLORHEXIDINE GLUCONATE 1 APPLICATION(S): 213 SOLUTION TOPICAL at 05:04

## 2023-05-14 RX ADMIN — Medication 250 MILLIGRAM(S): at 04:25

## 2023-05-14 RX ADMIN — Medication 50 MILLIEQUIVALENT(S): at 16:40

## 2023-05-14 RX ADMIN — DEXMEDETOMIDINE HYDROCHLORIDE IN 0.9% SODIUM CHLORIDE 1.42 MICROGRAM(S)/KG/HR: 4 INJECTION INTRAVENOUS at 22:47

## 2023-05-14 RX ADMIN — AMIODARONE HYDROCHLORIDE 33.3 MG/MIN: 400 TABLET ORAL at 01:01

## 2023-05-14 RX ADMIN — PANTOPRAZOLE SODIUM 40 MILLIGRAM(S): 20 TABLET, DELAYED RELEASE ORAL at 11:35

## 2023-05-14 RX ADMIN — SERTRALINE 25 MILLIGRAM(S): 25 TABLET, FILM COATED ORAL at 11:35

## 2023-05-14 RX ADMIN — CHLORHEXIDINE GLUCONATE 15 MILLILITER(S): 213 SOLUTION TOPICAL at 05:02

## 2023-05-14 RX ADMIN — Medication 50 MILLIEQUIVALENT(S): at 15:20

## 2023-05-14 RX ADMIN — ENOXAPARIN SODIUM 60 MILLIGRAM(S): 100 INJECTION SUBCUTANEOUS at 17:07

## 2023-05-14 RX ADMIN — Medication 400 MILLIGRAM(S): at 04:09

## 2023-05-14 RX ADMIN — Medication 1000 MILLIGRAM(S): at 04:59

## 2023-05-14 RX ADMIN — AMIODARONE HYDROCHLORIDE 16.7 MG/MIN: 400 TABLET ORAL at 09:07

## 2023-05-14 RX ADMIN — ENOXAPARIN SODIUM 60 MILLIGRAM(S): 100 INJECTION SUBCUTANEOUS at 05:01

## 2023-05-14 RX ADMIN — CEFEPIME 100 MILLIGRAM(S): 1 INJECTION, POWDER, FOR SOLUTION INTRAMUSCULAR; INTRAVENOUS at 05:02

## 2023-05-14 RX ADMIN — Medication 50 MILLIEQUIVALENT(S): at 17:50

## 2023-05-14 RX ADMIN — DEXMEDETOMIDINE HYDROCHLORIDE IN 0.9% SODIUM CHLORIDE 1.42 MICROGRAM(S)/KG/HR: 4 INJECTION INTRAVENOUS at 01:30

## 2023-05-14 RX ADMIN — CEFEPIME 100 MILLIGRAM(S): 1 INJECTION, POWDER, FOR SOLUTION INTRAMUSCULAR; INTRAVENOUS at 17:06

## 2023-05-14 RX ADMIN — Medication 1000 MILLIGRAM(S): at 16:08

## 2023-05-14 RX ADMIN — Medication 25 GRAM(S): at 01:02

## 2023-05-14 RX ADMIN — Medication 400 MILLIGRAM(S): at 15:20

## 2023-05-14 NOTE — PROGRESS NOTE ADULT - NUTRITIONAL ASSESSMENT
77y F pmh NSCLC stage 3 s/p CRT/surgery 2018, emphysema, severe MR s/p MVR, pAFib s/p coumadin s/p MVR and KOBY closure, past smoker presenting with dyspnea on exertion and cough x 2 weeks admitted to CCU for cardiogenic vs septic shock.     #Cardiogenic/Septic shock   #Suspected aspiration pna   #pAfib RVR  #Severe MR s/p MVR and KOBY closure   - rapid response and upgrade to CCU 5/13 for AMS and hypotension   - bedside echo - severe decreased EF   - TTE 5/13 - diffuse hypokinesis of anterior wall, EF 25-30%  - troponin wnl   - s/p cardizem and lopressor in ER for Afib RVR  - intubated for airway protection   - CTA chest - no PE, b/l pleural effusions and ggo consistent with pulmonary edema   - TSH 7.26   - lactate wnl   - CTH - no acute intracranial pathology   - c/w levophed ggt, off dobutamine, wean as tolerated   - c/w fentanyl/precedex for sedation   - on bumex ggt - holding for now, bedside IVC 1.8 non-collapsing - reassess volume status in PM   - f/u procal, deep tracheal aspirate, Bcx, UA  - wean sedation as tolerated - SAT  - c/w amio ggt at 0.5mg/kg/hr  - c/w lovenox 60mg BID  - c/w cefepime [id number] IV, vancomycin d/c - MRSA -ve    #Transaminitis   - AST 59, ALT 67, , tbili 0.4  - pending RUQ US  - trend LFTs qD    #Anxiety   - c/w sertraline 25mg     # To follow up  - infectious workup for suspected PNA  - rate control on amio ggt  - wean sedation and pressors as tolerated   - assess volume status if diuresis needed    # Misc  - DVT Prophylaxis: enoxaparin Injectable  - GI Prophylaxis: pantoprazole  Injectable 40 milliGRAM(s) IV Push daily  - Diet: Diet, NPO  - Activity: Activity - Increase As Tolerated  Activity - Bedrest  - Code Status: Full     Suman Sanchez  PGY1, Internal Medicine   [facility]

## 2023-05-14 NOTE — PROGRESS NOTE ADULT - ASSESSMENT
IMPRESSION:  acute resp failure  hypoxia  congestive heart failure  HFrEF   atrial fibrillation  B/L small pleural effusions due to pulmonary edema  febrile illness  possible aspiration    SUGGEST:  keep sedated  card management of heart failure  may need decrease in diuretics  Strict I&Os   daily CXR  empiric abx  DTA  procal  no change in vent Mve  O2 to maintain SaO2 >90<94%  echo doppler  feeding   gastritis prophylaxis  DVT/Gastritis prophylaxis

## 2023-05-14 NOTE — PROGRESS NOTE ADULT - ASSESSMENT
CTA Chest 5/13  1. Small bilateral pleural effusions. Lung groundglass opacities, likely representing pulmonary edema. . Cardiomegaly. Correlation for CHF recommended.  2. No evidence of acute pulmonary embolism.    CTH 5/13  No acute intracranial pathology. No evidence of midline shift, mass effect or intracranial hemorrhage.  Air-fluid levels within the bilateral maxillary sinuses, correlate for sinusitis.  Age-indeterminate bilateral nasal bone fractures.    CXR  5/14  Bilateral opacities/pleural effusions, left apical bulla and cardiomegaly, unchanged.    TTE 5/13  1. Diffuse hypokinesis with severe hypokinesis/akinesis of the anterior   wall and resultant severely depressed EF, by visual estimation, of 25 to   30%. The left ventricular diastolic function could not be assessed in   this study due to atrial fibrillation and mitral annuloplasty. Mild   concentric left ventricular hypertrophy.   2. Normal right ventricular size with moderately reduced systolic   function.   3. Severe biatrial dilatation.   4. S/p mitral annuloplasty with mild regurgitation and no evidence of   stenosis (mean PG of 3.0mmHg at 86bpm).   5. No pericardial effusion.   6. A large pleural effusion is noted. 77y F pmh NSCLC stage 3 s/p CRT/surgery 2018, emphysema, severe MR s/p MVR, pAFib s/p coumadin s/p MVR and KOBY closure, past smoker presenting with dyspnea on exertion and cough x 2 weeks admitted to CCU for cardiogenic vs septic shock.     #Cardiogenic/Septic shock   #Suspected aspiration pna   #pAfib RVR  #Severe MR s/p MVR and KOBY closure   - rapid response and upgrade to CCU 5/13 for AMS and hypotension   - bedside echo - severe decreased EF   - TTE 5/13 - diffuse hypokinesis of anterior wall, EF 25-30%  - troponin wnl   - s/p cardizem and lopressor in ER for Afib RVR  - intubated for airway protection   - CTA chest - no PE, b/l pleural effusions and ggo consistent with pulmonary edema   - TSH 7.26   - lactate wnl   - CTH - no acute intracranial pathology   - c/w levophed ggt, off dobutamine, wean as tolerated   - c/w fentanyl/precedex for sedation   - on bumex ggt - holding for now, bedside IVC 1.8 non-collapsing - reassess volume status in PM   - f/u procal, deep tracheal aspirate, Bcx, UA  - wean sedation as tolerated - SAT  - c/w amio ggt at 0.5mg/kg/hr  - c/w lovenox 60mg BID  - c/w cefepime [id number] IV, vancomycin d/c - MRSA -ve    #Transaminitis   - AST 59, ALT 67, , tbili 0.4  - pending RUQ US  - trend LFTs qD    #Anxiety   - c/w sertraline 25mg     # To follow up  - infectious workup for suspected PNA  - rate control on amio ggt  - wean sedation and pressors as tolerated   - assess volume status if diuresis needed  - mixed venous for CO     # Misc  - DVT Prophylaxis: enoxaparin Injectable  - GI Prophylaxis: pantoprazole  Injectable 40 milliGRAM(s) IV Push daily  - Diet: Diet, NPO  - Activity: Activity - Increase As Tolerated  Activity - Bedrest  - Code Status: Full     Suman Sanchez  PGY1, Internal Medicine   [facility]

## 2023-05-14 NOTE — PROGRESS NOTE ADULT - SUBJECTIVE AND OBJECTIVE BOX
Patient is a 77y old  Female who presents with a chief complaint of atrial fib (13 May 2023 19:49)        HPI:  78yo F hx of NSCLC stage 3 s/p CRT and surgery 2018, emphysema, severe mitral valve regurgitation s/p MVR, paroxysmal atrial fibrillation previously on coumadin but stopped after MVR and KOBY closure, former smoker presenting with progressive shortness of breath on exertion, and intermittent dry cough lasting for past 2 weeks. Denies chest pain, palpitations, headaches, wheezing, abdominal pain, fevers, sick contacts, nausea/vomiting. Was prescribed meds from PMD without symptomatic relief. Presented to the ED from home.    At the ED, VS- T: 36.7C, BP: 90/70, HR: 160s, SpO2: 99% on RA, RR: 16. Labs significant for BNP- 6227, and elevated AST/ALT (54/60). Troponin<0.01 x1. EKG showed atrial fibrillation with RVR. CT Chest NC showed unchanged RLL GGO.     Admitted to telemetry for afib with RVR. Given 60mg x1 therapeutic lovenox. 1L LR bolus. For afib with  RVR, was given 20mg IVP diltiazem and 60mg PO. HR improved to 94.  (12 May 2023 23:20)      Pt evaluated on rounds.  I reviewed the radiology tests and hospital record.    I reviewed previous notes on this patient.    Interval Events: No overnight events.          REVIEW OF SYSTEMS:   see HPI      OBJECTIVE:  ICU Vital Signs Last 24 Hrs  T(C): 38.5 (14 May 2023 08:00), Max: 38.7 (14 May 2023 03:00)  T(F): 101.3 (14 May 2023 08:00), Max: 101.7 (14 May 2023 03:00)  HR: 123 (14 May 2023 09:00) (81 - 152)  BP: 99/69 (14 May 2023 08:00) (86/63 - 145/69)  BP(mean): 74 (14 May 2023 08:00) (62 - 97)  ABP: 114/73 (14 May 2023 09:00) (84/48 - 117/72)  ABP(mean): 88 (14 May 2023 09:00) (60 - 88)  RR: 24 (14 May 2023 09:00) (20 - 36)  SpO2: 100% (14 May 2023 09:00) (99% - 100%)    O2 Parameters below as of 14 May 2023 09:00  Patient On (Oxygen Delivery Method): ventilator    O2 Concentration (%): 40      Mode: [place], RR (machine): 24, TV (machine): 300, FiO2: 40, PEEP: 8, ITime: 0.8, MAP: 11, PIP: 23    05-13 @ 07:  -   @ 07:00  --------------------------------------------------------  IN: 2558.5 mL / OUT: 5295 mL / NET: -2736.5 mL     @ 07: @ 10:00  --------------------------------------------------------  IN: 126 mL / OUT: 400 mL / NET: -274 mL      CAPILLARY BLOOD GLUCOSE      POCT Blood Glucose.: 157 mg/dL (13 May 2023 07:45)        PHYSICAL EXAM:       · ENMT:   Airway patent,   Nasal mucosa clear.  Mouth with normal mucosa.   No thrush    · EYES:   Clear bilaterally,   pupils equal,   round and reactive to light.    · CARDIAC:   Normal rate,   regular rhythm.    Heart sounds S1, S2.   No murmurs, no rubs or gallops on auscultation  no edema        CAROTID:   normal systolic impulse  no bruits    · RESPIRATORY:   rales  normal chest expansion  no retractions or use of accessory muscles  percussion of chest demonstrates no hyperresonance or dullness    · GASTROINTESTINAL:  Abdomen soft,   non-tender,   + BS  liver/spleen not palpable    · MUSCULOSKELETAL:   no clubbing, cyanosis      · SKIN:   Skin normal color for race,   warm, dry   No evidence of rash.        · HEME LYMPH:   no splenomegaly.  No cervical  lymphadenopathy.  no inguinal lymphadenopathy    HOSPITAL MEDICATIONS:  MEDICATIONS  (STANDING):  aMIOdarone Infusion 0.5 mG/Min (16.7 mL/Hr) IV Continuous <Continuous>  cefepime   IVPB      cefepime   IVPB 2000 milliGRAM(s) IV Intermittent every 12 hours  chlorhexidine 0.12% Liquid 15 milliLiter(s) Oral Mucosa every 12 hours  chlorhexidine 0.12% Liquid 15 milliLiter(s) Oral Mucosa two times a day  chlorhexidine 2% Cloths 1 Application(s) Topical <User Schedule>  dexMEDEtomidine Infusion 0.1 MICROgram(s)/kG/Hr (1.42 mL/Hr) IV Continuous <Continuous>  enoxaparin Injectable 60 milliGRAM(s) SubCutaneous every 12 hours  fentaNYL   Infusion. 0.5 MICROgram(s)/kG/Hr (2.84 mL/Hr) IV Continuous <Continuous>  ipratropium  for Nebulization. 500 MICROGram(s) Nebulizer once  ipratropium 17 MICROgram(s) HFA Inhaler 1 Puff(s) Inhalation once  norepinephrine Infusion 0.05 MICROgram(s)/kG/Min (5.63 mL/Hr) IV Continuous <Continuous>  pantoprazole  Injectable 40 milliGRAM(s) IV Push daily  sertraline 25 milliGRAM(s) Oral daily    MEDICATIONS  (PRN):    lactated ringers Bolus:   500 milliLiter(s), IV Bolus, once, infuse over 30 Minute(s), Stop After 1 Doses  lactated ringers Bolus:   1000 milliLiter(s), IV Bolus, once, infuse over 60 Minute(s), Stop After 1 Doses  Provider's Contact #: [phone]      LABS:                        14.1   13.48 )-----------( [phone]      ( 14 May 2023 04:15 )             41.7     05-14    139  |  98  |  14  ----------------------------<  134<H>  4.2   |  27  |  1.1    Ca    9.1      14 May 2023 04:15  Phos  4.8     05-13  Mg     2.4     05-14    TPro  6.5  /  Alb  3.8  /  TBili  0.4  /  DBili  x   /  AST  59<H>  /  ALT  67<H>  /  AlkPhos  139<H>  05-14      Urinalysis Basic - ( 14 May 2023 09:00 )    Color: Colorless / Appearance: Clear / S.012 / pH: x  Gluc: x / Ketone: Negative  / Bili: Negative / Urobili: <2 mg/dL   Blood: x / Protein: Trace / Nitrite: Negative   Leuk Esterase: Negative / RBC: x / WBC x   Sq Epi: x / Non Sq Epi: x / Bacteria: x      Arterial Blood Gas:   @ 04:43  7.41/47/161/30/99.6/4.2  ABG lactate: --  Arterial Blood Gas:   @ 08:49  7.42/45/142/29/97.1/4.0  ABG lactate: --  Arterial Blood Gas:   @ 02:47  7.18/46/61/17/86.9/-11.0  ABG lactate: --    Venous Blood Gas:   @ 13:23  7.41/47/51/30/84.7  VBG Lactate: 1.30  Venous Blood Gas:   @ 06:02  7.34/50/61/27/91.0  VBG Lactate: 1.50  Venous Blood Gas:   @ 04:06  7.10/64/68/20/90.2  VBG Lactate: 4.10    Mode: [place], RR (machine): 24, TV (machine): 300, FiO2: 40, PEEP: 8, ITime: 0.8, MAP: 11, PIP: 23  CARDIAC MARKERS ( 13 May 2023 11:00 )  x     / <0.01 ng/mL / x     / x     / x      CARDIAC MARKERS ( 13 May 2023 06:12 )  x     / <0.01 ng/mL / x     / x     / x      CARDIAC MARKERS ( 13 May 2023 02:56 )  x     / <0.01 ng/mL / x     / x     / x      CARDIAC MARKERS ( 12 May 2023 16:10 )  x     / <0.01 ng/mL / x     / x     / x          COVID-19 PCR: NotDetec (12 May 2023 23:21)    Mode: [place]  RR (machine): 24  TV (machine): 300  FiO2: 40  PEEP: 8  ITime: 0.8  MAP: 11  PIP: 23      ABG - ( 14 May 2023 04:43 )  pH, Arterial: 7.41  pH, Blood: x     /  pCO2: 47    /  pO2: 161   / HCO3: 30    / Base Excess: 4.2   /  SaO2: 99.6                RADIOLOGY: Today I personally interpreted the latest CXR and other pertinent films.

## 2023-05-14 NOTE — PROGRESS NOTE ADULT - SUBJECTIVE AND OBJECTIVE BOX
HPI:  76yo F hx of NSCLC stage 3 s/p CRT and surgery 2018, emphysema, severe mitral valve regurgitation s/p MVR, paroxysmal atrial fibrillation previously on coumadin but stopped after MVR and KOBY closure, former smoker presenting with progressive shortness of breath on exertion, and intermittent dry cough lasting for past 2 weeks. Denies chest pain, palpitations, headaches, wheezing, abdominal pain, fevers, sick contacts, nausea/vomiting. Was prescribed meds from PMD without symptomatic relief. Presented to the ED from home.    At the ED, VS- T: 36.7C, BP: 90/70, HR: 160s, SpO2: 99% on RA, RR: 16. Labs significant for BNP- 6227, and elevated AST/ALT (54/60). Troponin<0.01 x1. EKG showed atrial fibrillation with RVR. CT Chest NC showed unchanged RLL GGO.     Admitted to telemetry for afib with RVR. Given 60mg x1 therapeutic lovenox. 1L LR bolus. For afib with  RVR, was given 20mg IVP diltiazem and 60mg PO. HR improved to 94.  (12 May 2023 23:20)    Currently admitted to medicine with the primary diagnosis of Atrial fibrillation       Today is hospital day 2d.     INTERVAL HPI / OVERNIGHT EVENTS:  Patient was examined and seen at bedside. No events overnight, patient currently intubated, sedated on precedex/fentanyl. Patient Afib RVR on amio ggt. Holding feeds, NPO for now. Continues to be febrile, infectious workup pending, on abx.     ROS: Otherwise unremarkable     PAST MEDICAL & SURGICAL HISTORY  HTN (hypertension)    Paroxysmal atrial fibrillation    Non-small cell lung cancer    H/O mitral valve replacement    Severe mitral valve regurgitation      ALLERGIES  No Known Allergies    MEDICATIONS  STANDING MEDICATIONS  aMIOdarone Infusion 0.5 mG/Min IV Continuous <Continuous>  cefepime   IVPB 2000 milliGRAM(s) IV Intermittent every 12 hours  cefepime   IVPB      chlorhexidine 0.12% Liquid 15 milliLiter(s) Oral Mucosa two times a day  chlorhexidine 0.12% Liquid 15 milliLiter(s) Oral Mucosa every 12 hours  chlorhexidine 2% Cloths 1 Application(s) Topical <User Schedule>  dexMEDEtomidine Infusion 0.1 MICROgram(s)/kG/Hr IV Continuous <Continuous>  enoxaparin Injectable 60 milliGRAM(s) SubCutaneous every 12 hours  fentaNYL   Infusion. 0.5 MICROgram(s)/kG/Hr IV Continuous <Continuous>  ipratropium  for Nebulization. 500 MICROGram(s) Nebulizer once  ipratropium 17 MICROgram(s) HFA Inhaler 1 Puff(s) Inhalation once  norepinephrine Infusion 0.05 MICROgram(s)/kG/Min IV Continuous <Continuous>  pantoprazole  Injectable 40 milliGRAM(s) IV Push daily  sertraline 25 milliGRAM(s) Oral daily    PRN MEDICATIONS    VITALS:  T(F): 101.8  HR: 117  BP: 89/62  RR: 24  SpO2: 98%    PHYSICAL EXAM  GEN: NAD, sedated, intubated  PULM: Clear to auscultation bilaterally, No wheezing, rales, rhonchi, intubated 300/8/24/40  CVS: Regular rate and rhythm, S1-S2, no murmurs, heaves, thrills  ABD: Soft, non-tender, non-distended, no guarding, BS +, renee with clear urine, og tube in place   EXT: No edema/cyanosis, extremities warm/dry, peripheral pulses palpable   NEURO: sedated, no response to verbal or tactile stimuli, reflexes intact     LABS                        14.1   13.48 )-----------( 412      ( 14 May 2023 04:15 )             41.7     05-14    139  |  98  |  14  ----------------------------<  134<H>  4.2   |  27  |  1.1    Ca    9.1      14 May 2023 04:15  Phos  4.8     05-13  Mg     2.4     -14    TPro  6.5  /  Alb  3.8  /  TBili  0.4  /  DBili  x   /  AST  59<H>  /  ALT  67<H>  /  AlkPhos  139<H>  -      Urinalysis Basic - ( 14 May 2023 09:00 )    Color: Colorless / Appearance: Clear / S.012 / pH: x  Gluc: x / Ketone: Negative  / Bili: Negative / Urobili: <2 mg/dL   Blood: x / Protein: Trace / Nitrite: Negative   Leuk Esterase: Negative / RBC: x / WBC x   Sq Epi: x / Non Sq Epi: x / Bacteria: x      ABG - ( 14 May 2023 04:43 )  pH, Arterial: 7.41  pH, Blood: x     /  pCO2: 47    /  pO2: 161   / HCO3: 30    / Base Excess: 4.2   /  SaO2: 99.6              Lactate, Blood: 1.3 mmol/L (23 @ 04:15)  Lactate, Blood: 1.0 mmol/L (23 @ 23:44)  Lactate, Blood: 0.9 mmol/L (23 @ 20:27)      Culture - Blood (collected 13 May 2023 02:56)  Source: .Blood Blood  Preliminary Report (14 May 2023 10:02):    No growth to date.      CARDIAC MARKERS ( 13 May 2023 11:00 )  x     / <0.01 ng/mL / x     / x     / x      CARDIAC MARKERS ( 13 May 2023 06:12 )  x     / <0.01 ng/mL / x     / x     / x      CARDIAC MARKERS ( 13 May 2023 02:56 )  x     / <0.01 ng/mL / x     / x     / x      CARDIAC MARKERS ( 12 May 2023 16:10 )  x     / <0.01 ng/mL / x     / x     / x            RADIOLOGY     HPI:  78yo F hx of NSCLC stage 3 s/p CRT and surgery 2018, emphysema, severe mitral valve regurgitation s/p MVR, paroxysmal atrial fibrillation previously on coumadin but stopped after MVR and KOBY closure, former smoker presenting with progressive shortness of breath on exertion, and intermittent dry cough lasting for past 2 weeks. Denies chest pain, palpitations, headaches, wheezing, abdominal pain, fevers, sick contacts, nausea/vomiting. Was prescribed meds from PMD without symptomatic relief. Presented to the ED from home.    At the ED, VS- T: 36.7C, BP: 90/70, HR: 160s, SpO2: 99% on RA, RR: 16. Labs significant for BNP- 6227, and elevated AST/ALT (54/60). Troponin<0.01 x1. EKG showed atrial fibrillation with RVR. CT Chest NC showed unchanged RLL GGO.     Admitted to telemetry for afib with RVR. Given 60mg x1 therapeutic lovenox. 1L LR bolus. For afib with  RVR, was given 20mg IVP diltiazem and 60mg PO. HR improved to 94.  (12 May 2023 23:20)    Currently admitted to medicine with the primary diagnosis of Atrial fibrillation       Today is hospital day 2d.     INTERVAL HPI / OVERNIGHT EVENTS:  Patient was examined and seen at bedside. No events overnight, patient currently intubated, sedated on precedex/fentanyl. Patient Afib RVR on amio ggt. Holding feeds, NPO for now. Continues to be febrile, infectious workup pending, on abx.     ROS: Otherwise unremarkable     PAST MEDICAL & SURGICAL HISTORY  HTN (hypertension)    Paroxysmal atrial fibrillation    Non-small cell lung cancer    H/O mitral valve replacement    Severe mitral valve regurgitation      ALLERGIES  No Known Allergies    MEDICATIONS  STANDING MEDICATIONS  aMIOdarone Infusion 0.5 mG/Min IV Continuous <Continuous>  cefepime   IVPB 2000 milliGRAM(s) IV Intermittent every 12 hours  cefepime   IVPB      chlorhexidine 0.12% Liquid 15 milliLiter(s) Oral Mucosa two times a day  chlorhexidine 0.12% Liquid 15 milliLiter(s) Oral Mucosa every 12 hours  chlorhexidine 2% Cloths 1 Application(s) Topical <User Schedule>  dexMEDEtomidine Infusion 0.1 MICROgram(s)/kG/Hr IV Continuous <Continuous>  enoxaparin Injectable 60 milliGRAM(s) SubCutaneous every 12 hours  fentaNYL   Infusion. 0.5 MICROgram(s)/kG/Hr IV Continuous <Continuous>  ipratropium  for Nebulization. 500 MICROGram(s) Nebulizer once  ipratropium 17 MICROgram(s) HFA Inhaler 1 Puff(s) Inhalation once  norepinephrine Infusion 0.05 MICROgram(s)/kG/Min IV Continuous <Continuous>  pantoprazole  Injectable 40 milliGRAM(s) IV Push daily  sertraline 25 milliGRAM(s) Oral daily    PRN MEDICATIONS    VITALS:  T(F): 101.8  HR: 117  BP: 89/62  RR: 24  SpO2: 98%    PHYSICAL EXAM  GEN: NAD, sedated, intubated  PULM: Clear to auscultation bilaterally, No wheezing, rales, rhonchi, intubated 300/8/24/40  CVS: Regular rate and rhythm, S1-S2, no murmurs, heaves, thrills  ABD: Soft, non-tender, non-distended, no guarding, BS +, renee with clear urine, og tube in place   EXT: No edema/cyanosis, extremities warm/dry, peripheral pulses palpable   NEURO: sedated, no response to verbal or tactile stimuli, reflexes intact     LABS                        14.1   13.48 )-----------( 412      ( 14 May 2023 04:15 )             41.7     05-14    139  |  98  |  14  ----------------------------<  134<H>  4.2   |  27  |  1.1    Ca    9.1      14 May 2023 04:15  Phos  4.8     05-13  Mg     2.4     -14    TPro  6.5  /  Alb  3.8  /  TBili  0.4  /  DBili  x   /  AST  59<H>  /  ALT  67<H>  /  AlkPhos  139<H>  -      Urinalysis Basic - ( 14 May 2023 09:00 )    Color: Colorless / Appearance: Clear / S.012 / pH: x  Gluc: x / Ketone: Negative  / Bili: Negative / Urobili: <2 mg/dL   Blood: x / Protein: Trace / Nitrite: Negative   Leuk Esterase: Negative / RBC: x / WBC x   Sq Epi: x / Non Sq Epi: x / Bacteria: x      ABG - ( 14 May 2023 04:43 )  pH, Arterial: 7.41  pH, Blood: x     /  pCO2: 47    /  pO2: 161   / HCO3: 30    / Base Excess: 4.2   /  SaO2: 99.6              Lactate, Blood: 1.3 mmol/L (23 @ 04:15)  Lactate, Blood: 1.0 mmol/L (23 @ 23:44)  Lactate, Blood: 0.9 mmol/L (23 @ 20:27)      Culture - Blood (collected 13 May 2023 02:56)  Source: .Blood Blood  Preliminary Report (14 May 2023 10:02):    No growth to date.      CARDIAC MARKERS ( 13 May 2023 11:00 )  x     / <0.01 ng/mL / x     / x     / x      CARDIAC MARKERS ( 13 May 2023 06:12 )  x     / <0.01 ng/mL / x     / x     / x      CARDIAC MARKERS ( 13 May 2023 02:56 )  x     / <0.01 ng/mL / x     / x     / x      CARDIAC MARKERS ( 12 May 2023 16:10 )  x     / <0.01 ng/mL / x     / x     / x            RADIOLOGY  CTA Chest   1. Small bilateral pleural effusions. Lung groundglass opacities, likely representing pulmonary edema. . Cardiomegaly. Correlation for CHF recommended.  2. No evidence of acute pulmonary embolism.    CTH   No acute intracranial pathology. No evidence of midline shift, mass effect or intracranial hemorrhage.  Air-fluid levels within the bilateral maxillary sinuses, correlate for sinusitis.  Age-indeterminate bilateral nasal bone fractures.    CXR    Bilateral opacities/pleural effusions, left apical bulla and cardiomegaly, unchanged.    TTE   1. Diffuse hypokinesis with severe hypokinesis/akinesis of the anterior   wall and resultant severely depressed EF, by visual estimation, of 25 to   30%. The left ventricular diastolic function could not be assessed in   this study due to atrial fibrillation and mitral annuloplasty. Mild   concentric left ventricular hypertrophy.   2. Normal right ventricular size with moderately reduced systolic   function.   3. Severe biatrial dilatation.   4. S/p mitral annuloplasty with mild regurgitation and no evidence of   stenosis (mean PG of 3.0mmHg at 86bpm).   5. No pericardial effusion.   6. A large pleural effusion is noted.

## 2023-05-15 LAB
ANION GAP SERPL CALC-SCNC: 12 MMOL/L — SIGNIFICANT CHANGE UP (ref 7–14)
BASE EXCESS BLDA CALC-SCNC: 9 MMOL/L — HIGH (ref -2–3)
BASE EXCESS BLDA CALC-SCNC: 9.7 MMOL/L — HIGH (ref -2–3)
BASOPHILS # BLD AUTO: 0.06 K/UL — SIGNIFICANT CHANGE UP (ref 0–0.2)
BASOPHILS NFR BLD AUTO: 0.4 % — SIGNIFICANT CHANGE UP (ref 0–1)
BUN SERPL-MCNC: 19 MG/DL — SIGNIFICANT CHANGE UP (ref 10–20)
CALCIUM SERPL-MCNC: 9.4 MG/DL — SIGNIFICANT CHANGE UP (ref 8.4–10.5)
CHLORIDE SERPL-SCNC: 95 MMOL/L — LOW (ref 98–110)
CO2 SERPL-SCNC: 33 MMOL/L — HIGH (ref 17–32)
CREAT SERPL-MCNC: 1.1 MG/DL — SIGNIFICANT CHANGE UP (ref 0.7–1.5)
EGFR: 51 ML/MIN/1.73M2 — LOW
EOSINOPHIL # BLD AUTO: 0.03 K/UL — SIGNIFICANT CHANGE UP (ref 0–0.7)
EOSINOPHIL NFR BLD AUTO: 0.2 % — SIGNIFICANT CHANGE UP (ref 0–8)
GAS PNL BLDA: SIGNIFICANT CHANGE UP
GAS PNL BLDA: SIGNIFICANT CHANGE UP
GLUCOSE SERPL-MCNC: 134 MG/DL — HIGH (ref 70–99)
GRAM STN FLD: SIGNIFICANT CHANGE UP
HCO3 BLDA-SCNC: 32 MMOL/L — HIGH (ref 21–28)
HCO3 BLDA-SCNC: 34 MMOL/L — HIGH (ref 21–28)
HCT VFR BLD CALC: 42 % — SIGNIFICANT CHANGE UP (ref 37–47)
HCT VFR BLD CALC: 42.7 % — SIGNIFICANT CHANGE UP (ref 37–47)
HGB BLD-MCNC: 14.1 G/DL — SIGNIFICANT CHANGE UP (ref 12–16)
HGB BLD-MCNC: 14.1 G/DL — SIGNIFICANT CHANGE UP (ref 12–16)
HOROWITZ INDEX BLDA+IHG-RTO: 50 — SIGNIFICANT CHANGE UP
IMM GRANULOCYTES NFR BLD AUTO: 0.4 % — HIGH (ref 0.1–0.3)
LYMPHOCYTES # BLD AUTO: 1.51 K/UL — SIGNIFICANT CHANGE UP (ref 1.2–3.4)
LYMPHOCYTES # BLD AUTO: 11.2 % — LOW (ref 20.5–51.1)
MAGNESIUM SERPL-MCNC: 1.6 MG/DL — LOW (ref 1.8–2.4)
MCHC RBC-ENTMCNC: 30.4 PG — SIGNIFICANT CHANGE UP (ref 27–31)
MCHC RBC-ENTMCNC: 30.7 PG — SIGNIFICANT CHANGE UP (ref 27–31)
MCHC RBC-ENTMCNC: 33 G/DL — SIGNIFICANT CHANGE UP (ref 32–37)
MCHC RBC-ENTMCNC: 33.6 G/DL — SIGNIFICANT CHANGE UP (ref 32–37)
MCV RBC AUTO: 91.3 FL — SIGNIFICANT CHANGE UP (ref 81–99)
MCV RBC AUTO: 92 FL — SIGNIFICANT CHANGE UP (ref 81–99)
MONOCYTES # BLD AUTO: 1.21 K/UL — HIGH (ref 0.1–0.6)
MONOCYTES NFR BLD AUTO: 9 % — SIGNIFICANT CHANGE UP (ref 1.7–9.3)
NEUTROPHILS # BLD AUTO: 10.58 K/UL — HIGH (ref 1.4–6.5)
NEUTROPHILS NFR BLD AUTO: 78.8 % — HIGH (ref 42.2–75.2)
NRBC # BLD: 0 /100 WBCS — SIGNIFICANT CHANGE UP (ref 0–0)
NRBC # BLD: 0 /100 WBCS — SIGNIFICANT CHANGE UP (ref 0–0)
PCO2 BLDA: 34 MMHG — SIGNIFICANT CHANGE UP (ref 25–48)
PCO2 BLDA: 44 MMHG — SIGNIFICANT CHANGE UP (ref 25–48)
PH BLDA: 7.49 — HIGH (ref 7.35–7.45)
PH BLDA: 7.58 — HIGH (ref 7.35–7.45)
PLATELET # BLD AUTO: 366 K/UL — SIGNIFICANT CHANGE UP (ref 130–400)
PLATELET # BLD AUTO: 380 K/UL — SIGNIFICANT CHANGE UP (ref 130–400)
PMV BLD: 9.1 FL — SIGNIFICANT CHANGE UP (ref 7.4–10.4)
PMV BLD: 9.6 FL — SIGNIFICANT CHANGE UP (ref 7.4–10.4)
PO2 BLDA: 151 MMHG — HIGH (ref 83–108)
PO2 BLDA: 185 MMHG — HIGH (ref 83–108)
POTASSIUM SERPL-MCNC: 3.7 MMOL/L — SIGNIFICANT CHANGE UP (ref 3.5–5)
POTASSIUM SERPL-SCNC: 3.7 MMOL/L — SIGNIFICANT CHANGE UP (ref 3.5–5)
PROCALCITONIN SERPL-MCNC: 0.23 NG/ML — HIGH (ref 0.02–0.1)
PROCALCITONIN SERPL-MCNC: 0.26 NG/ML — HIGH (ref 0.02–0.1)
RBC # BLD: 4.6 M/UL — SIGNIFICANT CHANGE UP (ref 4.2–5.4)
RBC # BLD: 4.64 M/UL — SIGNIFICANT CHANGE UP (ref 4.2–5.4)
RBC # FLD: 13.7 % — SIGNIFICANT CHANGE UP (ref 11.5–14.5)
RBC # FLD: 14 % — SIGNIFICANT CHANGE UP (ref 11.5–14.5)
SAO2 % BLDA: 99.4 % — HIGH (ref 94–98)
SAO2 % BLDA: 99.7 % — HIGH (ref 94–98)
SODIUM SERPL-SCNC: 140 MMOL/L — SIGNIFICANT CHANGE UP (ref 135–146)
SPECIMEN SOURCE: SIGNIFICANT CHANGE UP
TROPONIN T SERPL-MCNC: <0.01 NG/ML — SIGNIFICANT CHANGE UP
WBC # BLD: 13.45 K/UL — HIGH (ref 4.8–10.8)
WBC # BLD: 13.65 K/UL — HIGH (ref 4.8–10.8)
WBC # FLD AUTO: 13.45 K/UL — HIGH (ref 4.8–10.8)
WBC # FLD AUTO: 13.65 K/UL — HIGH (ref 4.8–10.8)

## 2023-05-15 PROCEDURE — 99291 CRITICAL CARE FIRST HOUR: CPT

## 2023-05-15 PROCEDURE — 93010 ELECTROCARDIOGRAM REPORT: CPT

## 2023-05-15 RX ORDER — ENOXAPARIN SODIUM 100 MG/ML
60 INJECTION SUBCUTANEOUS EVERY 12 HOURS
Refills: 0 | Status: DISCONTINUED | OUTPATIENT
Start: 2023-05-15 | End: 2023-05-23

## 2023-05-15 RX ORDER — POTASSIUM CHLORIDE 20 MEQ
20 PACKET (EA) ORAL
Refills: 0 | Status: COMPLETED | OUTPATIENT
Start: 2023-05-15 | End: 2023-05-15

## 2023-05-15 RX ORDER — HEPARIN SODIUM 5000 [USP'U]/ML
INJECTION INTRAVENOUS; SUBCUTANEOUS
Qty: 25000 | Refills: 0 | Status: DISCONTINUED | OUTPATIENT
Start: 2023-05-15 | End: 2023-05-15

## 2023-05-15 RX ORDER — MAGNESIUM SULFATE 500 MG/ML
1 VIAL (ML) INJECTION ONCE
Refills: 0 | Status: COMPLETED | OUTPATIENT
Start: 2023-05-15 | End: 2023-05-15

## 2023-05-15 RX ORDER — HEPARIN SODIUM 5000 [USP'U]/ML
2000 INJECTION INTRAVENOUS; SUBCUTANEOUS EVERY 6 HOURS
Refills: 0 | Status: DISCONTINUED | OUTPATIENT
Start: 2023-05-15 | End: 2023-05-15

## 2023-05-15 RX ORDER — HEPARIN SODIUM 5000 [USP'U]/ML
4500 INJECTION INTRAVENOUS; SUBCUTANEOUS ONCE
Refills: 0 | Status: DISCONTINUED | OUTPATIENT
Start: 2023-05-15 | End: 2023-05-15

## 2023-05-15 RX ORDER — POTASSIUM CHLORIDE 20 MEQ
10 PACKET (EA) ORAL ONCE
Refills: 0 | Status: DISCONTINUED | OUTPATIENT
Start: 2023-05-15 | End: 2023-05-15

## 2023-05-15 RX ORDER — AMIODARONE HYDROCHLORIDE 400 MG/1
0.5 TABLET ORAL
Qty: 450 | Refills: 0 | Status: DISCONTINUED | OUTPATIENT
Start: 2023-05-15 | End: 2023-05-17

## 2023-05-15 RX ORDER — BUMETANIDE 0.25 MG/ML
2 INJECTION INTRAMUSCULAR; INTRAVENOUS ONCE
Refills: 0 | Status: COMPLETED | OUTPATIENT
Start: 2023-05-15 | End: 2023-05-15

## 2023-05-15 RX ORDER — ENOXAPARIN SODIUM 100 MG/ML
40 INJECTION SUBCUTANEOUS EVERY 24 HOURS
Refills: 0 | Status: DISCONTINUED | OUTPATIENT
Start: 2023-05-15 | End: 2023-05-15

## 2023-05-15 RX ORDER — HEPARIN SODIUM 5000 [USP'U]/ML
4500 INJECTION INTRAVENOUS; SUBCUTANEOUS EVERY 6 HOURS
Refills: 0 | Status: DISCONTINUED | OUTPATIENT
Start: 2023-05-15 | End: 2023-05-15

## 2023-05-15 RX ORDER — MAGNESIUM SULFATE 500 MG/ML
2 VIAL (ML) INJECTION ONCE
Refills: 0 | Status: COMPLETED | OUTPATIENT
Start: 2023-05-15 | End: 2023-05-15

## 2023-05-15 RX ADMIN — Medication 50 MILLIEQUIVALENT(S): at 19:00

## 2023-05-15 RX ADMIN — ENOXAPARIN SODIUM 60 MILLIGRAM(S): 100 INJECTION SUBCUTANEOUS at 17:24

## 2023-05-15 RX ADMIN — CEFEPIME 100 MILLIGRAM(S): 1 INJECTION, POWDER, FOR SOLUTION INTRAMUSCULAR; INTRAVENOUS at 17:25

## 2023-05-15 RX ADMIN — AMIODARONE HYDROCHLORIDE 16.7 MG/MIN: 400 TABLET ORAL at 15:59

## 2023-05-15 RX ADMIN — FENTANYL CITRATE 2.84 MICROGRAM(S)/KG/HR: 50 INJECTION INTRAVENOUS at 04:10

## 2023-05-15 RX ADMIN — Medication 50 MILLIEQUIVALENT(S): at 16:00

## 2023-05-15 RX ADMIN — Medication 5.63 MICROGRAM(S)/KG/MIN: at 04:10

## 2023-05-15 RX ADMIN — SERTRALINE 25 MILLIGRAM(S): 25 TABLET, FILM COATED ORAL at 11:12

## 2023-05-15 RX ADMIN — CHLORHEXIDINE GLUCONATE 1 APPLICATION(S): 213 SOLUTION TOPICAL at 05:05

## 2023-05-15 RX ADMIN — BUMETANIDE 2 MILLIGRAM(S): 0.25 INJECTION INTRAMUSCULAR; INTRAVENOUS at 07:11

## 2023-05-15 RX ADMIN — Medication 25 GRAM(S): at 06:34

## 2023-05-15 RX ADMIN — PANTOPRAZOLE SODIUM 40 MILLIGRAM(S): 20 TABLET, DELAYED RELEASE ORAL at 11:11

## 2023-05-15 RX ADMIN — CHLORHEXIDINE GLUCONATE 15 MILLILITER(S): 213 SOLUTION TOPICAL at 05:03

## 2023-05-15 RX ADMIN — Medication 50 MILLIEQUIVALENT(S): at 17:24

## 2023-05-15 RX ADMIN — Medication 100 GRAM(S): at 08:34

## 2023-05-15 RX ADMIN — CEFEPIME 100 MILLIGRAM(S): 1 INJECTION, POWDER, FOR SOLUTION INTRAMUSCULAR; INTRAVENOUS at 05:03

## 2023-05-15 RX ADMIN — CHLORHEXIDINE GLUCONATE 15 MILLILITER(S): 213 SOLUTION TOPICAL at 17:25

## 2023-05-15 RX ADMIN — ENOXAPARIN SODIUM 40 MILLIGRAM(S): 100 INJECTION SUBCUTANEOUS at 11:12

## 2023-05-15 NOTE — DIETITIAN INITIAL EVALUATION ADULT - NSFNSGIIOFT_GEN_A_CORE
BMI calculated using latest daily wt 54.5 kg.    Daily wts this admit:  60 kg (5/13), 53.9 kg (5/14), 54.5 kg (5/15)    Wt change observed possibly related to fluid shifts; will monitor wt trends and attempt to clarify UBW when able to obtain nutrition hx.    IBW: 50 kg.

## 2023-05-15 NOTE — CONSULT NOTE ADULT - ASSESSMENT
ASSESSMENT  78yo F hx of NSCLC stage 3 s/p CRT and surgery 2018, emphysema, severe mitral valve regurgitation s/p MVR, paroxysmal atrial fibrillation previously on coumadin but stopped after MVR and KOBY closure, former smoker presenting with progressive shortness of breath on exertion, and intermittent dry cough lasting for past 2 weeks.    IMPRESSION  #Acute Hypoxemic failure s/p intubation   - CT Angio Chest PE Protocol w/ IV Cont (05.13.23 @ 05:43): IMPRESSION: 1. Small bilateral pleural effusions. Lung groundglass opacities, likely  representing pulmonary edema. . Cardiomegaly. Correlation for CHF  recommended. 2. No evidence of acutepulmonary embolism.    #Fevers  - Blood Cx 5/13 NG  - Trach Cx 5/13 NG  - MRSA nares negative  - WBC Count: 16.62 K/uL (05.13.23 @ 06:12)  - Procalcitonin, Serum: 0.26 (05.15.23 @ 04:15)    #Obesity BMI (kg/m2): 24.2  #DM   #Abx allergy: No Known Allergies      RECOMMENDATIONS  This is a preliminary incomplete pended note, all final recommendations to follow after interview and examination of the patient.    Please call or message on [employer] Teams if with any questions.  Spectra [phone]       ASSESSMENT  78yo F hx of NSCLC stage 3 s/p CRT and surgery 2018, emphysema, severe mitral valve regurgitation s/p MVR, paroxysmal atrial fibrillation previously on coumadin but stopped after MVR and KOBY closure, former smoker presenting with progressive shortness of breath on exertion, and intermittent dry cough lasting for past 2 weeks.    IMPRESSION  #Acute Hypoxemic failure s/p intubation   - CT Angio Chest PE Protocol w/ IV Cont (05.13.23 @ 05:43): IMPRESSION: 1. Small bilateral pleural effusions. Lung groundglass opacities, likely  representing pulmonary edema. . Cardiomegaly. Correlation for CHF  recommended. 2. No evidence of acutepulmonary embolism.    #Fevers - possible viral infection   - Blood Cx 5/13 NG  - Trach Cx 5/13 NG  - MRSA nares negative  - WBC Count: 16.62 K/uL (05.13.23 @ 06:12)  - Procalcitonin, Serum: 0.26 (05.15.23 @ 04:15)    #Obesity BMI (kg/m2): 24.2  #DM   #Abx allergy: No Known Allergies      RECOMMENDATIONS  - agree with RVP for possible viral illness prior to admission  - continue cefepime for now while awaiting for tracheal aspirate cx to finalize   - will plan on stopping antibiotics if RVP is positive   - HFNC - wean O2 as tolerated    Please call or message on [employer] Teams if with any questions.  Spectra [phone]

## 2023-05-15 NOTE — DIETITIAN INITIAL EVALUATION ADULT - ORAL INTAKE PTA/DIET HISTORY
Pt confused at time of RD visit following recent extubation and unable to participate in nutrition interview at this time. No family at bedside and no response from contact number listed in chart. Unable to obtain nutrition hx at this time. No signs of significant muscle wasting/subcutaneous fat loss observed at this time.

## 2023-05-15 NOTE — DIETITIAN INITIAL EVALUATION ADULT - OTHER INFO
Pertinent Medical Information: Pt presented w/ progressive SOB on exertion x2 weeks. Cardiogenic/Septic shock noted. Bilateral pleural effusions. NPO for possible Cardioversion today. Acute hypoxemic respiratory failure - pt was intubated earlier this admit, now extubated and currently on 4L NC as of today.    PMH includes NSCLC stage 3 s/p CRT and surgery 2018, emphysema, severe mitral valve regurgitation s/p MVR, paroxysmal atrial fibrillation previously on coumadin but stopped after MVR and KOBY closure, former smoker.

## 2023-05-15 NOTE — PROGRESS NOTE ADULT - SUBJECTIVE AND OBJECTIVE BOX
Patient is a 77y old  Female who presents with a chief complaint of atrial fib (15 May 2023 07:52)        Over Night Events:    Intubated, sedated   Febrile to 101.8       ROS:  See HPI    PHYSICAL EXAM    ICU Vital Signs Last 24 Hrs  T(C): 36.4 (15 May 2023 08:00), Max: 38.8 (14 May 2023 12:00)  T(F): 97.5 (15 May 2023 08:00), Max: 101.8 (14 May 2023 12:00)  HR: 114 (15 May 2023 09:00) (103 - 122)  BP: 107/56 (15 May 2023 08:00) (81/56 - 127/58)  BP(mean): 78 (15 May 2023 08:00) (64 - 85)  ABP: 104/61 (15 May 2023 09:00) (86/52 - 118/79)  ABP(mean): 76 (15 May 2023 09:00) (64 - 93)  RR: 29 (15 May 2023 09:00) (24 - 39)  SpO2: 100% (15 May 2023 09:00) (98% - 100%)    O2 Parameters below as of 15 May 2023 09:00  Patient On (Oxygen Delivery Method): ventilator    O2 Concentration (%): 40        General: intubated, sedated  HEENT: STARR             Lymphatic system: No cervical LN   Lungs: Bilateral BS, coarse  Cardiovascular: Regular   Gastrointestinal: Soft, Positive BS  Extremities: No clubbing.  Moves extremities.  Full Range of motion   Skin: Warm, intact  Neurological: sedated     23 @ 07:01  -  05-15-23 @ 07:00  --------------------------------------------------------  IN:    Amiodarone: 33.3 mL    Amiodarone: 384.1 mL    Dexmedetomidine: 493.5 mL    FentaNYL: 315 mL    IV PiggyBack: 500 mL    Norepinephrine: 161.1 mL  Total IN: 1887 mL    OUT:    Indwelling Catheter - Urethral (mL): 3690 mL  Total OUT: 3690 mL    Total NET: -1803 mL      05-15-23 @ 07:01  -  05-15-23 @ 09:55  --------------------------------------------------------  IN:    Amiodarone: 50.1 mL    Dexmedetomidine: 67.5 mL    FentaNYL: 56 mL    IV PiggyBack: 100 mL    Norepinephrine: 9.5 mL  Total IN: 283.1 mL    OUT:    Indwelling Catheter - Urethral (mL): 545 mL  Total OUT: 545 mL    Total NET: -261.9 mL          LABS:                            14.1   13.45 )-----------( 366      ( 15 May 2023 04:15 )             42.0                                               05-15    140  |  95<L>  |  19  ----------------------------<  134<H>  3.7   |  33<H>  |  1.1    Ca    9.4      15 May 2023 04:15  Mg     1.6     05-15    TPro  6.5  /  Alb  3.8  /  TBili  0.4  /  DBili  x   /  AST  59<H>  /  ALT  67<H>  /  AlkPhos  139<H>                                               Urinalysis Basic - ( 14 May 2023 09:00 )    Color: Colorless / Appearance: Clear / S.012 / pH: x  Gluc: x / Ketone: Negative  / Bili: Negative / Urobili: <2 mg/dL   Blood: x / Protein: Trace / Nitrite: Negative   Leuk Esterase: Negative / RBC: x / WBC x   Sq Epi: x / Non Sq Epi: x / Bacteria: x        CARDIAC MARKERS ( 13 May 2023 11:00 )  x     / <0.01 ng/mL / x     / x     / x                                                LIVER FUNCTIONS - ( 14 May 2023 04:15 )  Alb: 3.8 g/dL / Pro: 6.5 g/dL / ALK PHOS: 139 U/L / ALT: 67 U/L / AST: 59 U/L / GGT: x                                                  Culture - Sputum (collected 14 May 2023 14:18)  Source: Trach Asp Tracheal Aspirate  Gram Stain (15 May 2023 03:46):    Moderate polymorphonuclear leukocytes per low power field    No Squamous epithelial cells per low power field    No organisms seen per oil power field    Culture - Blood (collected 13 May 2023 02:56)  Source: .Blood Blood  Preliminary Report (14 May 2023 10:02):    No growth to date.                                                   Mode: AC/ CMV (Assist Control/ Continuous Mandatory Ventilation)  RR (machine): 24  TV (machine): 300  FiO2: 40  PEEP: 8  ITime: 0.8  MAP: 11  PIP: 18                                      ABG - ( 15 May 2023 03:32 )  pH, Arterial: 7.49  pH, Blood: x     /  pCO2: 44    /  pO2: 151   / HCO3: 34    / Base Excess: 9.0   /  SaO2: 99.7                MEDICATIONS  (STANDING):  aMIOdarone Infusion 0.5 mG/Min (16.7 mL/Hr) IV Continuous <Continuous>  cefepime   IVPB 2000 milliGRAM(s) IV Intermittent every 12 hours  cefepime   IVPB      chlorhexidine 0.12% Liquid 15 milliLiter(s) Oral Mucosa two times a day  chlorhexidine 0.12% Liquid 15 milliLiter(s) Oral Mucosa every 12 hours  chlorhexidine 2% Cloths 1 Application(s) Topical <User Schedule>  dexMEDEtomidine Infusion 0.1 MICROgram(s)/kG/Hr (1.42 mL/Hr) IV Continuous <Continuous>  enoxaparin Injectable 40 milliGRAM(s) SubCutaneous every 24 hours  fentaNYL   Infusion. 0.5 MICROgram(s)/kG/Hr (2.84 mL/Hr) IV Continuous <Continuous>  ipratropium  for Nebulization. 500 MICROGram(s) Nebulizer once  ipratropium 17 MICROgram(s) HFA Inhaler 1 Puff(s) Inhalation once  norepinephrine Infusion 0.05 MICROgram(s)/kG/Min (5.63 mL/Hr) IV Continuous <Continuous>  pantoprazole  Injectable 40 milliGRAM(s) IV Push daily  potassium chloride  10 mEq/50 mL IVPB 10 milliEquivalent(s) IV Intermittent once  sertraline 25 milliGRAM(s) Oral daily    MEDICATIONS  (PRN):      Xrays: ETT ok; effusions; bullous disease                                                                                    ECHO

## 2023-05-15 NOTE — CHART NOTE - NSCHARTNOTEFT_GEN_A_CORE
Spoke with sister Micheal at bedside as well as son Per on phone. Micheal able to be reached at [phone] if needed. They are all unaware of her Afib status, but report that she has not seen a cardiologist for past 3 years. Will continue to monitor and reassess.

## 2023-05-15 NOTE — PROGRESS NOTE ADULT - SUBJECTIVE AND OBJECTIVE BOX
Patient is a 77y old  Female who presents with a chief complaint of atrial fib (14 May 2023 13:03)      INTERVAL HPI/OVERNIGHT EVENTS:   Overnight, pt had an episode of hematochezia, lovenox held. Bumex given. TMax 101.8, on Cefepime. Hemodynamically stable     Subjective: Patient seen and examined at bedside.    ICU Vital Signs Last 24 Hrs  T(C): 37.1 (15 May 2023 02:00), Max: 38.8 (14 May 2023 12:00)  T(F): 98.8 (15 May 2023 02:00), Max: 101.8 (14 May 2023 12:00)  HR: 120 (15 May 2023 07:00) (103 - 123)  BP: 117/59 (15 May 2023 06:00) (81/56 - 127/58)  BP(mean): 85 (15 May 2023 06:00) (64 - 85)  ABP: 101/64 (15 May 2023 07:00) (86/52 - 118/79)  ABP(mean): 78 (15 May 2023 07:00) (64 - 93)  RR: 24 (15 May 2023 07:00) (24 - 39)  SpO2: 100% (15 May 2023 07:00) (98% - 100%)    O2 Parameters below as of 15 May 2023 07:00      O2 Concentration (%): 40      I&O's Summary    14 May 2023 07:01  -  15 May 2023 07:00  --------------------------------------------------------  IN: 1887 mL / OUT: 3690 mL / NET: -1803 mL      Mode: AC/ CMV (Assist Control/ Continuous Mandatory Ventilation)  RR (machine): 24  TV (machine): 300  FiO2: 40  PEEP: 8  ITime: 0.8  MAP: 10  PIP: 17      PHYSICAL EXAM:  GENERAL: No acute distress   HEAD:  Atraumatic, Normocephalic  EYES: EOMI, PERRLA, conjunctiva and sclera clear  ENMT: No tonsillar erythema, exudates, or enlargement; Moist mucous membranes  NECK: Supple, No JVD, Normal thyroid  HEART: irregular rate and rythm No murmurs, rubs, or gallops  RESPIRATORY: intubated  NEUROLOGY: follows commands  EXTREMITIES:  2+ Peripheral Pulses, No clubbing, cyanosis, or edema  SKIN: warm, dry, normal color, no rash or abnormal lesions    LABS:                        14.1   13.45 )-----------( 366      ( 15 May 2023 04:15 )             42.0     05-15    140  |  95<L>  |  19  ----------------------------<  134<H>  3.7   |  33<H>  |  1.1    Ca    9.4      15 May 2023 04:15  Mg     1.6     05-15    TPro  6.5  /  Alb  3.8  /  TBili  0.4  /  DBili  x   /  AST  59<H>  /  ALT  67<H>  /  AlkPhos  139<H>  05-14      Urinalysis Basic - ( 14 May 2023 09:00 )    Color: Colorless / Appearance: Clear / S.012 / pH: x  Gluc: x / Ketone: Negative  / Bili: Negative / Urobili: <2 mg/dL   Blood: x / Protein: Trace / Nitrite: Negative   Leuk Esterase: Negative / RBC: x / WBC x   Sq Epi: x / Non Sq Epi: x / Bacteria: x      CAPILLARY BLOOD GLUCOSE        ABG - ( 15 May 2023 03:32 )  pH, Arterial: 7.49  pH, Blood: x     /  pCO2: 44    /  pO2: 151   / HCO3: 34    / Base Excess: 9.0   /  SaO2: 99.7                Consultant(s) Notes Reviewed:  [x ] YES  [ ] NO    MEDICATIONS  (STANDING):  aMIOdarone Infusion 0.5 mG/Min (16.7 mL/Hr) IV Continuous <Continuous>  cefepime   IVPB      cefepime   IVPB 2000 milliGRAM(s) IV Intermittent every 12 hours  chlorhexidine 0.12% Liquid 15 milliLiter(s) Oral Mucosa every 12 hours  chlorhexidine 0.12% Liquid 15 milliLiter(s) Oral Mucosa two times a day  chlorhexidine 2% Cloths 1 Application(s) Topical <User Schedule>  dexMEDEtomidine Infusion 0.1 MICROgram(s)/kG/Hr (1.42 mL/Hr) IV Continuous <Continuous>  enoxaparin Injectable 40 milliGRAM(s) SubCutaneous every 24 hours  fentaNYL   Infusion. 0.5 MICROgram(s)/kG/Hr (2.84 mL/Hr) IV Continuous <Continuous>  ipratropium  for Nebulization. 500 MICROGram(s) Nebulizer once  ipratropium 17 MICROgram(s) HFA Inhaler 1 Puff(s) Inhalation once  magnesium sulfate  IVPB 1 Gram(s) IV Intermittent once  norepinephrine Infusion 0.05 MICROgram(s)/kG/Min (5.63 mL/Hr) IV Continuous <Continuous>  pantoprazole  Injectable 40 milliGRAM(s) IV Push daily  potassium chloride  10 mEq/50 mL IVPB 10 milliEquivalent(s) IV Intermittent once  sertraline 25 milliGRAM(s) Oral daily    MEDICATIONS  (PRN):      Care Discussed with Consultants/Other Providers [ x] YES  [ ] NO    RADIOLOGY & ADDITIONAL TESTS: Patient is a 77y old  Female who presents with a chief complaint of atrial fib (14 May 2023 13:03)      INTERVAL HPI/OVERNIGHT EVENTS:   Overnight, pt had an episode of hematochezia, therapeautic lovenox held. Bumex given. TMax 101.8, on Cefepime. Hemodynamically stable     Subjective: Patient seen and examined at bedside.    ICU Vital Signs Last 24 Hrs  T(C): 37.1 (15 May 2023 02:00), Max: 38.8 (14 May 2023 12:00)  T(F): 98.8 (15 May 2023 02:00), Max: 101.8 (14 May 2023 12:00)  HR: 120 (15 May 2023 07:00) (103 - 123)  BP: 117/59 (15 May 2023 06:00) (81/56 - 127/58)  BP(mean): 85 (15 May 2023 06:00) (64 - 85)  ABP: 101/64 (15 May 2023 07:00) (86/52 - 118/79)  ABP(mean): 78 (15 May 2023 07:00) (64 - 93)  RR: 24 (15 May 2023 07:00) (24 - 39)  SpO2: 100% (15 May 2023 07:00) (98% - 100%)    O2 Parameters below as of 15 May 2023 07:00      O2 Concentration (%): 40      I&O's Summary    14 May 2023 07:01  -  15 May 2023 07:00  --------------------------------------------------------  IN: 1887 mL / OUT: 3690 mL / NET: -1803 mL      Mode: AC/ CMV (Assist Control/ Continuous Mandatory Ventilation)  RR (machine): 24  TV (machine): 300  FiO2: 40  PEEP: 8  ITime: 0.8  MAP: 10  PIP: 17      PHYSICAL EXAM:  GENERAL: No acute distress   HEAD:  Atraumatic, Normocephalic  EYES: EOMI, PERRLA, conjunctiva and sclera clear  ENMT: No tonsillar erythema, exudates, or enlargement; Moist mucous membranes  NECK: Supple, No JVD, Normal thyroid  HEART: irregular rate and rythm No murmurs, rubs, or gallops  RESPIRATORY: intubated  NEUROLOGY: follows commands  EXTREMITIES:  2+ Peripheral Pulses, No clubbing, cyanosis, or edema  SKIN: warm, dry, normal color, no rash or abnormal lesions    LABS:                        14.1   13.45 )-----------( 366      ( 15 May 2023 04:15 )             42.0     05-15    140  |  95<L>  |  19  ----------------------------<  134<H>  3.7   |  33<H>  |  1.1    Ca    9.4      15 May 2023 04:15  Mg     1.6     05-15    TPro  6.5  /  Alb  3.8  /  TBili  0.4  /  DBili  x   /  AST  59<H>  /  ALT  67<H>  /  AlkPhos  139<H>  05-14      Urinalysis Basic - ( 14 May 2023 09:00 )    Color: Colorless / Appearance: Clear / S.012 / pH: x  Gluc: x / Ketone: Negative  / Bili: Negative / Urobili: <2 mg/dL   Blood: x / Protein: Trace / Nitrite: Negative   Leuk Esterase: Negative / RBC: x / WBC x   Sq Epi: x / Non Sq Epi: x / Bacteria: x      CAPILLARY BLOOD GLUCOSE        ABG - ( 15 May 2023 03:32 )  pH, Arterial: 7.49  pH, Blood: x     /  pCO2: 44    /  pO2: 151   / HCO3: 34    / Base Excess: 9.0   /  SaO2: 99.7                Consultant(s) Notes Reviewed:  [x ] YES  [ ] NO    MEDICATIONS  (STANDING):  aMIOdarone Infusion 0.5 mG/Min (16.7 mL/Hr) IV Continuous <Continuous>  cefepime   IVPB      cefepime   IVPB 2000 milliGRAM(s) IV Intermittent every 12 hours  chlorhexidine 0.12% Liquid 15 milliLiter(s) Oral Mucosa every 12 hours  chlorhexidine 0.12% Liquid 15 milliLiter(s) Oral Mucosa two times a day  chlorhexidine 2% Cloths 1 Application(s) Topical <User Schedule>  dexMEDEtomidine Infusion 0.1 MICROgram(s)/kG/Hr (1.42 mL/Hr) IV Continuous <Continuous>  enoxaparin Injectable 40 milliGRAM(s) SubCutaneous every 24 hours  fentaNYL   Infusion. 0.5 MICROgram(s)/kG/Hr (2.84 mL/Hr) IV Continuous <Continuous>  ipratropium  for Nebulization. 500 MICROGram(s) Nebulizer once  ipratropium 17 MICROgram(s) HFA Inhaler 1 Puff(s) Inhalation once  magnesium sulfate  IVPB 1 Gram(s) IV Intermittent once  norepinephrine Infusion 0.05 MICROgram(s)/kG/Min (5.63 mL/Hr) IV Continuous <Continuous>  pantoprazole  Injectable 40 milliGRAM(s) IV Push daily  potassium chloride  10 mEq/50 mL IVPB 10 milliEquivalent(s) IV Intermittent once  sertraline 25 milliGRAM(s) Oral daily    MEDICATIONS  (PRN):      Care Discussed with Consultants/Other Providers [ x] YES  [ ] NO    RADIOLOGY & ADDITIONAL TESTS: Patient is a 77y old  Female who presents with a chief complaint of atrial fib (14 May 2023 13:03)      INTERVAL HPI/OVERNIGHT EVENTS:   Overnight, pt had an episode of hematochezia, therapeutic Lovenox held. Bumex given. TMax 101.8, on Cefepime. Hemodynamically stable     Subjective: Patient seen and examined at bedside.    ICU Vital Signs Last 24 Hrs  T(C): 37.1 (15 May 2023 02:00), Max: 38.8 (14 May 2023 12:00)  T(F): 98.8 (15 May 2023 02:00), Max: 101.8 (14 May 2023 12:00)  HR: 120 (15 May 2023 07:00) (103 - 123)  BP: 117/59 (15 May 2023 06:00) (81/56 - 127/58)  BP(mean): 85 (15 May 2023 06:00) (64 - 85)  ABP: 101/64 (15 May 2023 07:00) (86/52 - 118/79)  ABP(mean): 78 (15 May 2023 07:00) (64 - 93)  RR: 24 (15 May 2023 07:00) (24 - 39)  SpO2: 100% (15 May 2023 07:00) (98% - 100%)    O2 Parameters below as of 15 May 2023 07:00      O2 Concentration (%): 40      I&O's Summary    14 May 2023 07:01  -  15 May 2023 07:00  --------------------------------------------------------  IN: 1887 mL / OUT: 3690 mL / NET: -1803 mL      Mode: AC/ CMV (Assist Control/ Continuous Mandatory Ventilation)  RR (machine): 24  TV (machine): 300  FiO2: 40  PEEP: 8  ITime: 0.8  MAP: 10  PIP: 17      PHYSICAL EXAM:  GENERAL: No acute distress   HEAD:  Atraumatic, Normocephalic  EYES: EOMI, PERRLA, conjunctiva and sclera clear  ENMT: No tonsillar erythema, exudates, or enlargement; Moist mucous membranes  NECK: Supple, No JVD, Normal thyroid  HEART: irregular rate and rythm No murmurs, rubs, or gallops  RESPIRATORY: intubated  NEUROLOGY: follows commands  EXTREMITIES:  2+ Peripheral Pulses, No clubbing, cyanosis, or edema  SKIN: warm, dry, normal color, no rash or abnormal lesions    LABS:                        14.1   13.45 )-----------( 366      ( 15 May 2023 04:15 )             42.0     05-15    140  |  95<L>  |  19  ----------------------------<  134<H>  3.7   |  33<H>  |  1.1    Ca    9.4      15 May 2023 04:15  Mg     1.6     05-15    TPro  6.5  /  Alb  3.8  /  TBili  0.4  /  DBili  x   /  AST  59<H>  /  ALT  67<H>  /  AlkPhos  139<H>  05-14      Urinalysis Basic - ( 14 May 2023 09:00 )    Color: Colorless / Appearance: Clear / S.012 / pH: x  Gluc: x / Ketone: Negative  / Bili: Negative / Urobili: <2 mg/dL   Blood: x / Protein: Trace / Nitrite: Negative   Leuk Esterase: Negative / RBC: x / WBC x   Sq Epi: x / Non Sq Epi: x / Bacteria: x      CAPILLARY BLOOD GLUCOSE        ABG - ( 15 May 2023 03:32 )  pH, Arterial: 7.49  pH, Blood: x     /  pCO2: 44    /  pO2: 151   / HCO3: 34    / Base Excess: 9.0   /  SaO2: 99.7                Consultant(s) Notes Reviewed:  [x ] YES  [ ] NO    MEDICATIONS  (STANDING):  aMIOdarone Infusion 0.5 mG/Min (16.7 mL/Hr) IV Continuous <Continuous>  cefepime   IVPB      cefepime   IVPB 2000 milliGRAM(s) IV Intermittent every 12 hours  chlorhexidine 0.12% Liquid 15 milliLiter(s) Oral Mucosa every 12 hours  chlorhexidine 0.12% Liquid 15 milliLiter(s) Oral Mucosa two times a day  chlorhexidine 2% Cloths 1 Application(s) Topical <User Schedule>  dexMEDEtomidine Infusion 0.1 MICROgram(s)/kG/Hr (1.42 mL/Hr) IV Continuous <Continuous>  enoxaparin Injectable 40 milliGRAM(s) SubCutaneous every 24 hours  fentaNYL   Infusion. 0.5 MICROgram(s)/kG/Hr (2.84 mL/Hr) IV Continuous <Continuous>  ipratropium  for Nebulization. 500 MICROGram(s) Nebulizer once  ipratropium 17 MICROgram(s) HFA Inhaler 1 Puff(s) Inhalation once  magnesium sulfate  IVPB 1 Gram(s) IV Intermittent once  norepinephrine Infusion 0.05 MICROgram(s)/kG/Min (5.63 mL/Hr) IV Continuous <Continuous>  pantoprazole  Injectable 40 milliGRAM(s) IV Push daily  potassium chloride  10 mEq/50 mL IVPB 10 milliEquivalent(s) IV Intermittent once  sertraline 25 milliGRAM(s) Oral daily    MEDICATIONS  (PRN):      Care Discussed with Consultants/Other Providers [ x] YES  [ ] NO    RADIOLOGY & ADDITIONAL TESTS:

## 2023-05-15 NOTE — DIETITIAN INITIAL EVALUATION ADULT - PERTINENT LABORATORY DATA
05-15    140  |  95<L>  |  19  ----------------------------<  134<H>  3.7   |  33<H>  |  1.1    Ca    9.4      15 May 2023 04:15  Mg     1.6     05-15    TPro  6.5  /  Alb  3.8  /  TBili  0.4  /  DBili  x   /  AST  59<H>  /  ALT  67<H>  /  AlkPhos  139<H>  05-14

## 2023-05-15 NOTE — DIETITIAN INITIAL EVALUATION ADULT - PERTINENT MEDS FT
MEDICATIONS  (STANDING):  aMIOdarone Infusion 0.5 mG/Min (16.7 mL/Hr) IV Continuous <Continuous>  aMIOdarone Infusion 0.501 mG/Min (16.7 mL/Hr) IV Continuous <Continuous>  cefepime   IVPB      cefepime   IVPB 2000 milliGRAM(s) IV Intermittent every 12 hours  chlorhexidine 0.12% Liquid 15 milliLiter(s) Oral Mucosa two times a day  chlorhexidine 2% Cloths 1 Application(s) Topical <User Schedule>  dexMEDEtomidine Infusion 0.1 MICROgram(s)/kG/Hr (1.42 mL/Hr) IV Continuous <Continuous>  enoxaparin Injectable 60 milliGRAM(s) SubCutaneous every 12 hours  fentaNYL   Infusion. 0.5 MICROgram(s)/kG/Hr (2.84 mL/Hr) IV Continuous <Continuous>  ipratropium  for Nebulization. 500 MICROGram(s) Nebulizer once  ipratropium 17 MICROgram(s) HFA Inhaler 1 Puff(s) Inhalation once  norepinephrine Infusion 0.05 MICROgram(s)/kG/Min (5.63 mL/Hr) IV Continuous <Continuous>  pantoprazole  Injectable 40 milliGRAM(s) IV Push daily  sertraline 25 milliGRAM(s) Oral daily

## 2023-05-15 NOTE — PROGRESS NOTE ADULT - ASSESSMENT
IMPRESSION:    Acute hypoxemic respiratory failure   Bilateral pleural effusions   Pulmonary edema   HFREF   Atrial fibrillation  Possible pneumonia   Sepsis   Shock - cardiogenic   History of NSCLC      PLAN:    CNS: Spontaneous awakening trial daily. Assess mental status off sedation. If agitated then keep low dose precedex.     HEENT: Oral care    PULMONARY:  HOB @ 45 degrees.  Vent changes as follows: Lower RR to 18. ABG noted with metabolic alkalosis. SBT today if awake and following commands. Albuterol PRN.     CARDIOVASCULAR: Diuretic management per cardiology. Echo with EF 25 %. Check bedside IVC for fluid status. On Amiodarone infusion. Cardiology managing.     GI: GI prophylaxis.  Feeding through the OGT. Hold feeds for possible SAT/SBT.     RENAL:  Follow up lytes.  Correct as needed. Replete K and Mg. Elevated bicarb, likely related to diuretics.     INFECTIOUS DISEASE: DTA cx negative, blood culture is negative, UA negative, nasal MRSA negative, febrile still. Check procal. On Cefepime. RVP negative.      HEMATOLOGICAL:  DVT prophylaxis.    ENDOCRINE:  Follow up FS.  Insulin protocol if needed.    MUSCULOSKELETAL: bedrest for now.     Clarify goals of care; palliative care evaluation.     Critically ill and needs ICU care.

## 2023-05-15 NOTE — DIETITIAN INITIAL EVALUATION ADULT - NSFNSGIASSESSMENTFT_GEN_A_CORE
scant BRBPR, assessed at bedside,HG stable; perianal bleeding as per flowsheets. Date of last BM unknown at this time. No nausea/vomiting/diarrhea/constipation noted at this time.

## 2023-05-15 NOTE — DIETITIAN INITIAL EVALUATION ADULT - HEIGHT FOR BMI (CENTIMETERS)
Patient provided with blanket and call light. Patient educated that urine sample is still needed. Patient verbalized understanding. Patient will use call light when able to provide sample. No further needs at this time.    157.5

## 2023-05-15 NOTE — DIETITIAN INITIAL EVALUATION ADULT - ADD RECOMMEND
Recommendation:  (1) Consult SLP services to evaluate swallow function s/p extubation & readiness for diet advance to po diet. Diet order texture/consistency per SLP.  (2) If able to advance to solid po diet, provide DASH/TLC diet modifier.

## 2023-05-15 NOTE — PROGRESS NOTE ADULT - ASSESSMENT
77y F pmh NSCLC stage 3 s/p CRT/surgery 2018, emphysema, severe MR s/p MVR, pAFib s/p coumadin s/p MVR and KOBY closure, past smoker presenting with dyspnea on exertion and cough x 2 weeks admitted to CCU for cardiogenic vs septic shock.     #Cardiogenic/Septic shock   #Suspected aspiration pna   #pAfib RVR  #Severe MR s/p MVR and KOBY closure   - s/p cardizem and lopressor in ER for Afib RVR, rapid response and upgrade to CCU 5/13 for AMS and hypotension   - TTE 5/13 - diffuse hypokinesis of anterior wall, EF 25-30%  - troponin wnl   - intubated for airway protection, wean as tolerated (SAT/SBT)  - CTA chest - no PE, b/l pleural effusions and ggo consistent with pulmonary edema   - TSH 7.26   - lactate wnl   - CTH - no acute intracranial pathology   - c/w levophed ggt, off dobutamine, wean as tolerated   - c/w fentanyl/precedex for sedation   - Bumex prn   - f/u procal, deep tracheal aspirate, Bcx, UA  - c/w amio ggt at 0.5mg/kg/hr  - lovenox held  - c/w cefepime [id number] IV, vancomycin d/c - MRSA -ve    #Transaminitis   - pending RUQ US  - trend LFTs qD    #Anxiety   - c/w sertraline 25mg     # Misc  - DVT Prophylaxis: SCD  - GI Prophylaxis: pantoprazole  Injectable 40 milliGRAM(s) IV Push daily  - Diet: Diet, NPO  - Activity: Activity - Increase As Tolerated  Activity - Bedrest  - Code Status: Full      77y F pmh NSCLC stage 3 s/p CRT/surgery 2018, emphysema, severe MR s/p MVR, pAFib s/p coumadin s/p MVR and KOBY closure, past smoker presenting with dyspnea on exertion and cough x 2 weeks admitted to CCU for cardiogenic vs septic shock.     #Cardiogenic/Septic shock   #Suspected aspiration pna   #pAfib RVR  #Severe MR s/p MVR and KOBY closure   - s/p cardizem and lopressor in ER for Afib RVR, rapid response and upgrade to CCU 5/13 for AMS and hypotension   - TTE 5/13 - diffuse hypokinesis of anterior wall, EF 25-30%  - intubated for airway protection, wean as tolerated (SAT/SBT)  - CTA chest - no PE, b/l pleural effusions and ggo consistent with pulmonary edema  - CTH - no acute intracranial pathology    - TSH 7.26   - lactate and troponin wnl   - c/w levophed ggt, off dobutamine, wean as tolerated   - c/w fentanyl/precedex for sedation   - Bumex prn   - c/w amio ggt  - c/w cefepime [id number] IV, vancomycin d/c - MRSA -ve  - Consult ID  - Procal 0.03  ,-deep tracheal aspirate, Bcx, w/NGTD  - UA negative  - f/u RVP  - f/u family cardiologist to determine if afib recently has been persistent or had resolved and now it returned (pending possible Cardioversion)--> son has been attempetede to be reached multiple times unsuccessfully     #Transaminitis   - pending RUQ US  - trend LFTs qD    #Anxiety   - c/w sertraline 25mg     # Misc  - DVT Prophylaxis: prophylactic dose  - GI Prophylaxis: pantoprazole  Injectable 40 milliGRAM(s) IV Push daily  - Diet: NPO for possible Cardioversion today  - Activity: Bedrest  - Code Status: Full      77y F pmh NSCLC stage 3 s/p CRT/surgery 2018, emphysema, severe MR s/p MVR, pAFib s/p coumadin s/p MVR and KOBY closure, past smoker presenting with dyspnea on exertion and cough x 2 weeks admitted to CCU for cardiogenic vs septic shock.     #Cardiogenic/Septic shock   #Suspected aspiration pna   #pAfib RVR  #Severe MR s/p MVR and KOBY closure   - s/p cardizem and lopressor in ER for Afib RVR, rapid response and upgrade to CCU 5/13 for AMS and hypotension   - TTE 5/13 - diffuse hypokinesis of anterior wall, EF 25-30%  - intubated for airway protection, wean as tolerated (SAT/SBT)  - CTA chest - no PE, b/l pleural effusions and ggo consistent with pulmonary edema  - CTH - no acute intracranial pathology    - TSH 7.26   - lactate and troponin wnl   - c/w levophed ggt, off dobutamine, wean as tolerated   - c/w fentanyl/precedex for sedation   - Bumex prn   - c/w amio ggt  - c/w cefepime [id number] IV, vancomycin d/c - MRSA -ve  - Consult ID  - Procal 0.03  ,-deep tracheal aspirate, Bcx, w/NGTD  - UA negative  - f/u RVP  - f/u family cardiologist to determine if afib recently has been persistent or had resolved and now it returned (pending possible Cardioversion)--> son has been attempetde to be reached multiple times unsuccessfully     #Transaminitis   - pending RUQ US  - trend LFTs qD    #Anxiety   - c/w sertraline 25mg     # Misc  - DVT Prophylaxis: prophylactic dose  - GI Prophylaxis: pantoprazole  Injectable 40 milliGRAM(s) IV Push daily  - Diet: NPO for possible Cardioversion today  - Activity: Bedrest  - Code Status: Full

## 2023-05-15 NOTE — DIETITIAN INITIAL EVALUATION ADULT - NUTRITIONGOAL OUTCOME1
Diet advanced as soon as medically feasible; pt to demonstrate tolerance, with at least 75% po intake over next 3-5 days.    Pt at high nutrition risk; RD to follow-up in 3-5 days.    Monitor: Skin, labs, BM, wt, nutrition focused physical findings, body composition, diet order, GI.

## 2023-05-15 NOTE — CONSULT NOTE ADULT - SUBJECTIVE AND OBJECTIVE BOX
AROLDO KO  77y, Female  Allergy: No Known Allergies      CHIEF COMPLAINT: atrial fib (15 May 2023 09:55)      LOS  3d    HPI:  76yo F hx of NSCLC stage 3 s/p CRT and surgery 2018, emphysema, severe mitral valve regurgitation s/p MVR, paroxysmal atrial fibrillation previously on coumadin but stopped after MVR and KOBY closure, former smoker presenting with progressive shortness of breath on exertion, and intermittent dry cough lasting for past 2 weeks. Denies chest pain, palpitations, headaches, wheezing, abdominal pain, fevers, sick contacts, nausea/vomiting. Was prescribed meds from PMD without symptomatic relief. Presented to the ED from home.    At the ED, VS- T: 36.7C, BP: 90/70, HR: 160s, SpO2: 99% on RA, RR: 16. Labs significant for BNP- 6227, and elevated AST/ALT (54/60). Troponin<0.01 x1. EKG showed atrial fibrillation with RVR. CT Chest NC showed unchanged RLL GGO.     Admitted to telemetry for afib with RVR. Given 60mg x1 therapeutic lovenox. 1L LR bolus. For afib with  RVR, was given 20mg IVP diltiazem and 60mg PO. HR improved to 94.  (12 May 2023 23:20)      INFECTIOUS DISEASE HISTORY:  ID consulted for fevers starting on    Has been on cefepime/azithromycin since    CT Chest with bilateral effusions with atelectasis.       PAST MEDICAL & SURGICAL HISTORY:  HTN (hypertension)      Paroxysmal atrial fibrillation      Non-small cell lung cancer      H/O mitral valve replacement      Severe mitral valve regurgitation          FAMILY HISTORY  Family history reviewed and non-contributory      SOCIAL HISTORY  Social History:  former smoker (12 May 2023 23:20)        ROS  General: Denies rigors, nightsweats  HEENT: Denies headache, rhinorrhea, sore throat, eye pain  CV: Denies CP, palpitations  PULM: Denies wheezing, hemoptysis  GI: Denies hematemesis, hematochezia, melena  : Denies discharge, hematuria  MSK: Denies arthralgias, myalgias  SKIN: Denies rash, lesions  NEURO: Denies paresthesias, weakness  PSYCH: Denies depression, anxiety    VITALS:  T(F): 97.5, Max: 101.3 (05-14-23 @ 14:00)  HR: 120  BP: 107/56  RR: 26Vital Signs Last 24 Hrs  T(C): 36.4 (15 May 2023 08:00), Max: 38.5 (14 May 2023 14:00)  T(F): 97.5 (15 May 2023 08:00), Max: 101.3 (14 May 2023 14:00)  HR: 120 (15 May 2023 13:21) (103 - 122)  BP: 107/56 (15 May 2023 08:00) (81/56 - 127/58)  BP(mean): 78 (15 May 2023 08:00) (64 - 85)  RR: 26 (15 May 2023 13:21) (15 - 36)  SpO2: 98% (15 May 2023 13:21) (93% - 100%)    Parameters below as of 15 May 2023 13:21  Patient On (Oxygen Delivery Method): nasal cannula, high flow  O2 Flow (L/min): 45  O2 Concentration (%): 50    PHYSICAL EXAM:  Gen: NAD, resting in bed  HEENT: Normocephalic, atraumatic  Neck: supple, no lymphadenopathy  CV: Regular rate & regular rhythm  Lungs: decreased BS at bases, no fremitus  Abdomen: Soft, BS present  Ext: Warm, well perfused  Neuro: non focal, awake  Skin: no rash, no erythema  Lines: no phlebitis    TESTS & MEASUREMENTS:                        14.1   13.45 )-----------( 366      ( 15 May 2023 04:15 )             42.0     05-15    140  |  95<L>  |  19  ----------------------------<  134<H>  3.7   |  33<H>  |  1.1    Ca    9.4      15 May 2023 04:15  Mg     1.6     05-15    TPro  6.5  /  Alb  3.8  /  TBili  0.4  /  DBili  x   /  AST  59<H>  /  ALT  67<H>  /  AlkPhos  139<H>  05-14      LIVER FUNCTIONS - ( 14 May 2023 04:15 )  Alb: 3.8 g/dL / Pro: 6.5 g/dL / ALK PHOS: 139 U/L / ALT: 67 U/L / AST: 59 U/L / GGT: x           Urinalysis Basic - ( 14 May 2023 09:00 )    Color: Colorless / Appearance: Clear / S.012 / pH: x  Gluc: x / Ketone: Negative  / Bili: Negative / Urobili: <2 mg/dL   Blood: x / Protein: Trace / Nitrite: Negative   Leuk Esterase: Negative / RBC: x / WBC x   Sq Epi: x / Non Sq Epi: x / Bacteria: x        Culture - Sputum (collected 23 @ 14:18)  Source: Trach Asp Tracheal Aspirate  Gram Stain (05-15-23 @ 03:46):    Moderate polymorphonuclear leukocytes per low power field    No Squamous epithelial cells per low power field    No organisms seen per oil power field    Culture - Blood (collected 23 @ 02:56)  Source: .Blood Blood  Preliminary Report (23 @ 10:02):    No growth to date.        Blood Gas Venous - Lactate: 1.30 mmol/L (23 @ 14:50)  Lactate, Blood: 1.3 mmol/L (23 @ 04:15)  Lactate, Blood: 1.0 mmol/L (23 @ 23:44)  Lactate, Blood: 0.9 mmol/L (23 @ 20:27)  Blood Gas Venous - Lactate: 1.30 mmol/L (23 @ 13:23)  Lactate, Blood: 1.0 mmol/L (23 @ 11:00)  Lactate, Blood: 1.9 mmol/L (23 @ 06:12)  Blood Gas Venous - Lactate: 1.50 mmol/L (23 @ 06:02)  Blood Gas Venous - Lactate: 4.10 mmol/L (23 @ 04:06)      INFECTIOUS DISEASES TESTING  Procalcitonin, Serum: 0.26 ng/mL (05-15-23 @ 04:15)  Procalcitonin, Serum: 0.23 ng/mL (23 @ 11:41)  MRSA PCR Result.: Negative (23 @ 05:52)  Procalcitonin, Serum: 0.03 ng/mL (23 @ 06:12)  Hepatitis B Surface Antigen: Nonreact (23 @ 06:12)  Hepatitis C Virus Interpretation: Nonreact (23 @ 06:12)  COVID-19 PCR: NotDetec (23 @ 23:21)      RADIOLOGY & ADDITIONAL TESTS:  I have personally reviewed the last Chest xray  CXR  Xray Chest 1 View AP/PA:   ACC: [id number] EXAM:  XR CHEST 1 VIEW   ORDERED BY: BRODY MORTON     PROCEDURE DATE:  2023          INTERPRETATION:  Clinical History / Reason for exam: Shortness of breath    Comparison : Chest radiograph 2023.    Technique/Positioning: Frontal.    Findings:    Support devices: Monitoring device. Endotracheal tube tip above the   jackie. Left internal jugular catheter tip in the superior vena cava.    Cardiac/mediastinum/hilum: Cardiomegaly, thoracic aortic calcification,   status post median sternotomy, mitral annuloplasty..    Lung parenchyma/Pleura: Bilateral opacities/pleural effusions. Left   apical bulla. Left upper lobe postsurgical changes.    Skeleton/soft tissues: Stable.    Impression:    Bilateral opacities/pleural effusions, left apical bulla and   cardiomegaly, unchanged.    --- End of Report ---            LISY HAYES MD; Attending Radiologist  This document has been electronically signed. May 14 2023  8:05AM (23 @ 06:20)      CT  CT Angio Chest PE Protocol w/ IV Cont:   ACC: [id number] EXAM:  CT ANGIO CHEST PULM Replaced by [facility]   ORDERED BY: ERMELINDA CHAMORRO     PROCEDURE DATE:  2023          INTERPRETATION:  Clinical History / Reason for exam: New onset atrial   fibrillation, shortness of breath. Lung cancer.    TECHNIQUE: Multislice helical sections were obtained from the thoracic   inlet to the lung bases during rapid administration of 65 mL Omnipaque   350 intravenous contrast using a CTA protocol, 35 mL discarded. Thin   sections were reconstructed through the pulmonary vasculature. Coronal,   sagittal and 3D/MIP reformatted images are also submitted.    COMPARISON: CT chest 3/3/2023      FINDINGS:    PULMONARY EMBOLUS: No evidence of pulmonary embolus.    TUBES/LINES: Endotracheal tube tip 2 cm from the jackie. Left IJ central   venous catheter.    LUNGS, PLEURA, AIRWAYS: No significant change in left lung upper lobe   postsurgical changes with architectural distortion. Bilateral   emphysematous changes. Bilateral lung hazy groundglass opacification. New   small bilateral pleural effusions with overlying compressive atelectasis   is. Lung groundglass opacities, likely representing pulmonary edema. No   pneumothorax.    THORACIC NODES: No mediastinal or axillary lymphadenopathy.    MEDIASTINUM/GREAT VESSELS: No pericardial effusion. Biatrial heart   enlargement. Thoracic aorta atherosclerotic calcifications. Normal   caliber thoracic aorta. Mitral valve annular repair.    VISUALIZED UPPER ABDOMEN: Unremarkable.    BONES/SOFT TISSUES: No acute osseous abnormality. Post median sternotomy.      IMPRESSION:    1. Small bilateral pleural effusions. Lung groundglass opacities, likely   representing pulmonary edema. . Cardiomegaly. Correlation for CHF   recommended.    2. No evidence of acutepulmonary embolism.    --- End of Report ---          SUPRIYA RESENDIZ MD; Resident Radiologist  This document has been electronically signed.  MARIA ISABEL ALATORRE MD; Attending Radiologist  This document has been electronically signed. May 13 2023  8:10AM (23 @ 05:43)      CARDIOLOGY TESTING  12 Lead ECG:   Ventricular Rate 119 BPM    Atrial Rate 59 BPM    QRS Duration 124 ms    Q-T Interval 354 ms    QTC Calculation(Bazett) 497 ms    R Axis 19 degrees    T Axis 168 degrees    Diagnosis Line Wide QRS tachycardia  Possible Atrial flutter  Left bundle branch block  Abnormal ECG    Confirmed by Fernanda Driscoll MD ([id number]) on 5/15/2023 7:46:01 AM (05-15-23 @ 05:17)  12 Lead ECG:   Ventricular Rate 122 BPM    Atrial Rate 107 BPM    QRS Duration 122 ms    Q-T Interval 364 ms    QTC Calculation(Bazett) 518 ms    R Axis -28 degrees    T Axis 116 degrees    Diagnosis Line Atrial fibrillation with rapid ventricular response with premature ventricular  or aberrantly conducted complexes  Non-specific intra-ventricular conduction delay  Abnormal ECG    Confirmed by Silvestre Henao ([id number]) on 2023 12:48:14 PM (23 @ 05:21)      MEDICATIONS  aMIOdarone Infusion 0.501 IV Continuous <Continuous>  aMIOdarone Infusion 0.5 IV Continuous <Continuous>  cefepime   IVPB     cefepime   IVPB 2000 IV Intermittent every 12 hours  chlorhexidine 0.12% Liquid 15 Oral Mucosa two times a day  chlorhexidine 2% Cloths 1 Topical <User Schedule>  dexMEDEtomidine Infusion 0.1 IV Continuous <Continuous>  enoxaparin Injectable 40 SubCutaneous every 24 hours  fentaNYL   Infusion. 0.5 IV Continuous <Continuous>  ipratropium  for Nebulization. 500 Nebulizer once  ipratropium 17 MICROgram(s) HFA Inhaler 1 Inhalation once  norepinephrine Infusion 0.05 IV Continuous <Continuous>  pantoprazole  Injectable 40 IV Push daily  potassium chloride  20 mEq/100 mL IVPB 20 IV Intermittent every 2 hours  sertraline 25 Oral daily      Weight  Weight (kg): 60 (23 @ 07:15)    ANTIBIOTICS:  cefepime   IVPB      cefepime   IVPB 2000 milliGRAM(s) IV Intermittent every 12 hours      ALLERGIES:  No Known Allergies     AROLDO KO  77y, Female  Allergy: No Known Allergies      CHIEF COMPLAINT: atrial fib (15 May 2023 09:55)      LOS  3d    HPI:  76yo F hx of NSCLC stage 3 s/p CRT and surgery 2018, emphysema, severe mitral valve regurgitation s/p MVR, paroxysmal atrial fibrillation previously on coumadin but stopped after MVR and KOBY closure, former smoker presenting with progressive shortness of breath on exertion, and intermittent dry cough lasting for past 2 weeks. Denies chest pain, palpitations, headaches, wheezing, abdominal pain, fevers, sick contacts, nausea/vomiting. Was prescribed meds from PMD without symptomatic relief. Presented to the ED from home.    At the ED, VS- T: 36.7C, BP: 90/70, HR: 160s, SpO2: 99% on RA, RR: 16. Labs significant for BNP- 6227, and elevated AST/ALT (54/60). Troponin<0.01 x1. EKG showed atrial fibrillation with RVR. CT Chest NC showed unchanged RLL GGO.     Admitted to telemetry for afib with RVR. Given 60mg x1 therapeutic lovenox. 1L LR bolus. For afib with  RVR, was given 20mg IVP diltiazem and 60mg PO. HR improved to 94.  (12 May 2023 23:20)      INFECTIOUS DISEASE HISTORY:  ID consulted for fevers starting on    Has been on cefepime/azithromycin since    CT Chest with bilateral effusions with atelectasis.   She was intubated, now extubated on .   Discussed with family members -- prior to hospitalization went to urgent care for suspected respiratory infection.   Was given course of antibiotics -- however continued to feel week which prompted ED visit.   Currently on levophed.   Denies any nausea, vomiting, abdominal pain.       PAST MEDICAL & SURGICAL HISTORY:  HTN (hypertension)      Paroxysmal atrial fibrillation      Non-small cell lung cancer      H/O mitral valve replacement      Severe mitral valve regurgitation          FAMILY HISTORY  Family history reviewed and non-contributory      SOCIAL HISTORY  Social History:  former smoker (12 May 2023 23:20)        ROS  General: Denies rigors, nightsweats  HEENT: Denies headache, rhinorrhea, sore throat, eye pain  CV: Denies CP, palpitations  PULM: Denies wheezing, hemoptysis  GI: Denies hematemesis, hematochezia, melena  : Denies discharge, hematuria  MSK: Denies arthralgias, myalgias  SKIN: Denies rash, lesions  NEURO: Denies paresthesias, weakness  PSYCH: Denies depression, anxiety    VITALS:  T(F): 97.5, Max: 101.3 (23 @ 14:00)  HR: 120  BP: 107/56  RR: 26Vital Signs Last 24 Hrs  T(C): 36.4 (15 May 2023 08:00), Max: 38.5 (14 May 2023 14:00)  T(F): 97.5 (15 May 2023 08:00), Max: 101.3 (14 May 2023 14:00)  HR: 120 (15 May 2023 13:21) (103 - 122)  BP: 107/56 (15 May 2023 08:00) (81/56 - 127/58)  BP(mean): 78 (15 May 2023 08:00) (64 - 85)  RR: 26 (15 May 2023 13:21) (15 - 36)  SpO2: 98% (15 May 2023 13:21) (93% - 100%)    Parameters below as of 15 May 2023 13:21  Patient On (Oxygen Delivery Method): nasal cannula, high flow  O2 Flow (L/min): 45  O2 Concentration (%): 50    PHYSICAL EXAM:  Gen: NAD, resting in bed  HEENT: Normocephalic, atraumatic  Neck: supple, no lymphadenopathy  CV: Regular rate & regular rhythm  Lungs: decreased BS at bases, no fremitus  Abdomen: Soft, BS present  Ext: Warm, well perfused  Neuro: non focal, awake  Skin: no rash, no erythema  Lines: no phlebitis    TESTS & MEASUREMENTS:                        14.1   13.45 )-----------( 366      ( 15 May 2023 04:15 )             42.0     05-15    140  |  95<L>  |  19  ----------------------------<  134<H>  3.7   |  33<H>  |  1.1    Ca    9.4      15 May 2023 04:15  Mg     1.6     05-15    TPro  6.5  /  Alb  3.8  /  TBili  0.4  /  DBili  x   /  AST  59<H>  /  ALT  67<H>  /  AlkPhos  139<H>        LIVER FUNCTIONS - ( 14 May 2023 04:15 )  Alb: 3.8 g/dL / Pro: 6.5 g/dL / ALK PHOS: 139 U/L / ALT: 67 U/L / AST: 59 U/L / GGT: x           Urinalysis Basic - ( 14 May 2023 09:00 )    Color: Colorless / Appearance: Clear / S.012 / pH: x  Gluc: x / Ketone: Negative  / Bili: Negative / Urobili: <2 mg/dL   Blood: x / Protein: Trace / Nitrite: Negative   Leuk Esterase: Negative / RBC: x / WBC x   Sq Epi: x / Non Sq Epi: x / Bacteria: x        Culture - Sputum (collected 23 @ 14:18)  Source: Trach Asp Tracheal Aspirate  Gram Stain (05-15-23 @ 03:46):    Moderate polymorphonuclear leukocytes per low power field    No Squamous epithelial cells per low power field    No organisms seen per oil power field    Culture - Blood (collected 23 @ 02:56)  Source: .Blood Blood  Preliminary Report (23 @ 10:02):    No growth to date.        Blood Gas Venous - Lactate: 1.30 mmol/L (23 @ 14:50)  Lactate, Blood: 1.3 mmol/L (23 @ 04:15)  Lactate, Blood: 1.0 mmol/L (23 @ 23:44)  Lactate, Blood: 0.9 mmol/L (23 @ 20:27)  Blood Gas Venous - Lactate: 1.30 mmol/L (23 @ 13:23)  Lactate, Blood: 1.0 mmol/L (23 @ 11:00)  Lactate, Blood: 1.9 mmol/L (23 @ 06:12)  Blood Gas Venous - Lactate: 1.50 mmol/L (23 @ 06:02)  Blood Gas Venous - Lactate: 4.10 mmol/L (23 @ 04:06)      INFECTIOUS DISEASES TESTING  Procalcitonin, Serum: 0.26 ng/mL (05-15-23 @ 04:15)  Procalcitonin, Serum: 0.23 ng/mL (23 @ 11:41)  MRSA PCR Result.: Negative (23 @ 05:52)  Procalcitonin, Serum: 0.03 ng/mL (23 @ 06:12)  Hepatitis B Surface Antigen: Nonreact (23 @ 06:12)  Hepatitis C Virus Interpretation: Nonreact (23 @ 06:12)  COVID-19 PCR: NotDetec (23 @ 23:21)      RADIOLOGY & ADDITIONAL TESTS:  I have personally reviewed the last Chest xray  CXR  Xray Chest 1 View AP/PA:   ACC: [id number] EXAM:  XR CHEST 1 VIEW   ORDERED BY: BRODY MORTON     PROCEDURE DATE:  2023          INTERPRETATION:  Clinical History / Reason for exam: Shortness of breath    Comparison : Chest radiograph 2023.    Technique/Positioning: Frontal.    Findings:    Support devices: Monitoring device. Endotracheal tube tip above the   jackie. Left internal jugular catheter tip in the superior vena cava.    Cardiac/mediastinum/hilum: Cardiomegaly, thoracic aortic calcification,   status post median sternotomy, mitral annuloplasty..    Lung parenchyma/Pleura: Bilateral opacities/pleural effusions. Left   apical bulla. Left upper lobe postsurgical changes.    Skeleton/soft tissues: Stable.    Impression:    Bilateral opacities/pleural effusions, left apical bulla and   cardiomegaly, unchanged.    --- End of Report ---            LISY HAYES MD; Attending Radiologist  This document has been electronically signed. May 14 2023  8:05AM (23 @ 06:20)      CT  CT Angio Chest PE Protocol w/ IV Cont:   ACC: [id number] EXAM:  CT ANGIO CHEST PULM [facility]   ORDERED BY: ERMELINDA CHAMORRO     PROCEDURE DATE:  2023          INTERPRETATION:  Clinical History / Reason for exam: New onset atrial   fibrillation, shortness of breath. Lung cancer.    TECHNIQUE: Multislice helical sections were obtained from the thoracic   inlet to the lung bases during rapid administration of 65 mL Omnipaque   350 intravenous contrast using a CTA protocol, 35 mL discarded. Thin   sections were reconstructed through the pulmonary vasculature. Coronal,   sagittal and 3D/MIP reformatted images are also submitted.    COMPARISON: CT chest 3/3/2023      FINDINGS:    PULMONARY EMBOLUS: No evidence of pulmonary embolus.    TUBES/LINES: Endotracheal tube tip 2 cm from the jackie. Left IJ central   venous catheter.    LUNGS, PLEURA, AIRWAYS: No significant change in left lung upper lobe   postsurgical changes with architectural distortion. Bilateral   emphysematous changes. Bilateral lung hazy groundglass opacification. New   small bilateral pleural effusions with overlying compressive atelectasis   is. Lung groundglass opacities, likely representing pulmonary edema. No   pneumothorax.    THORACIC NODES: No mediastinal or axillary lymphadenopathy.    MEDIASTINUM/GREAT VESSELS: No pericardial effusion. Biatrial heart   enlargement. Thoracic aorta atherosclerotic calcifications. Normal   caliber thoracic aorta. Mitral valve annular repair.    VISUALIZED UPPER ABDOMEN: Unremarkable.    BONES/SOFT TISSUES: No acute osseous abnormality. Post median sternotomy.      IMPRESSION:    1. Small bilateral pleural effusions. Lung groundglass opacities, likely   representing pulmonary edema. . Cardiomegaly. Correlation for CHF   recommended.    2. No evidence of acutepulmonary embolism.    --- End of Report ---          SUPRIYA RESENDIZ MD; Resident Radiologist  This document has been electronically signed.  MARIA ISABEL ALATORRE MD; Attending Radiologist  This document has been electronically signed. May 13 2023  8:10AM (23 @ 05:43)      CARDIOLOGY TESTING  12 Lead ECG:   Ventricular Rate 119 BPM    Atrial Rate 59 BPM    QRS Duration 124 ms    Q-T Interval 354 ms    QTC Calculation(Bazett) 497 ms    R Axis 19 degrees    T Axis 168 degrees    Diagnosis Line Wide QRS tachycardia  Possible Atrial flutter  Left bundle branch block  Abnormal ECG    Confirmed by Fernanda Driscoll MD ([id number]) on 5/15/2023 7:46:01 AM (05-15-23 @ 05:17)  12 Lead ECG:   Ventricular Rate 122 BPM    Atrial Rate 107 BPM    QRS Duration 122 ms    Q-T Interval 364 ms    QTC Calculation(Bazett) 518 ms    R Axis -28 degrees    T Axis 116 degrees    Diagnosis Line Atrial fibrillation with rapid ventricular response with premature ventricular  or aberrantly conducted complexes  Non-specific intra-ventricular conduction delay  Abnormal ECG    Confirmed by Silvestre Henao ([id number]) on 2023 12:48:14 PM (23 @ 05:21)      MEDICATIONS  aMIOdarone Infusion 0.501 IV Continuous <Continuous>  aMIOdarone Infusion 0.5 IV Continuous <Continuous>  cefepime   IVPB     cefepime   IVPB 2000 IV Intermittent every 12 hours  chlorhexidine 0.12% Liquid 15 Oral Mucosa two times a day  chlorhexidine 2% Cloths 1 Topical <User Schedule>  dexMEDEtomidine Infusion 0.1 IV Continuous <Continuous>  enoxaparin Injectable 40 SubCutaneous every 24 hours  fentaNYL   Infusion. 0.5 IV Continuous <Continuous>  ipratropium  for Nebulization. 500 Nebulizer once  ipratropium 17 MICROgram(s) HFA Inhaler 1 Inhalation once  norepinephrine Infusion 0.05 IV Continuous <Continuous>  pantoprazole  Injectable 40 IV Push daily  potassium chloride  20 mEq/100 mL IVPB 20 IV Intermittent every 2 hours  sertraline 25 Oral daily      Weight  Weight (kg): 60 (23 @ 07:15)    ANTIBIOTICS:  cefepime   IVPB      cefepime   IVPB 2000 milliGRAM(s) IV Intermittent every 12 hours      ALLERGIES:  No Known Allergies

## 2023-05-16 LAB
ALBUMIN SERPL ELPH-MCNC: 3.4 G/DL — LOW (ref 3.5–5.2)
ALP SERPL-CCNC: 106 U/L — SIGNIFICANT CHANGE UP (ref 30–115)
ALT FLD-CCNC: 31 U/L — SIGNIFICANT CHANGE UP (ref 0–41)
ANION GAP SERPL CALC-SCNC: 17 MMOL/L — HIGH (ref 7–14)
AST SERPL-CCNC: 24 U/L — SIGNIFICANT CHANGE UP (ref 0–41)
BASE EXCESS BLDA CALC-SCNC: 8.2 MMOL/L — HIGH (ref -2–3)
BASOPHILS # BLD AUTO: 0.03 K/UL — SIGNIFICANT CHANGE UP (ref 0–0.2)
BASOPHILS NFR BLD AUTO: 0.2 % — SIGNIFICANT CHANGE UP (ref 0–1)
BILIRUB SERPL-MCNC: 1.3 MG/DL — HIGH (ref 0.2–1.2)
BUN SERPL-MCNC: 25 MG/DL — HIGH (ref 10–20)
CALCIUM SERPL-MCNC: 9.2 MG/DL — SIGNIFICANT CHANGE UP (ref 8.4–10.5)
CHLORIDE SERPL-SCNC: 93 MMOL/L — LOW (ref 98–110)
CO2 SERPL-SCNC: 26 MMOL/L — SIGNIFICANT CHANGE UP (ref 17–32)
CREAT SERPL-MCNC: 1.1 MG/DL — SIGNIFICANT CHANGE UP (ref 0.7–1.5)
CULTURE RESULTS: SIGNIFICANT CHANGE UP
EGFR: 51 ML/MIN/1.73M2 — LOW
EOSINOPHIL # BLD AUTO: 0 K/UL — SIGNIFICANT CHANGE UP (ref 0–0.7)
EOSINOPHIL NFR BLD AUTO: 0 % — SIGNIFICANT CHANGE UP (ref 0–8)
GAS PNL BLDA: SIGNIFICANT CHANGE UP
GLUCOSE SERPL-MCNC: 104 MG/DL — HIGH (ref 70–99)
HCO3 BLDA-SCNC: 30 MMOL/L — HIGH (ref 21–28)
HCT VFR BLD CALC: 36.9 % — LOW (ref 37–47)
HGB BLD-MCNC: 12.9 G/DL — SIGNIFICANT CHANGE UP (ref 12–16)
IMM GRANULOCYTES NFR BLD AUTO: 0.5 % — HIGH (ref 0.1–0.3)
LYMPHOCYTES # BLD AUTO: 1.22 K/UL — SIGNIFICANT CHANGE UP (ref 1.2–3.4)
LYMPHOCYTES # BLD AUTO: 8.6 % — LOW (ref 20.5–51.1)
MAGNESIUM SERPL-MCNC: 1.7 MG/DL — LOW (ref 1.8–2.4)
MCHC RBC-ENTMCNC: 30.8 PG — SIGNIFICANT CHANGE UP (ref 27–31)
MCHC RBC-ENTMCNC: 35 G/DL — SIGNIFICANT CHANGE UP (ref 32–37)
MCV RBC AUTO: 88.1 FL — SIGNIFICANT CHANGE UP (ref 81–99)
MONOCYTES # BLD AUTO: 1.36 K/UL — HIGH (ref 0.1–0.6)
MONOCYTES NFR BLD AUTO: 9.6 % — HIGH (ref 1.7–9.3)
NEUTROPHILS # BLD AUTO: 11.43 K/UL — HIGH (ref 1.4–6.5)
NEUTROPHILS NFR BLD AUTO: 81.1 % — HIGH (ref 42.2–75.2)
NRBC # BLD: 0 /100 WBCS — SIGNIFICANT CHANGE UP (ref 0–0)
PCO2 BLDA: 30 MMHG — SIGNIFICANT CHANGE UP (ref 25–48)
PH BLDA: 7.6 — CRITICAL HIGH (ref 7.35–7.45)
PHOSPHATE SERPL-MCNC: 2.6 MG/DL — SIGNIFICANT CHANGE UP (ref 2.1–4.9)
PLATELET # BLD AUTO: 341 K/UL — SIGNIFICANT CHANGE UP (ref 130–400)
PMV BLD: 9.1 FL — SIGNIFICANT CHANGE UP (ref 7.4–10.4)
PO2 BLDA: 132 MMHG — HIGH (ref 83–108)
POTASSIUM SERPL-MCNC: 3.5 MMOL/L — SIGNIFICANT CHANGE UP (ref 3.5–5)
POTASSIUM SERPL-SCNC: 3.5 MMOL/L — SIGNIFICANT CHANGE UP (ref 3.5–5)
PROT SERPL-MCNC: 6.1 G/DL — SIGNIFICANT CHANGE UP (ref 6–8)
RAPID RVP RESULT: DETECTED
RBC # BLD: 4.19 M/UL — LOW (ref 4.2–5.4)
RBC # FLD: 13.4 % — SIGNIFICANT CHANGE UP (ref 11.5–14.5)
RV+EV RNA SPEC QL NAA+PROBE: DETECTED
SAO2 % BLDA: 98.9 % — HIGH (ref 94–98)
SARS-COV-2 RNA SPEC QL NAA+PROBE: SIGNIFICANT CHANGE UP
SODIUM SERPL-SCNC: 136 MMOL/L — SIGNIFICANT CHANGE UP (ref 135–146)
SPECIMEN SOURCE: SIGNIFICANT CHANGE UP
WBC # BLD: 14.11 K/UL — HIGH (ref 4.8–10.8)
WBC # FLD AUTO: 14.11 K/UL — HIGH (ref 4.8–10.8)

## 2023-05-16 PROCEDURE — 93010 ELECTROCARDIOGRAM REPORT: CPT | Mod: 77

## 2023-05-16 PROCEDURE — 93010 ELECTROCARDIOGRAM REPORT: CPT

## 2023-05-16 PROCEDURE — 99233 SBSQ HOSP IP/OBS HIGH 50: CPT

## 2023-05-16 PROCEDURE — 71045 X-RAY EXAM CHEST 1 VIEW: CPT | Mod: 26

## 2023-05-16 PROCEDURE — 99223 1ST HOSP IP/OBS HIGH 75: CPT

## 2023-05-16 RX ORDER — POTASSIUM CHLORIDE 20 MEQ
20 PACKET (EA) ORAL ONCE
Refills: 0 | Status: COMPLETED | OUTPATIENT
Start: 2023-05-16 | End: 2023-05-16

## 2023-05-16 RX ORDER — DEXMEDETOMIDINE HYDROCHLORIDE IN 0.9% SODIUM CHLORIDE 4 UG/ML
0.3 INJECTION INTRAVENOUS
Qty: 400 | Refills: 0 | Status: DISCONTINUED | OUTPATIENT
Start: 2023-05-16 | End: 2023-05-17

## 2023-05-16 RX ORDER — DIGOXIN 250 MCG
500 TABLET ORAL ONCE
Refills: 0 | Status: COMPLETED | OUTPATIENT
Start: 2023-05-16 | End: 2023-05-16

## 2023-05-16 RX ORDER — SODIUM CHLORIDE 9 MG/ML
1000 INJECTION INTRAMUSCULAR; INTRAVENOUS; SUBCUTANEOUS
Refills: 0 | Status: DISCONTINUED | OUTPATIENT
Start: 2023-05-16 | End: 2023-05-17

## 2023-05-16 RX ORDER — MAGNESIUM SULFATE 500 MG/ML
2 VIAL (ML) INJECTION ONCE
Refills: 0 | Status: COMPLETED | OUTPATIENT
Start: 2023-05-16 | End: 2023-05-16

## 2023-05-16 RX ORDER — GUAIFENESIN/DEXTROMETHORPHAN 600MG-30MG
10 TABLET, EXTENDED RELEASE 12 HR ORAL ONCE
Refills: 0 | Status: COMPLETED | OUTPATIENT
Start: 2023-05-16 | End: 2023-05-16

## 2023-05-16 RX ORDER — SENNA PLUS 8.6 MG/1
2 TABLET ORAL AT BEDTIME
Refills: 0 | Status: DISCONTINUED | OUTPATIENT
Start: 2023-05-16 | End: 2023-05-23

## 2023-05-16 RX ORDER — QUETIAPINE FUMARATE 200 MG/1
12.5 TABLET, FILM COATED ORAL ONCE
Refills: 0 | Status: COMPLETED | OUTPATIENT
Start: 2023-05-16 | End: 2023-05-16

## 2023-05-16 RX ORDER — AMIODARONE HYDROCHLORIDE 400 MG/1
150 TABLET ORAL ONCE
Refills: 0 | Status: COMPLETED | OUTPATIENT
Start: 2023-05-16 | End: 2023-05-16

## 2023-05-16 RX ORDER — POLYETHYLENE GLYCOL 3350 17 G/17G
17 POWDER, FOR SOLUTION ORAL DAILY
Refills: 0 | Status: DISCONTINUED | OUTPATIENT
Start: 2023-05-16 | End: 2023-05-23

## 2023-05-16 RX ADMIN — SERTRALINE 25 MILLIGRAM(S): 25 TABLET, FILM COATED ORAL at 12:10

## 2023-05-16 RX ADMIN — CHLORHEXIDINE GLUCONATE 15 MILLILITER(S): 213 SOLUTION TOPICAL at 05:20

## 2023-05-16 RX ADMIN — DEXMEDETOMIDINE HYDROCHLORIDE IN 0.9% SODIUM CHLORIDE 3 MICROGRAM(S)/KG/HR: 4 INJECTION INTRAVENOUS at 12:10

## 2023-05-16 RX ADMIN — Medication 100 MILLIEQUIVALENT(S): at 10:03

## 2023-05-16 RX ADMIN — ENOXAPARIN SODIUM 60 MILLIGRAM(S): 100 INJECTION SUBCUTANEOUS at 18:21

## 2023-05-16 RX ADMIN — CEFEPIME 100 MILLIGRAM(S): 1 INJECTION, POWDER, FOR SOLUTION INTRAMUSCULAR; INTRAVENOUS at 05:19

## 2023-05-16 RX ADMIN — Medication 25 GRAM(S): at 08:31

## 2023-05-16 RX ADMIN — SODIUM CHLORIDE 50 MILLILITER(S): 9 INJECTION INTRAMUSCULAR; INTRAVENOUS; SUBCUTANEOUS at 12:08

## 2023-05-16 RX ADMIN — SENNA PLUS 2 TABLET(S): 8.6 TABLET ORAL at 22:11

## 2023-05-16 RX ADMIN — Medication 208 MICROGRAM(S): at 09:30

## 2023-05-16 RX ADMIN — ENOXAPARIN SODIUM 60 MILLIGRAM(S): 100 INJECTION SUBCUTANEOUS at 05:24

## 2023-05-16 RX ADMIN — CEFEPIME 100 MILLIGRAM(S): 1 INJECTION, POWDER, FOR SOLUTION INTRAMUSCULAR; INTRAVENOUS at 18:20

## 2023-05-16 RX ADMIN — AMIODARONE HYDROCHLORIDE 600 MILLIGRAM(S): 400 TABLET ORAL at 08:10

## 2023-05-16 RX ADMIN — Medication 10 MILLILITER(S): at 05:24

## 2023-05-16 RX ADMIN — POLYETHYLENE GLYCOL 3350 17 GRAM(S): 17 POWDER, FOR SOLUTION ORAL at 12:15

## 2023-05-16 RX ADMIN — QUETIAPINE FUMARATE 12.5 MILLIGRAM(S): 200 TABLET, FILM COATED ORAL at 00:28

## 2023-05-16 RX ADMIN — PANTOPRAZOLE SODIUM 40 MILLIGRAM(S): 20 TABLET, DELAYED RELEASE ORAL at 12:58

## 2023-05-16 RX ADMIN — Medication 5.63 MICROGRAM(S)/KG/MIN: at 12:09

## 2023-05-16 RX ADMIN — AMIODARONE HYDROCHLORIDE 16.7 MG/MIN: 400 TABLET ORAL at 08:10

## 2023-05-16 RX ADMIN — CHLORHEXIDINE GLUCONATE 1 APPLICATION(S): 213 SOLUTION TOPICAL at 05:24

## 2023-05-16 NOTE — CONSULT NOTE ADULT - TIME BILLING
I have personally seen and examined this patient.    I have reviewed all pertinent clinical information and reviewed all relevant imaging and diagnostic studies personally.   I counseled the patient about diagnostic testing and treatment plan. All questions were answered.   I discussed recommendations with the primary team.
Atrial Fibrillation

## 2023-05-16 NOTE — PROGRESS NOTE ADULT - ASSESSMENT
77y F pmh NSCLC stage 3 s/p CRT/surgery 2018, emphysema, severe MR s/p MVR, pAFib s/p coumadin s/p MVR and KOBY closure, past smoker presenting with dyspnea on exertion and cough x 2 weeks admitted to CCU for cardiogenic vs septic shock.     #Cardiogenic/Septic shock   #Suspected aspiration pna   #pAfib RVR  #Severe MR s/p MVR and KOBY closure   - s/p cardizem and lopressor in ER for Afib RVR, rapid response and upgrade to CCU 5/13 for AMS and hypotension   - TTE 5/13 - diffuse hypokinesis of anterior wall, EF 25-30%  - intubated for airway protection, wean as tolerated (SAT/SBT)  - CTA chest - no PE, b/l pleural effusions and ggo consistent with pulmonary edema  - CTH - no acute intracranial pathology    - TSH 7.26   - lactate and troponin wnl   - c/w levophed ggt, off dobutamine, wean as tolerated   - c/w amio ggt  - c/w cefepime [id number] IV, vancomycin d/c - MRSA -ve  - ID following, likely natali,;  Procal 0.03, deep tracheal aspirate, Bcx, w/NGTD  - UA negative  - IVC 2.0, collapsible  - consider cardioversion      #Transaminitis   - pending RUQ US  - trend LFTs qD    #Anxiety   - c/w sertraline 25mg     # Misc  - DVT Prophylaxis: prophylactic dose  - GI Prophylaxis: pantoprazole  Injectable 40 milliGRAM(s) IV Push daily, on Bowel Regimen  - Diet: NPO   - Activity: Bedrest  - Code Status: Full

## 2023-05-16 NOTE — CONSULT NOTE ADULT - ASSESSMENT
77y Female with h/o NSCLC, CRT 2017, s/p VATS, emphysema, former smoker, AFib diagnosed 2017, was started on Amiodarone at that time and Coumadin for stroke prevention, severe MR, MVR 2017 with KOBY sutured over in two layers at the time, admitted with progressively worsening dyspnea.   AF RVR, MS changes, respiratory failure, was intubated, now extubated  Afebrile, RVP pending    AF RVR  [id number] Score is 3-4  acute HF exacerbation  Cardiomyopathy  MVR, KOBY sutured  h/o lung CA    con't tele  con't amiodarone  TSH 7.26, check Free T4  off pressors, consider starting beta blockers  AMANDEEP/DCCV when stable for procedure / sedation  con't therapeutic AC

## 2023-05-16 NOTE — SWALLOW BEDSIDE ASSESSMENT ADULT - SLP PERTINENT HISTORY OF CURRENT PROBLEM
Pt is a 76 y/o F w. PMHx: NSCLC stage 3 s/p CRT/surgery 2018, emphysema, severe MR s/p MVR, pAFib s/p coumadin s/p MVR and KOBY closure, past smoker p/w dyspnea on exertion and cough x2 weeks admitted to CCU for cardiogenic vs. septic shock, AHRF, fevers-possible viral infection; Pt intubated 5/13 and s/p extubation 5/15. Pt is a 78 y/o F w/ PMHx: NSCLC stage 3 s/p CRT/surgery 2018, emphysema, severe MR s/p MVR, pAFib s/p coumadin s/p MVR and KOBY closure, past smoker p/w dyspnea on exertion and cough x2 weeks admitted to CCU for cardiogenic vs. septic shock, AHRF, fevers-possible viral infection; Pt intubated 5/13 and s/p extubation 5/15.

## 2023-05-16 NOTE — SWALLOW BEDSIDE ASSESSMENT ADULT - SLP GENERAL OBSERVATIONS
pt received in bed awake alert +generalized weakness w/o c/o pain. +4L NC pt received in bed awake alert +generalized weakness w/o c/o pain. +4L NC +son at bedside, denies hx of dysphagia

## 2023-05-16 NOTE — CONSULT NOTE ADULT - SUBJECTIVE AND OBJECTIVE BOX
Patient is a 77y old  Female who presents with a chief complaint of atrial fib (16 May 2023 08:56)        HPI:  76yo F hx of NSCLC stage 3 s/p CRT and surgery 2018, emphysema, severe mitral valve regurgitation s/p MVR, paroxysmal atrial fibrillation previously on coumadin but stopped after MVR and KOBY closure, former smoker presenting with progressive shortness of breath on exertion, and intermittent dry cough lasting for past 2 weeks. Denies chest pain, palpitations, headaches, wheezing, abdominal pain, fevers, sick contacts, nausea/vomiting. Was prescribed meds from PMD without symptomatic relief. Presented to the ED from home.    At the ED, VS- T: 36.7C, BP: 90/70, HR: 160s, SpO2: 99% on RA, RR: 16. Labs significant for BNP- 6227, and elevated AST/ALT (54/60). Troponin<0.01 x1. EKG showed atrial fibrillation with RVR. CT Chest NC showed unchanged RLL GGO.     Admitted to telemetry for afib with RVR. Given 60mg x1 therapeutic lovenox. 1L LR bolus. For afib with  RVR, was given 20mg IVP diltiazem and 60mg PO. HR improved to 94.  (12 May 2023 23:20)      Electrophysiology:  77y Female    REVIEW OF SYSTEMS    [ ] A ten-point review of systems was otherwise negative except as noted.  [ ] Due to altered mental status/intubation, subjective information were not able to be obtained from the patient. History was obtained, to the extent possible, from review of the chart and collateral sources of information.      PAST MEDICAL & SURGICAL HISTORY:  HTN (hypertension)      Paroxysmal atrial fibrillation      Non-small cell lung cancer      H/O mitral valve replacement      Severe mitral valve regurgitation          Home Medications:  metoprolol succinate 25 mg oral tablet, extended release: 1 tab(s) orally once a day (13 May 2023 00:17)  sertraline 25 mg oral tablet: 1 tab(s) orally once a day (13 May 2023 00:17)      Allergies:  No Known Allergies      FAMILY HISTORY:      SOCIAL HISTORY:    CIGARETTES:  ALCOHOL:  MARIJUANA:  ILLICIT DRUGS:        PREVIOUS DIAGNOSTIC TESTING:      ECHO  FINDINGS:    STRESS  FINDINGS:    CATHETERIZATION  FINDINGS:    ELECTROPHYSIOLOGY STUDY  FINDINGS:    CAROTID ULTRASOUND:  FINDINGS    VENOUS DUPLEX SCAN:  FINDINGS:    CHEST CT PULMONARY ANGIO with IV Contrast:  FINDINGS:      MEDICATIONS  (STANDING):  aMIOdarone Infusion 0.5 mG/Min (16.7 mL/Hr) IV Continuous <Continuous>  aMIOdarone Infusion 0.501 mG/Min (16.7 mL/Hr) IV Continuous <Continuous>  cefepime   IVPB      cefepime   IVPB 2000 milliGRAM(s) IV Intermittent every 12 hours  chlorhexidine 0.12% Liquid 15 milliLiter(s) Oral Mucosa two times a day  chlorhexidine 2% Cloths 1 Application(s) Topical <User Schedule>  dexMEDEtomidine Infusion 0.3 MICROgram(s)/kG/Hr (4.5 mL/Hr) IV Continuous <Continuous>  enoxaparin Injectable 60 milliGRAM(s) SubCutaneous every 12 hours  fentaNYL   Infusion. 0.5 MICROgram(s)/kG/Hr (2.84 mL/Hr) IV Continuous <Continuous>  ipratropium  for Nebulization. 500 MICROGram(s) Nebulizer once  ipratropium 17 MICROgram(s) HFA Inhaler 1 Puff(s) Inhalation once  norepinephrine Infusion 0.05 MICROgram(s)/kG/Min (5.63 mL/Hr) IV Continuous <Continuous>  pantoprazole  Injectable 40 milliGRAM(s) IV Push daily  polyethylene glycol 3350 17 Gram(s) Oral daily  senna 2 Tablet(s) Oral at bedtime  sertraline 25 milliGRAM(s) Oral daily  sodium chloride 0.9%. 1000 milliLiter(s) (50 mL/Hr) IV Continuous <Continuous>    MEDICATIONS  (PRN):      Vital Signs Last 24 Hrs  T(C): 36.7 (16 May 2023 12:00), Max: 36.9 (16 May 2023 00:00)  T(F): 98.1 (16 May 2023 12:00), Max: 98.5 (16 May 2023 04:00)  HR: 133 (16 May 2023 12:00) (122 - 143)  BP: --  BP(mean): --  RR: 27 (16 May 2023 12:00) (18 - 66)  SpO2: 99% (16 May 2023 12:00) (96% - 100%)    Parameters below as of 16 May 2023 08:00  Patient On (Oxygen Delivery Method): nasal cannula  O2 Flow (L/min): 4      PHYSICAL EXAM:    GENERAL: In no apparent distress, well nourished, and hydrated.  HEAD:  Atraumatic, Normocephalic  EYES: EOMI, PERRLA, conjunctiva and sclera clear  NECK: Supple and normal thyroid.  No JVD or carotid bruit.  Carotid pulse is 2+ bilaterally.  HEART: Regular rate and rhythm; No murmurs, rubs, or gallops.  PULMONARY: Clear to auscultation and perfusion.  No rales, wheezing, or rhonchi bilaterally.  ABDOMEN: Soft, Nontender, Nondistended; Bowel sounds present  EXTREMITIES:  2+ Peripheral Pulses, No clubbing, cyanosis, or edema  NEUROLOGICAL: Grossly nonfocal    I&O's Detail    15 May 2023 07:01  -  16 May 2023 07:00  --------------------------------------------------------  IN:    Amiodarone: 400.8 mL    Dexmedetomidine: 121.5 mL    FentaNYL: 56 mL    IV PiggyBack: 450 mL    Norepinephrine: 153.5 mL  Total IN: 1181.8 mL    OUT:    Indwelling Catheter - Urethral (mL):  mL  Total OUT:  mL    Total NET: -813.2 mL      16 May 2023 07:01  -  16 May 2023 14:15  --------------------------------------------------------  IN:    Amiodarone: 100.2 mL    Dexmedetomidine: 9 mL    IV PiggyBack: 200 mL    Norepinephrine: 22 mL    sodium chloride 0.9%: 150 mL  Total IN: 481.2 mL    OUT:    Indwelling Catheter - Urethral (mL): 190 mL  Total OUT: 190 mL    Total NET: 291.2 mL        Daily Height in cm: 157.5 (15 May 2023 16:49)    Daily Weight in k (16 May 2023 07:00)    INTERPRETATION OF TELEMETRY:    EC Lead ECG:   Ventricular Rate 140 BPM    Atrial Rate 129 BPM    QRS Duration 122 ms    Q-T Interval 380 ms    QTC Calculation(Bazett) 580 ms    R Axis -42 degrees    T Axis 191 degrees    Diagnosis Line Sinus tachycardia  Left axis deviation  Left bundle branchblock  Abnormal ECG    Confirmed by LOYD MARADIAGA MD ([id number]) on 2023 6:49:57 AM (23 @ 05:35)  12 Lead ECG:   Ventricular Rate 119 BPM    Atrial Rate 59 BPM    QRS Duration 124 ms    Q-T Interval 354 ms    QTC Calculation(Bazett) 497 ms    R Axis 19 degrees    T Axis 168 degrees    Diagnosis Line Wide QRS tachycardia  Possible Atrial flutter  Left bundle branch block  Abnormal ECG    Confirmed by Fernanda Driscoll MD ([id number]) on 5/15/2023 7:46:01 AM (05-15-23 @ 05:17)  12 Lead ECG:   Ventricular Rate 122 BPM    Atrial Rate 107 BPM    QRS Duration 122 ms    Q-T Interval 364 ms    QTC Calculation(Bazett) 518 ms    R Axis -28 degrees    T Axis 116 degrees    Diagnosis Line Atrial fibrillation with rapid ventricular response with premature ventricular  or aberrantly conducted complexes  Non-specific intra-ventricular conduction delay  Abnormal ECG    Confirmed by Silvestre Henao ([id number]) on 2023 12:48:14 PM (23 @ 05:21)        LABS:                        12.9   14.11 )-----------( 341      ( 16 May 2023 04:45 )             36.9     05-16    136  |  93<L>  |  25<H>  ----------------------------<  104<H>  3.5   |  26  |  1.1    Ca    9.2      16 May 2023 04:45  Phos  2.6     -  Mg     1.7     -    TPro  6.1  /  Alb  3.4<L>  /  TBili  1.3<H>  /  DBili  x   /  AST  24  /  ALT  31  /  AlkPhos  106  05-16    CARDIAC MARKERS ( 15 May 2023 15:46 )  x     / <0.01 ng/mL / x     / x     / x              BNP      Culture - Sputum (collected 23 @ 14:18)  Source: Trach Asp Tracheal Aspirate  Gram Stain (05-15-23 @ 03:46):    Moderate polymorphonuclear leukocytes per low power field    No Squamous epithelial cells per low power field    No organisms seen per oil power field  Preliminary Report (05-15-23 @ 19:43):    No growth to date.    Culture - Blood (collected 23 @ 04:49)  Source: .Blood Blood  Preliminary Report (05-15-23 @ 18:02):    No growth to date.            RADIOLOGY & ADDITIONAL STUDIES:       Patient is a 77y old  Female who presents with a chief complaint of atrial fib (16 May 2023 08:56)        HPI:  76yo F hx of NSCLC stage 3 s/p CRT and surgery 2018, emphysema, severe mitral valve regurgitation s/p MVR, paroxysmal atrial fibrillation previously on coumadin but stopped after MVR and KOBY closure, former smoker presenting with progressive shortness of breath on exertion, and intermittent dry cough lasting for past 2 weeks. Denies chest pain, palpitations, headaches, wheezing, abdominal pain, fevers, sick contacts, nausea/vomiting. Was prescribed meds from PMD without symptomatic relief. Presented to the ED from home.    At the ED, VS- T: 36.7C, BP: 90/70, HR: 160s, SpO2: 99% on RA, RR: 16. Labs significant for BNP- 6227, and elevated AST/ALT (54/60). Troponin<0.01 x1. EKG showed atrial fibrillation with RVR. CT Chest NC showed unchanged RLL GGO.     Admitted to telemetry for afib with RVR. Given 60mg x1 therapeutic lovenox. 1L LR bolus. For afib with  RVR, was given 20mg IVP diltiazem and 60mg PO. HR improved to 94.  (12 May 2023 23:20)      Electrophysiology:  77y Female with h/o NSCLC, CRT 2018, s/p VATS, emphysema, former smoker, AFib diagnosed 2017, was started on Amiodarone at that time and Coumadin for stroke prevention    REVIEW OF SYSTEMS    [ ] A ten-point review of systems was otherwise negative except as noted.  [ ] Due to altered mental status/intubation, subjective information were not able to be obtained from the patient. History was obtained, to the extent possible, from review of the chart and collateral sources of information.      PAST MEDICAL & SURGICAL HISTORY:  HTN (hypertension)      Paroxysmal atrial fibrillation      Non-small cell lung cancer      H/O mitral valve replacement      Severe mitral valve regurgitation          Home Medications:  metoprolol succinate 25 mg oral tablet, extended release: 1 tab(s) orally once a day (13 May 2023 00:17)  sertraline 25 mg oral tablet: 1 tab(s) orally once a day (13 May 2023 00:17)      Allergies:  No Known Allergies      FAMILY HISTORY:      SOCIAL HISTORY:    CIGARETTES:  ALCOHOL:  MARIJUANA:  ILLICIT DRUGS:        PREVIOUS DIAGNOSTIC TESTING:      ECHO  FINDINGS:    STRESS  FINDINGS:    CATHETERIZATION  FINDINGS:    ELECTROPHYSIOLOGY STUDY  FINDINGS:    CAROTID ULTRASOUND:  FINDINGS    VENOUS DUPLEX SCAN:  FINDINGS:    CHEST CT PULMONARY ANGIO with IV Contrast:  FINDINGS:      MEDICATIONS  (STANDING):  aMIOdarone Infusion 0.5 mG/Min (16.7 mL/Hr) IV Continuous <Continuous>  aMIOdarone Infusion 0.501 mG/Min (16.7 mL/Hr) IV Continuous <Continuous>  cefepime   IVPB      cefepime   IVPB 2000 milliGRAM(s) IV Intermittent every 12 hours  chlorhexidine 0.12% Liquid 15 milliLiter(s) Oral Mucosa two times a day  chlorhexidine 2% Cloths 1 Application(s) Topical <User Schedule>  dexMEDEtomidine Infusion 0.3 MICROgram(s)/kG/Hr (4.5 mL/Hr) IV Continuous <Continuous>  enoxaparin Injectable 60 milliGRAM(s) SubCutaneous every 12 hours  fentaNYL   Infusion. 0.5 MICROgram(s)/kG/Hr (2.84 mL/Hr) IV Continuous <Continuous>  ipratropium  for Nebulization. 500 MICROGram(s) Nebulizer once  ipratropium 17 MICROgram(s) HFA Inhaler 1 Puff(s) Inhalation once  norepinephrine Infusion 0.05 MICROgram(s)/kG/Min (5.63 mL/Hr) IV Continuous <Continuous>  pantoprazole  Injectable 40 milliGRAM(s) IV Push daily  polyethylene glycol 3350 17 Gram(s) Oral daily  senna 2 Tablet(s) Oral at bedtime  sertraline 25 milliGRAM(s) Oral daily  sodium chloride 0.9%. 1000 milliLiter(s) (50 mL/Hr) IV Continuous <Continuous>    MEDICATIONS  (PRN):      Vital Signs Last 24 Hrs  T(C): 36.7 (16 May 2023 12:00), Max: 36.9 (16 May 2023 00:00)  T(F): 98.1 (16 May 2023 12:00), Max: 98.5 (16 May 2023 04:00)  HR: 133 (16 May 2023 12:00) (122 - 143)  BP: --  BP(mean): --  RR: 27 (16 May 2023 12:00) (18 - 66)  SpO2: 99% (16 May 2023 12:00) (96% - 100%)    Parameters below as of 16 May 2023 08:00  Patient On (Oxygen Delivery Method): nasal cannula  O2 Flow (L/min): 4      PHYSICAL EXAM:    GENERAL: In no apparent distress, well nourished, and hydrated.  HEAD:  Atraumatic, Normocephalic  EYES: EOMI, PERRLA, conjunctiva and sclera clear  NECK: Supple and normal thyroid.  No JVD or carotid bruit.  Carotid pulse is 2+ bilaterally.  HEART: Regular rate and rhythm; No murmurs, rubs, or gallops.  PULMONARY: Clear to auscultation and perfusion.  No rales, wheezing, or rhonchi bilaterally.  ABDOMEN: Soft, Nontender, Nondistended; Bowel sounds present  EXTREMITIES:  2+ Peripheral Pulses, No clubbing, cyanosis, or edema  NEUROLOGICAL: Grossly nonfocal    I&O's Detail    15 May 2023 07:01  -  16 May 2023 07:00  --------------------------------------------------------  IN:    Amiodarone: 400.8 mL    Dexmedetomidine: 121.5 mL    FentaNYL: 56 mL    IV PiggyBack: 450 mL    Norepinephrine: 153.5 mL  Total IN: 1181.8 mL    OUT:    Indwelling Catheter - Urethral (mL): 1995 mL  Total OUT: 1995 mL    Total NET: -813.2 mL      16 May 2023 07:01  -  16 May 2023 14:15  --------------------------------------------------------  IN:    Amiodarone: 100.2 mL    Dexmedetomidine: 9 mL    IV PiggyBack: 200 mL    Norepinephrine: 22 mL    sodium chloride 0.9%: 150 mL  Total IN: 481.2 mL    OUT:    Indwelling Catheter - Urethral (mL): 190 mL  Total OUT: 190 mL    Total NET: 291.2 mL        Daily Height in cm: 157.5 (15 May 2023 16:49)    Daily Weight in k (16 May 2023 07:00)    INTERPRETATION OF TELEMETRY:    EC Lead ECG:   Ventricular Rate 140 BPM    Atrial Rate 129 BPM    QRS Duration 122 ms    Q-T Interval 380 ms    QTC Calculation(Bazett) 580 ms    R Axis -42 degrees    T Axis 191 degrees    Diagnosis Line Sinus tachycardia  Left axis deviation  Left bundle branchblock  Abnormal ECG    Confirmed by LOYD MARADIAGA MD ([id number]) on 2023 6:49:57 AM (23 @ 05:35)  12 Lead ECG:   Ventricular Rate 119 BPM    Atrial Rate 59 BPM    QRS Duration 124 ms    Q-T Interval 354 ms    QTC Calculation(Bazett) 497 ms    R Axis 19 degrees    T Axis 168 degrees    Diagnosis Line Wide QRS tachycardia  Possible Atrial flutter  Left bundle branch block  Abnormal ECG    Confirmed by Fernanda Driscoll MD ([id number]) on 5/15/2023 7:46:01 AM (05-15-23 @ 05:17)  12 Lead ECG:   Ventricular Rate 122 BPM    Atrial Rate 107 BPM    QRS Duration 122 ms    Q-T Interval 364 ms    QTC Calculation(Bazett) 518 ms    R Axis -28 degrees    T Axis 116 degrees    Diagnosis Line Atrial fibrillation with rapid ventricular response with premature ventricular  or aberrantly conducted complexes  Non-specific intra-ventricular conduction delay  Abnormal ECG    Confirmed by Silvestre Henao ([id number]) on 2023 12:48:14 PM (23 @ 05:21)        LABS:                        12.9   14.11 )-----------( 341      ( 16 May 2023 04:45 )             36.9     -    136  |  93<L>  |  25<H>  ----------------------------<  104<H>  3.5   |  26  |  1.1    Ca    9.2      16 May 2023 04:45  Phos  2.6       Mg     1.7         TPro  6.1  /  Alb  3.4<L>  /  TBili  1.3<H>  /  DBili  x   /  AST  24  /  ALT  31  /  AlkPhos  106      CARDIAC MARKERS ( 15 May 2023 15:46 )  x     / <0.01 ng/mL / x     / x     / x              BNP      Culture - Sputum (collected 23 @ 14:18)  Source: Trach Asp Tracheal Aspirate  Gram Stain (05-15-23 @ 03:46):    Moderate polymorphonuclear leukocytes per low power field    No Squamous epithelial cells per low power field    No organisms seen per oil power field  Preliminary Report (05-15-23 @ 19:43):    No growth to date.    Culture - Blood (collected 23 @ 04:49)  Source: .Blood Blood  Preliminary Report (05-15-23 @ 18:02):    No growth to date.            RADIOLOGY & ADDITIONAL STUDIES:       Patient is a 77y old  Female who presents with a chief complaint of atrial fib (16 May 2023 08:56)        HPI:  76yo F hx of NSCLC stage 3 s/p CRT and surgery 2018, emphysema, severe mitral valve regurgitation s/p MVR, paroxysmal atrial fibrillation previously on coumadin but stopped after MVR and KOBY closure, former smoker presenting with progressive shortness of breath on exertion, and intermittent dry cough lasting for past 2 weeks. Denies chest pain, palpitations, headaches, wheezing, abdominal pain, fevers, sick contacts, nausea/vomiting. Was prescribed meds from PMD without symptomatic relief. Presented to the ED from home.    At the ED, VS- T: 36.7C, BP: 90/70, HR: 160s, SpO2: 99% on RA, RR: 16. Labs significant for BNP- 6227, and elevated AST/ALT (54/60). Troponin<0.01 x1. EKG showed atrial fibrillation with RVR. CT Chest NC showed unchanged RLL GGO.     Admitted to telemetry for afib with RVR. Given 60mg x1 therapeutic lovenox. 1L LR bolus. For afib with  RVR, was given 20mg IVP diltiazem and 60mg PO. HR improved to 94.  (12 May 2023 23:20)      Electrophysiology:  77y Female with h/o NSCLC, CRT 2018, s/p VATS, emphysema, former smoker, AFib diagnosed 2017, was started on Amiodarone at that time and Coumadin for stroke prevention, severe MR, MVR 2018 with KOBY clip at the time, admitted with progressively worsening dyspnea on exertional and rest with dry cough, worsening over last 2 weeks. In ED was found in AF RVR, unclear persistent, recurrent, or paroxysmal as patient did not follow up. While in ED became dyspnic Rapid was called, patient was intubated. Remained in AF RVR    REVIEW OF SYSTEMS    [ ] A ten-point review of systems was otherwise negative except as noted.  [ ] Due to altered mental status/intubation, subjective information were not able to be obtained from the patient. History was obtained, to the extent possible, from review of the chart and collateral sources of information.      PAST MEDICAL & SURGICAL HISTORY:  HTN (hypertension)      Paroxysmal atrial fibrillation      Non-small cell lung cancer      H/O mitral valve replacement      Severe mitral valve regurgitation          Home Medications:  metoprolol succinate 25 mg oral tablet, extended release: 1 tab(s) orally once a day (13 May 2023 00:17)  sertraline 25 mg oral tablet: 1 tab(s) orally once a day (13 May 2023 00:17)      Allergies:  No Known Allergies      FAMILY HISTORY:      SOCIAL HISTORY:    CIGARETTES:  ALCOHOL:  MARIJUANA:  ILLICIT DRUGS:        PREVIOUS DIAGNOSTIC TESTING:      ECHO  FINDINGS:    STRESS  FINDINGS:    CATHETERIZATION  FINDINGS:    ELECTROPHYSIOLOGY STUDY  FINDINGS:    CAROTID ULTRASOUND:  FINDINGS    VENOUS DUPLEX SCAN:  FINDINGS:    CHEST CT PULMONARY ANGIO with IV Contrast:  FINDINGS:      MEDICATIONS  (STANDING):  aMIOdarone Infusion 0.5 mG/Min (16.7 mL/Hr) IV Continuous <Continuous>  aMIOdarone Infusion 0.501 mG/Min (16.7 mL/Hr) IV Continuous <Continuous>  cefepime   IVPB      cefepime   IVPB 2000 milliGRAM(s) IV Intermittent every 12 hours  chlorhexidine 0.12% Liquid 15 milliLiter(s) Oral Mucosa two times a day  chlorhexidine 2% Cloths 1 Application(s) Topical <User Schedule>  dexMEDEtomidine Infusion 0.3 MICROgram(s)/kG/Hr (4.5 mL/Hr) IV Continuous <Continuous>  enoxaparin Injectable 60 milliGRAM(s) SubCutaneous every 12 hours  fentaNYL   Infusion. 0.5 MICROgram(s)/kG/Hr (2.84 mL/Hr) IV Continuous <Continuous>  ipratropium  for Nebulization. 500 MICROGram(s) Nebulizer once  ipratropium 17 MICROgram(s) HFA Inhaler 1 Puff(s) Inhalation once  norepinephrine Infusion 0.05 MICROgram(s)/kG/Min (5.63 mL/Hr) IV Continuous <Continuous>  pantoprazole  Injectable 40 milliGRAM(s) IV Push daily  polyethylene glycol 3350 17 Gram(s) Oral daily  senna 2 Tablet(s) Oral at bedtime  sertraline 25 milliGRAM(s) Oral daily  sodium chloride 0.9%. 1000 milliLiter(s) (50 mL/Hr) IV Continuous <Continuous>    MEDICATIONS  (PRN):      Vital Signs Last 24 Hrs  T(C): 36.7 (16 May 2023 12:00), Max: 36.9 (16 May 2023 00:00)  T(F): 98.1 (16 May 2023 12:00), Max: 98.5 (16 May 2023 04:00)  HR: 133 (16 May 2023 12:00) (122 - 143)  BP: --  BP(mean): --  RR: 27 (16 May 2023 12:00) (18 - 66)  SpO2: 99% (16 May 2023 12:00) (96% - 100%)    Parameters below as of 16 May 2023 08:00  Patient On (Oxygen Delivery Method): nasal cannula  O2 Flow (L/min): 4      PHYSICAL EXAM:    GENERAL: In no apparent distress, well nourished, and hydrated.  HEAD:  Atraumatic, Normocephalic  EYES: EOMI, PERRLA, conjunctiva and sclera clear  NECK: Supple and normal thyroid.  No JVD or carotid bruit.  Carotid pulse is 2+ bilaterally.  HEART: Regular rate and rhythm; No murmurs, rubs, or gallops.  PULMONARY: Clear to auscultation and perfusion.  No rales, wheezing, or rhonchi bilaterally.  ABDOMEN: Soft, Nontender, Nondistended; Bowel sounds present  EXTREMITIES:  2+ Peripheral Pulses, No clubbing, cyanosis, or edema  NEUROLOGICAL: Grossly nonfocal    I&O's Detail    15 May 2023 07:  -  16 May 2023 07:00  --------------------------------------------------------  IN:    Amiodarone: 400.8 mL    Dexmedetomidine: 121.5 mL    FentaNYL: 56 mL    IV PiggyBack: 450 mL    Norepinephrine: 153.5 mL  Total IN: 1181.8 mL    OUT:    Indwelling Catheter - Urethral (mL):  mL  Total OUT:  mL    Total NET: -813.2 mL      16 May 2023 07:01  -  16 May 2023 14:15  --------------------------------------------------------  IN:    Amiodarone: 100.2 mL    Dexmedetomidine: 9 mL    IV PiggyBack: 200 mL    Norepinephrine: 22 mL    sodium chloride 0.9%: 150 mL  Total IN: 481.2 mL    OUT:    Indwelling Catheter - Urethral (mL): 190 mL  Total OUT: 190 mL    Total NET: 291.2 mL        Daily Height in cm: 157.5 (15 May 2023 16:49)    Daily Weight in k (16 May 2023 07:00)    INTERPRETATION OF TELEMETRY:    EC Lead ECG:   Ventricular Rate 140 BPM    Atrial Rate 129 BPM    QRS Duration 122 ms    Q-T Interval 380 ms    QTC Calculation(Bazett) 580 ms    R Axis -42 degrees    T Axis 191 degrees    Diagnosis Line Sinus tachycardia  Left axis deviation  Left bundle branchblock  Abnormal ECG    Confirmed by LOYD MARADIAGA MD ([id number]) on 2023 6:49:57 AM (23 @ 05:35)  12 Lead ECG:   Ventricular Rate 119 BPM    Atrial Rate 59 BPM    QRS Duration 124 ms    Q-T Interval 354 ms    QTC Calculation(Bazett) 497 ms    R Axis 19 degrees    T Axis 168 degrees    Diagnosis Line Wide QRS tachycardia  Possible Atrial flutter  Left bundle branch block  Abnormal ECG    Confirmed by Fernanda Driscoll MD ([id number]) on 5/15/2023 7:46:01 AM (05-15-23 @ 05:17)  12 Lead ECG:   Ventricular Rate 122 BPM    Atrial Rate 107 BPM    QRS Duration 122 ms    Q-T Interval 364 ms    QTC Calculation(Bazett) 518 ms    R Axis -28 degrees    T Axis 116 degrees    Diagnosis Line Atrial fibrillation with rapid ventricular response with premature ventricular  or aberrantly conducted complexes  Non-specific intra-ventricular conduction delay  Abnormal ECG    Confirmed by Silvestre Henao ([id number]) on 2023 12:48:14 PM (23 @ 05:21)        LABS:                        12.9   14.11 )-----------( 341      ( 16 May 2023 04:45 )             36.9         136  |  93<L>  |  25<H>  ----------------------------<  104<H>  3.5   |  26  |  1.1    Ca    9.2      16 May 2023 04:45  Phos  2.6       Mg     1.7         TPro  6.1  /  Alb  3.4<L>  /  TBili  1.3<H>  /  DBili  x   /  AST  24  /  ALT  31  /  AlkPhos  106      CARDIAC MARKERS ( 15 May 2023 15:46 )  x     / <0.01 ng/mL / x     / x     / x              BNP      Culture - Sputum (collected 23 @ 14:18)  Source: Trach Asp Tracheal Aspirate  Gram Stain (05-15-23 @ 03:46):    Moderate polymorphonuclear leukocytes per low power field    No Squamous epithelial cells per low power field    No organisms seen per oil power field  Preliminary Report (05-15-23 @ 19:43):    No growth to date.    Culture - Blood (collected 23 @ 04:49)  Source: .Blood Blood  Preliminary Report (05-15-23 @ 18:02):    No growth to date.            RADIOLOGY & ADDITIONAL STUDIES:       Patient is a 77y old  Female who presents with a chief complaint of atrial fib (16 May 2023 08:56)        HPI:  78yo F hx of NSCLC stage 3 s/p CRT and surgery 2018, emphysema, severe mitral valve regurgitation s/p MVR, paroxysmal atrial fibrillation previously on coumadin but stopped after MVR and KOBY closure, former smoker presenting with progressive shortness of breath on exertion, and intermittent dry cough lasting for past 2 weeks. Denies chest pain, palpitations, headaches, wheezing, abdominal pain, fevers, sick contacts, nausea/vomiting. Was prescribed meds from PMD without symptomatic relief. Presented to the ED from home.    At the ED, VS- T: 36.7C, BP: 90/70, HR: 160s, SpO2: 99% on RA, RR: 16. Labs significant for BNP- 6227, and elevated AST/ALT (54/60). Troponin<0.01 x1. EKG showed atrial fibrillation with RVR. CT Chest NC showed unchanged RLL GGO.     Admitted to telemetry for afib with RVR. Given 60mg x1 therapeutic lovenox. 1L LR bolus. For afib with  RVR, was given 20mg IVP diltiazem and 60mg PO. HR improved to 94.  (12 May 2023 23:20)      Electrophysiology:  77y Female with h/o NSCLC, CRT 2017, s/p VATS, emphysema, former smoker, AFib diagnosed 2017, was started on Amiodarone at that time and Coumadin for stroke prevention, severe MR, MVR 2017 with KOBY sutured over in two layers at the time, admitted with progressively worsening dyspnea on exertional and rest with dry cough, worsening over last 2 weeks. In ED was found in AF RVR, unclear persistent, recurrent, or paroxysmal as patient did not follow up. While in ED became dyspneic, with AF RVR and MS changes. Rapid was called, patient was intubated. Remained in AF RVR. Febrile, blood and tracheal cultures were negative, last fever , Viral culture pending. Remained in AF RVR, was planned for AMANDEEP/DCCV yesterday, was not done. Patient was successfully extubated. Remains in AF @130s. On Amiodaone IV, therapeutic Lovenox for stroke prevention, off pressors.    Echo revealed EF 25%, new, likely tachycardia related, large pleural effusion    REVIEW OF SYSTEMS    [x ] A ten-point review of systems was otherwise negative except as noted.  [ ] Due to altered mental status/intubation, subjective information were not able to be obtained from the patient. History was obtained, to the extent possible, from review of the chart and collateral sources of information.      PAST MEDICAL & SURGICAL HISTORY:  HTN (hypertension)      Paroxysmal atrial fibrillation      Non-small cell lung cancer      H/O mitral valve replacement      Severe mitral valve regurgitation          Home Medications:  metoprolol succinate 25 mg oral tablet, extended release: 1 tab(s) orally once a day (13 May 2023 00:17)  sertraline 25 mg oral tablet: 1 tab(s) orally once a day (13 May 2023 00:17)      Allergies:  No Known Allergies      FAMILY HISTORY: not contributory per chart      SOCIAL HISTORY: denies tobacco / ETOH / illicit drug use     CIGARETTES: former smoker   ALCOHOL:  MARIJUANA:  ILLICIT DRUGS:        PREVIOUS DIAGNOSTIC TESTING:      ECHO  FINDINGS:    < from: TTE Echo Complete w/o Contrast w/ Doppler (23 @ 09:37) >  Summary:   1. Diffuse hypokinesis with severe hypokinesis/akinesis of the anterior   wall and resultant severely depressed EF, by visual estimation, of 25 to   30%. The left ventricular diastolic function could not be assessed in   this study due to atrial fibrillation and mitral annuloplasty. Mild   concentric left ventricular hypertrophy.   2. Normal right ventricular size with moderately reducedsystolic   function.   3. Severe biatrial dilatation.   4. S/p mitral annuloplasty with mild regurgitation and no evidence of   stenosis (mean PG of 3.0mmHg at 86bpm).   5. No pericardial effusion.   6. A large pleural effusion is noted.    PHYSICIANINTERPRETATION:  Left Ventricle: There is mild concentric left ventricular hypertrophy.   Global LV systolic function was severely decreased. Left ventricular   ejection fraction, by visual estimation, is 25 to 30%. The left   ventricular diastolic function could not be assessed in this study.    < end of copied text >    < from: Transthoracic Echocardiogram (19 @ 12:08) >  Summary:   1. LV Ejection Fraction by Gamez's Method with a biplane EF of 61 %.   2. Normal left ventricular size and wall thicknesses, with normal   systolic and diastolic function.   3. No evidence of mitral valve regurgitation.   4. Thickening and calcification of the anterior mitral valve leaflet.   5. MV annuloplasty ring present.   6. Mild-moderate tricuspid regurgitation.   7. Normal trileaflet aortic valve with normal opening.    < end of copied text >    STRESS  FINDINGS:    CATHETERIZATION  FINDINGS:    ELECTROPHYSIOLOGY STUDY  FINDINGS:    CAROTID ULTRASOUND:  FINDINGS    VENOUS DUPLEX SCAN:  FINDINGS:    CHEST CT PULMONARY ANGIO with IV Contrast:  FINDINGS:      MEDICATIONS  (STANDING):  aMIOdarone Infusion 0.5 mG/Min (16.7 mL/Hr) IV Continuous <Continuous>  aMIOdarone Infusion 0.501 mG/Min (16.7 mL/Hr) IV Continuous <Continuous>  cefepime   IVPB      cefepime   IVPB 2000 milliGRAM(s) IV Intermittent every 12 hours  chlorhexidine 0.12% Liquid 15 milliLiter(s) Oral Mucosa two times a day  chlorhexidine 2% Cloths 1 Application(s) Topical <User Schedule>  dexMEDEtomidine Infusion 0.3 MICROgram(s)/kG/Hr (4.5 mL/Hr) IV Continuous <Continuous>  enoxaparin Injectable 60 milliGRAM(s) SubCutaneous every 12 hours  fentaNYL   Infusion. 0.5 MICROgram(s)/kG/Hr (2.84 mL/Hr) IV Continuous <Continuous>  ipratropium  for Nebulization. 500 MICROGram(s) Nebulizer once  ipratropium 17 MICROgram(s) HFA Inhaler 1 Puff(s) Inhalation once  norepinephrine Infusion 0.05 MICROgram(s)/kG/Min (5.63 mL/Hr) IV Continuous <Continuous>  pantoprazole  Injectable 40 milliGRAM(s) IV Push daily  polyethylene glycol 3350 17 Gram(s) Oral daily  senna 2 Tablet(s) Oral at bedtime  sertraline 25 milliGRAM(s) Oral daily  sodium chloride 0.9%. 1000 milliLiter(s) (50 mL/Hr) IV Continuous <Continuous>    MEDICATIONS  (PRN):      Vital Signs Last 24 Hrs  T(C): 36.7 (16 May 2023 12:00), Max: 36.9 (16 May 2023 00:00)  T(F): 98.1 (16 May 2023 12:00), Max: 98.5 (16 May 2023 04:00)  HR: 133 (16 May 2023 12:00) (122 - 143)  BP: --  BP(mean): --  RR: 27 (16 May 2023 12:00) (18 - 66)  SpO2: 99% (16 May 2023 12:00) (96% - 100%)    Parameters below as of 16 May 2023 08:00  Patient On (Oxygen Delivery Method): nasal cannula  O2 Flow (L/min): 4      PHYSICAL EXAM:    GENERAL: In no apparent distress, well nourished, and hydrated.  HEAD:  Atraumatic, Normocephalic  EYES: EOMI, PERRLA, conjunctiva and sclera clear  NECK: Supple and normal thyroid.  No JVD or carotid bruit.  Carotid pulse is 2+ bilaterally.  HEART: Regular rate and rhythm; No murmurs, rubs, or gallops.  PULMONARY: Clear to auscultation and perfusion.  No rales, wheezing, or rhonchi bilaterally.  ABDOMEN: Soft, Nontender, Nondistended; Bowel sounds present  EXTREMITIES:  2+ Peripheral Pulses, No clubbing, cyanosis, or edema  NEUROLOGICAL: Grossly nonfocal    I&O's Detail    15 May 2023 07:01  -  16 May 2023 07:00  --------------------------------------------------------  IN:    Amiodarone: 400.8 mL    Dexmedetomidine: 121.5 mL    FentaNYL: 56 mL    IV PiggyBack: 450 mL    Norepinephrine: 153.5 mL  Total IN: 1181.8 mL    OUT:    Indwelling Catheter - Urethral (mL):  mL  Total OUT: 1995 mL    Total NET: -813.2 mL      16 May 2023 07:01  -  16 May 2023 14:15  --------------------------------------------------------  IN:    Amiodarone: 100.2 mL    Dexmedetomidine: 9 mL    IV PiggyBack: 200 mL    Norepinephrine: 22 mL    sodium chloride 0.9%: 150 mL  Total IN: 481.2 mL    OUT:    Indwelling Catheter - Urethral (mL): 190 mL  Total OUT: 190 mL    Total NET: 291.2 mL        Daily Height in cm: 157.5 (15 May 2023 16:49)    Daily Weight in k (16 May 2023 07:00)    INTERPRETATION OF TELEMETRY:    EC Lead ECG:   Ventricular Rate 140 BPM    Atrial Rate 129 BPM    QRS Duration 122 ms    Q-T Interval 380 ms    QTC Calculation(Bazett) 580 ms    R Axis -42 degrees    T Axis 191 degrees    Diagnosis Line Sinus tachycardia  Left axis deviation  Left bundle branchblock  Abnormal ECG    Confirmed by LOYD MARADIAGA MD ([id number]) on 2023 6:49:57 AM (23 @ 05:35)  12 Lead ECG:   Ventricular Rate 119 BPM    Atrial Rate 59 BPM    QRS Duration 124 ms    Q-T Interval 354 ms    QTC Calculation(Bazett) 497 ms    R Axis 19 degrees    T Axis 168 degrees    Diagnosis Line Wide QRS tachycardia  Possible Atrial flutter  Left bundle branch block  Abnormal ECG    Confirmed by Fernanda Driscoll MD ([id number]) on 5/15/2023 7:46:01 AM (05-15-23 @ 05:17)  12 Lead ECG:   Ventricular Rate 122 BPM    Atrial Rate 107 BPM    QRS Duration 122 ms    Q-T Interval 364 ms    QTC Calculation(Bazett) 518 ms    R Axis -28 degrees    T Axis 116 degrees    Diagnosis Line Atrial fibrillation with rapid ventricular response with premature ventricular  or aberrantly conducted complexes  Non-specific intra-ventricular conduction delay  Abnormal ECG    Confirmed by Silvestre Henao ([id number]) on 2023 12:48:14 PM (23 @ 05:21)        LABS:                        12.9   14.11 )-----------( 341      ( 16 May 2023 04:45 )             36.9     05-16    136  |  93<L>  |  25<H>  ----------------------------<  104<H>  3.5   |  26  |  1.1    Ca    9.2      16 May 2023 04:45  Phos  2.6     05-  Mg     1.7         TPro  6.1  /  Alb  3.4<L>  /  TBili  1.3<H>  /  DBili  x   /  AST  24  /  ALT  31  /  AlkPhos  106  05-16    CARDIAC MARKERS ( 15 May 2023 15:46 )  x     / <0.01 ng/mL / x     / x     / x        Thyroid Stimulating Hormone, Serum: 7.26 uIU/mL (23 @ 06:12)          BNP      Culture - Sputum (collected 23 @ 14:18)  Source: Trach Asp Tracheal Aspirate  Gram Stain (05-15-23 @ 03:46):    Moderate polymorphonuclear leukocytes per low power field    No Squamous epithelial cells per low power field    No organisms seen per oil power field  Preliminary Report (05-15-23 @ 19:43):    No growth to date.    Culture - Blood (collected 23 @ 04:49)  Source: .Blood Blood  Preliminary Report (05-15-23 @ 18:02):    No growth to date.            RADIOLOGY & ADDITIONAL STUDIES:    < from: Xray Chest 1 View- PORTABLE-Routine (Xray Chest 1 View- PORTABLE-Routine in AM.) (23 @ 06:33) >  Findings:    Support devices: Interval extubation and removal of enteric tube.   Unchanged left IJ central venous catheter.    Cardiac/mediastinum/hilum: Unchanged.    Lung parenchyma/Pleura: Bilateral opacities/pleural effusions, unchanged.   Left apical bulla. Left upper lobe postsurgical changes.    Skeleton/soft tissues: Unchanged.    Impression:  Interval extubation and removal of enteric tube.  Bilateral opacities/pleural effusions, unchanged. Left apical bulla. Left   upper lobe postsurgical changes.    < end of copied text >    < from: CT Angio Chest PE Protocol w/ IV Cont (23 @ 05:43) >  FINDINGS:    PULMONARY EMBOLUS: No evidence of pulmonary embolus.    TUBES/LINES: Endotracheal tube tip 2 cm from the jackie. Left IJ central   venous catheter.    LUNGS, PLEURA, AIRWAYS: No significant change in left lung upper lobe   postsurgical changes with architectural distortion. Bilateral   emphysematous changes. Bilateral lung hazy groundglass opacification. New   small bilateral pleural effusions with overlying compressive atelectasis   is. Lung groundglass opacities, likely representing pulmonary edema. No   pneumothorax.    THORACIC NODES: No mediastinal or axillary lymphadenopathy.    MEDIASTINUM/GREAT VESSELS: No pericardial effusion. Biatrial heart   enlargement. Thoracic aorta atherosclerotic calcifications. Normal   caliber thoracic aorta. Mitral valve annular repair.    VISUALIZED UPPER ABDOMEN: Unremarkable.    BONES/SOFT TISSUES: No acute osseous abnormality. Post median sternotomy.      IMPRESSION:    1. Small bilateral pleural effusions. Lung groundglass opacities, likely   representing pulmonary edema. . Cardiomegaly. Correlation for CHF   recommended.    2. No evidence of acutepulmonary embolism.    < end of copied text >

## 2023-05-17 LAB
ALBUMIN SERPL ELPH-MCNC: 3.3 G/DL — LOW (ref 3.5–5.2)
ALP SERPL-CCNC: 94 U/L — SIGNIFICANT CHANGE UP (ref 30–115)
ALT FLD-CCNC: 23 U/L — SIGNIFICANT CHANGE UP (ref 0–41)
ANION GAP SERPL CALC-SCNC: 11 MMOL/L — SIGNIFICANT CHANGE UP (ref 7–14)
AST SERPL-CCNC: 24 U/L — SIGNIFICANT CHANGE UP (ref 0–41)
BASOPHILS # BLD AUTO: 0.02 K/UL — SIGNIFICANT CHANGE UP (ref 0–0.2)
BASOPHILS NFR BLD AUTO: 0.3 % — SIGNIFICANT CHANGE UP (ref 0–1)
BILIRUB SERPL-MCNC: 0.7 MG/DL — SIGNIFICANT CHANGE UP (ref 0.2–1.2)
BUN SERPL-MCNC: 24 MG/DL — HIGH (ref 10–20)
CALCIUM SERPL-MCNC: 9.1 MG/DL — SIGNIFICANT CHANGE UP (ref 8.4–10.5)
CHLORIDE SERPL-SCNC: 97 MMOL/L — LOW (ref 98–110)
CO2 SERPL-SCNC: 29 MMOL/L — SIGNIFICANT CHANGE UP (ref 17–32)
CREAT SERPL-MCNC: 1 MG/DL — SIGNIFICANT CHANGE UP (ref 0.7–1.5)
EGFR: 58 ML/MIN/1.73M2 — LOW
EOSINOPHIL # BLD AUTO: 0.06 K/UL — SIGNIFICANT CHANGE UP (ref 0–0.7)
EOSINOPHIL NFR BLD AUTO: 0.8 % — SIGNIFICANT CHANGE UP (ref 0–8)
GLUCOSE SERPL-MCNC: 115 MG/DL — HIGH (ref 70–99)
HCT VFR BLD CALC: 36.6 % — LOW (ref 37–47)
HGB BLD-MCNC: 12.1 G/DL — SIGNIFICANT CHANGE UP (ref 12–16)
IMM GRANULOCYTES NFR BLD AUTO: 0.4 % — HIGH (ref 0.1–0.3)
LYMPHOCYTES # BLD AUTO: 0.72 K/UL — LOW (ref 1.2–3.4)
LYMPHOCYTES # BLD AUTO: 9.2 % — LOW (ref 20.5–51.1)
MAGNESIUM SERPL-MCNC: 2.1 MG/DL — SIGNIFICANT CHANGE UP (ref 1.8–2.4)
MCHC RBC-ENTMCNC: 30.2 PG — SIGNIFICANT CHANGE UP (ref 27–31)
MCHC RBC-ENTMCNC: 33.1 G/DL — SIGNIFICANT CHANGE UP (ref 32–37)
MCV RBC AUTO: 91.3 FL — SIGNIFICANT CHANGE UP (ref 81–99)
MONOCYTES # BLD AUTO: 0.71 K/UL — HIGH (ref 0.1–0.6)
MONOCYTES NFR BLD AUTO: 9.1 % — SIGNIFICANT CHANGE UP (ref 1.7–9.3)
NEUTROPHILS # BLD AUTO: 6.3 K/UL — SIGNIFICANT CHANGE UP (ref 1.4–6.5)
NEUTROPHILS NFR BLD AUTO: 80.2 % — HIGH (ref 42.2–75.2)
NRBC # BLD: 0 /100 WBCS — SIGNIFICANT CHANGE UP (ref 0–0)
PLATELET # BLD AUTO: 269 K/UL — SIGNIFICANT CHANGE UP (ref 130–400)
PMV BLD: 9.1 FL — SIGNIFICANT CHANGE UP (ref 7.4–10.4)
POTASSIUM SERPL-MCNC: 3.4 MMOL/L — LOW (ref 3.5–5)
POTASSIUM SERPL-SCNC: 3.4 MMOL/L — LOW (ref 3.5–5)
PROT SERPL-MCNC: 5.9 G/DL — LOW (ref 6–8)
RBC # BLD: 4.01 M/UL — LOW (ref 4.2–5.4)
RBC # FLD: 13.7 % — SIGNIFICANT CHANGE UP (ref 11.5–14.5)
SODIUM SERPL-SCNC: 137 MMOL/L — SIGNIFICANT CHANGE UP (ref 135–146)
WBC # BLD: 7.84 K/UL — SIGNIFICANT CHANGE UP (ref 4.8–10.8)
WBC # FLD AUTO: 7.84 K/UL — SIGNIFICANT CHANGE UP (ref 4.8–10.8)

## 2023-05-17 PROCEDURE — 93010 ELECTROCARDIOGRAM REPORT: CPT

## 2023-05-17 PROCEDURE — 99233 SBSQ HOSP IP/OBS HIGH 50: CPT

## 2023-05-17 PROCEDURE — 71045 X-RAY EXAM CHEST 1 VIEW: CPT | Mod: 26

## 2023-05-17 RX ORDER — AMIODARONE HYDROCHLORIDE 400 MG/1
400 TABLET ORAL EVERY 8 HOURS
Refills: 0 | Status: DISCONTINUED | OUTPATIENT
Start: 2023-05-17 | End: 2023-05-23

## 2023-05-17 RX ORDER — LANOLIN ALCOHOL/MO/W.PET/CERES
3 CREAM (GRAM) TOPICAL AT BEDTIME
Refills: 0 | Status: DISCONTINUED | OUTPATIENT
Start: 2023-05-17 | End: 2023-05-23

## 2023-05-17 RX ORDER — METOPROLOL TARTRATE 50 MG
25 TABLET ORAL EVERY 6 HOURS
Refills: 0 | Status: DISCONTINUED | OUTPATIENT
Start: 2023-05-17 | End: 2023-05-23

## 2023-05-17 RX ADMIN — SERTRALINE 25 MILLIGRAM(S): 25 TABLET, FILM COATED ORAL at 11:15

## 2023-05-17 RX ADMIN — CEFEPIME 100 MILLIGRAM(S): 1 INJECTION, POWDER, FOR SOLUTION INTRAMUSCULAR; INTRAVENOUS at 06:18

## 2023-05-17 RX ADMIN — Medication 25 MILLIGRAM(S): at 11:14

## 2023-05-17 RX ADMIN — ENOXAPARIN SODIUM 60 MILLIGRAM(S): 100 INJECTION SUBCUTANEOUS at 06:19

## 2023-05-17 RX ADMIN — Medication 25 MILLIGRAM(S): at 17:53

## 2023-05-17 RX ADMIN — ENOXAPARIN SODIUM 60 MILLIGRAM(S): 100 INJECTION SUBCUTANEOUS at 17:52

## 2023-05-17 RX ADMIN — Medication 25 MILLIGRAM(S): at 23:03

## 2023-05-17 RX ADMIN — AMIODARONE HYDROCHLORIDE 400 MILLIGRAM(S): 400 TABLET ORAL at 11:14

## 2023-05-17 RX ADMIN — AMIODARONE HYDROCHLORIDE 400 MILLIGRAM(S): 400 TABLET ORAL at 21:33

## 2023-05-17 RX ADMIN — POLYETHYLENE GLYCOL 3350 17 GRAM(S): 17 POWDER, FOR SOLUTION ORAL at 11:15

## 2023-05-17 RX ADMIN — CHLORHEXIDINE GLUCONATE 15 MILLILITER(S): 213 SOLUTION TOPICAL at 06:21

## 2023-05-17 RX ADMIN — CHLORHEXIDINE GLUCONATE 15 MILLILITER(S): 213 SOLUTION TOPICAL at 17:52

## 2023-05-17 RX ADMIN — PANTOPRAZOLE SODIUM 40 MILLIGRAM(S): 20 TABLET, DELAYED RELEASE ORAL at 11:15

## 2023-05-17 RX ADMIN — CHLORHEXIDINE GLUCONATE 1 APPLICATION(S): 213 SOLUTION TOPICAL at 06:19

## 2023-05-17 RX ADMIN — SENNA PLUS 2 TABLET(S): 8.6 TABLET ORAL at 21:34

## 2023-05-17 RX ADMIN — CEFEPIME 100 MILLIGRAM(S): 1 INJECTION, POWDER, FOR SOLUTION INTRAMUSCULAR; INTRAVENOUS at 17:51

## 2023-05-17 NOTE — PROGRESS NOTE ADULT - SUBJECTIVE AND OBJECTIVE BOX
AROLDO KO  77y, Female  Allergy: No Known Allergies      LOS  5d    CHIEF COMPLAINT: atrial fib (16 May 2023 14:07)      INTERVAL EVENTS/HPI  - No acute events overnight  - T(F): , Max: 98.1 (05-16-23 @ 12:00)  - WBC Count: 7.84 (05-17-23 @ 04:50)  WBC Count: 14.11 (05-16-23 @ 04:45)     - Creatinine, Serum: 1.0 (05-17-23 @ 04:50)  Creatinine, Serum: 1.1 (05-16-23 @ 04:45)       ROS  General: Denies rigors, nightsweats  HEENT: Denies headache, rhinorrhea, sore throat, eye pain  CV: Denies CP, palpitations  PULM: Denies wheezing, hemoptysis  GI: Denies hematemesis, hematochezia, melena  : Denies discharge, hematuria  MSK: Denies arthralgias, myalgias  SKIN: Denies rash, lesions  NEURO: Denies paresthesias, weakness  PSYCH: Denies depression, anxiety    VITALS:  T(F): 96.7, Max: 98.1 (05-16-23 @ 12:00)  HR: 98  BP: --  RR: 18Vital Signs Last 24 Hrs  T(C): 35.9 (17 May 2023 08:00), Max: 36.7 (16 May 2023 12:00)  T(F): 96.7 (17 May 2023 08:00), Max: 98.1 (16 May 2023 12:00)  HR: 98 (17 May 2023 08:00) (82 - 139)  BP: --  BP(mean): --  RR: 18 (17 May 2023 08:00) (18 - 63)  SpO2: 99% (17 May 2023 08:00) (96% - 99%)    Parameters below as of 17 May 2023 08:00  Patient On (Oxygen Delivery Method): nasal cannula  O2 Flow (L/min): 3      PHYSICAL EXAM:  Gen: NAD, resting in bed  HEENT: Normocephalic, atraumatic  Neck: supple, no lymphadenopathy  CV: Regular rate & regular rhythm  Lungs: decreased BS at bases, no fremitus  Abdomen: Soft, BS present  Ext: Warm, well perfused  Neuro: non focal, awake  Skin: no rash, no erythema  Lines: no phlebitis    FH: Non-contributory  Social Hx: Non-contributory    TESTS & MEASUREMENTS:                        12.1   7.84  )-----------( 269      ( 17 May 2023 04:50 )             36.6     05-17    137  |  97<L>  |  24<H>  ----------------------------<  115<H>  3.4<L>   |  29  |  1.0    Ca    9.1      17 May 2023 04:50  Phos  2.6     05-16  Mg     2.1     05-17    TPro  5.9<L>  /  Alb  3.3<L>  /  TBili  0.7  /  DBili  x   /  AST  24  /  ALT  23  /  AlkPhos  94  05-17      LIVER FUNCTIONS - ( 17 May 2023 04:50 )  Alb: 3.3 g/dL / Pro: 5.9 g/dL / ALK PHOS: 94 U/L / ALT: 23 U/L / AST: 24 U/L / GGT: x               Culture - Sputum (collected 05-14-23 @ 14:18)  Source: Trach Asp Tracheal Aspirate  Gram Stain (05-15-23 @ 03:46):    Moderate polymorphonuclear leukocytes per low power field    No Squamous epithelial cells per low power field    No organisms seen per oil power field  Final Report (05-16-23 @ 18:24):    No growth at 48 hours    Culture - Blood (collected 05-14-23 @ 04:49)  Source: .Blood Blood  Preliminary Report (05-15-23 @ 18:02):    No growth to date.    Culture - Blood (collected 05-13-23 @ 02:56)  Source: .Blood Blood  Preliminary Report (05-14-23 @ 10:02):    No growth to date.        Blood Gas Venous - Lactate: 1.30 mmol/L (05-14-23 @ 14:50)  Lactate, Blood: 1.3 mmol/L (05-14-23 @ 04:15)  Lactate, Blood: 1.0 mmol/L (05-13-23 @ 23:44)  Lactate, Blood: 0.9 mmol/L (05-13-23 @ 20:27)  Blood Gas Venous - Lactate: 1.30 mmol/L (05-13-23 @ 13:23)  Lactate, Blood: 1.0 mmol/L (05-13-23 @ 11:00)  Lactate, Blood: 1.9 mmol/L (05-13-23 @ 06:12)  Blood Gas Venous - Lactate: 1.50 mmol/L (05-13-23 @ 06:02)  Blood Gas Venous - Lactate: 4.10 mmol/L (05-13-23 @ 04:06)      INFECTIOUS DISEASES TESTING  Rapid RVP Result: Detected (05-15-23 @ 11:55)  Procalcitonin, Serum: 0.26 (05-15-23 @ 04:15)  Procalcitonin, Serum: 0.23 (05-14-23 @ 11:41)  MRSA PCR Result.: Negative (05-14-23 @ 05:52)  Procalcitonin, Serum: 0.03 (05-13-23 @ 06:12)  COVID-19 PCR: NotDetec (05-12-23 @ 23:21)      INFLAMMATORY MARKERS      RADIOLOGY & ADDITIONAL TESTS:  I have personally reviewed the last available Chest xray  CXR      CT      CARDIOLOGY TESTING  12 Lead ECG:   Ventricular Rate 140 BPM    Atrial Rate 133 BPM    QRS Duration 120 ms    Q-T Interval 356 ms    QTC Calculation(Bazett) 543 ms    R Axis -38 degrees    T Axis 162 degrees    Diagnosis Line Atrial fibrillation with rapid ventricular response with premature ventricular  or aberrantly conducted complexes  Left axis deviation  Left bundle branch block  Abnormal ECG    Confirmed by Edu Sheikh ([id number]) on 5/16/2023 6:30:31 PM (05-16-23 @ 05:38)  12 Lead ECG:   Ventricular Rate 140 BPM    Atrial Rate 129 BPM    QRS Duration 122 ms    Q-T Interval 380 ms    QTC Calculation(Bazett) 580 ms    R Axis -42 degrees    T Axis 191 degrees    Diagnosis Line Sinus tachycardia  Left axis deviation  Left bundle branchblock  Abnormal ECG    Confirmed by LOYD MARADIAGA MD ([id number]) on 5/16/2023 6:49:57 AM (05-16-23 @ 05:35)      MEDICATIONS  aMIOdarone Infusion 0.501 IV Continuous <Continuous>  aMIOdarone Infusion 0.5 IV Continuous <Continuous>  cefepime   IVPB     cefepime   IVPB 2000 IV Intermittent every 12 hours  chlorhexidine 0.12% Liquid 15 Oral Mucosa two times a day  chlorhexidine 2% Cloths 1 Topical <User Schedule>  dexMEDEtomidine Infusion 0.3 IV Continuous <Continuous>  enoxaparin Injectable 60 SubCutaneous every 12 hours  fentaNYL   Infusion. 0.5 IV Continuous <Continuous>  ipratropium  for Nebulization. 500 Nebulizer once  ipratropium 17 MICROgram(s) HFA Inhaler 1 Inhalation once  norepinephrine Infusion 0.05 IV Continuous <Continuous>  pantoprazole  Injectable 40 IV Push daily  polyethylene glycol 3350 17 Oral daily  senna 2 Oral at bedtime  sertraline 25 Oral daily  sodium chloride 0.9%. 1000 IV Continuous <Continuous>      WEIGHT  Weight (kg): 60 (05-13-23 @ 07:15)  Creatinine, Serum: 1.0 mg/dL (05-17-23 @ 04:50)      ANTIBIOTICS:  cefepime   IVPB      cefepime   IVPB 2000 milliGRAM(s) IV Intermittent every 12 hours      All available historical records have been reviewed       AROLDO KO  77y, Female  Allergy: No Known Allergies      LOS  5d    CHIEF COMPLAINT: atrial fib (16 May 2023 14:07)      INTERVAL EVENTS/HPI  - No acute events overnight  - on amiodarone drip   - T(F): , Max: 98.1 (05-16-23 @ 12:00)  - WBC Count: 7.84 (05-17-23 @ 04:50)  WBC Count: 14.11 (05-16-23 @ 04:45)     - Creatinine, Serum: 1.0 (05-17-23 @ 04:50)  Creatinine, Serum: 1.1 (05-16-23 @ 04:45)       ROS  General: Denies rigors, nightsweats  HEENT: Denies headache, rhinorrhea, sore throat, eye pain  CV: Denies CP, palpitations  PULM: Denies wheezing, hemoptysis  GI: Denies hematemesis, hematochezia, melena  : Denies discharge, hematuria  MSK: Denies arthralgias, myalgias  SKIN: Denies rash, lesions  NEURO: Denies paresthesias, weakness  PSYCH: Denies depression, anxiety    VITALS:  T(F): 96.7, Max: 98.1 (05-16-23 @ 12:00)  HR: 98  BP: --  RR: 18Vital Signs Last 24 Hrs  T(C): 35.9 (17 May 2023 08:00), Max: 36.7 (16 May 2023 12:00)  T(F): 96.7 (17 May 2023 08:00), Max: 98.1 (16 May 2023 12:00)  HR: 98 (17 May 2023 08:00) (82 - 139)  BP: --  BP(mean): --  RR: 18 (17 May 2023 08:00) (18 - 63)  SpO2: 99% (17 May 2023 08:00) (96% - 99%)    Parameters below as of 17 May 2023 08:00  Patient On (Oxygen Delivery Method): nasal cannula  O2 Flow (L/min): 3      PHYSICAL EXAM:  Gen: NAD, resting in bed  HEENT: Normocephalic, atraumatic  Neck: supple, no lymphadenopathy  CV: Regular rate & regular rhythm  Lungs: decreased BS at bases, no fremitus  Abdomen: Soft, BS present  Ext: Warm, well perfused  Neuro: non focal, awake  Skin: no rash, no erythema  Lines: no phlebitis    FH: Non-contributory  Social Hx: Non-contributory    TESTS & MEASUREMENTS:                        12.1   7.84  )-----------( 269      ( 17 May 2023 04:50 )             36.6     05-17    137  |  97<L>  |  24<H>  ----------------------------<  115<H>  3.4<L>   |  29  |  1.0    Ca    9.1      17 May 2023 04:50  Phos  2.6     05-16  Mg     2.1     05-17    TPro  5.9<L>  /  Alb  3.3<L>  /  TBili  0.7  /  DBili  x   /  AST  24  /  ALT  23  /  AlkPhos  94  05-17      LIVER FUNCTIONS - ( 17 May 2023 04:50 )  Alb: 3.3 g/dL / Pro: 5.9 g/dL / ALK PHOS: 94 U/L / ALT: 23 U/L / AST: 24 U/L / GGT: x               Culture - Sputum (collected 05-14-23 @ 14:18)  Source: Trach Asp Tracheal Aspirate  Gram Stain (05-15-23 @ 03:46):    Moderate polymorphonuclear leukocytes per low power field    No Squamous epithelial cells per low power field    No organisms seen per oil power field  Final Report (05-16-23 @ 18:24):    No growth at 48 hours    Culture - Blood (collected 05-14-23 @ 04:49)  Source: .Blood Blood  Preliminary Report (05-15-23 @ 18:02):    No growth to date.    Culture - Blood (collected 05-13-23 @ 02:56)  Source: .Blood Blood  Preliminary Report (05-14-23 @ 10:02):    No growth to date.        Blood Gas Venous - Lactate: 1.30 mmol/L (05-14-23 @ 14:50)  Lactate, Blood: 1.3 mmol/L (05-14-23 @ 04:15)  Lactate, Blood: 1.0 mmol/L (05-13-23 @ 23:44)  Lactate, Blood: 0.9 mmol/L (05-13-23 @ 20:27)  Blood Gas Venous - Lactate: 1.30 mmol/L (05-13-23 @ 13:23)  Lactate, Blood: 1.0 mmol/L (05-13-23 @ 11:00)  Lactate, Blood: 1.9 mmol/L (05-13-23 @ 06:12)  Blood Gas Venous - Lactate: 1.50 mmol/L (05-13-23 @ 06:02)  Blood Gas Venous - Lactate: 4.10 mmol/L (05-13-23 @ 04:06)      INFECTIOUS DISEASES TESTING  Rapid RVP Result: Detected (05-15-23 @ 11:55)  Procalcitonin, Serum: 0.26 (05-15-23 @ 04:15)  Procalcitonin, Serum: 0.23 (05-14-23 @ 11:41)  MRSA PCR Result.: Negative (05-14-23 @ 05:52)  Procalcitonin, Serum: 0.03 (05-13-23 @ 06:12)  COVID-19 PCR: NotDetec (05-12-23 @ 23:21)      INFLAMMATORY MARKERS      RADIOLOGY & ADDITIONAL TESTS:  I have personally reviewed the last available Chest xray  CXR      CT      CARDIOLOGY TESTING  12 Lead ECG:   Ventricular Rate 140 BPM    Atrial Rate 133 BPM    QRS Duration 120 ms    Q-T Interval 356 ms    QTC Calculation(Bazett) 543 ms    R Axis -38 degrees    T Axis 162 degrees    Diagnosis Line Atrial fibrillation with rapid ventricular response with premature ventricular  or aberrantly conducted complexes  Left axis deviation  Left bundle branch block  Abnormal ECG    Confirmed by Edu Sheikh ([id number]) on 5/16/2023 6:30:31 PM (05-16-23 @ 05:38)  12 Lead ECG:   Ventricular Rate 140 BPM    Atrial Rate 129 BPM    QRS Duration 122 ms    Q-T Interval 380 ms    QTC Calculation(Bazett) 580 ms    R Axis -42 degrees    T Axis 191 degrees    Diagnosis Line Sinus tachycardia  Left axis deviation  Left bundle branchblock  Abnormal ECG    Confirmed by LOYD MARADIAGA MD ([id number]) on 5/16/2023 6:49:57 AM (05-16-23 @ 05:35)      MEDICATIONS  aMIOdarone Infusion 0.501 IV Continuous <Continuous>  aMIOdarone Infusion 0.5 IV Continuous <Continuous>  cefepime   IVPB     cefepime   IVPB 2000 IV Intermittent every 12 hours  chlorhexidine 0.12% Liquid 15 Oral Mucosa two times a day  chlorhexidine 2% Cloths 1 Topical <User Schedule>  dexMEDEtomidine Infusion 0.3 IV Continuous <Continuous>  enoxaparin Injectable 60 SubCutaneous every 12 hours  fentaNYL   Infusion. 0.5 IV Continuous <Continuous>  ipratropium  for Nebulization. 500 Nebulizer once  ipratropium 17 MICROgram(s) HFA Inhaler 1 Inhalation once  norepinephrine Infusion 0.05 IV Continuous <Continuous>  pantoprazole  Injectable 40 IV Push daily  polyethylene glycol 3350 17 Oral daily  senna 2 Oral at bedtime  sertraline 25 Oral daily  sodium chloride 0.9%. 1000 IV Continuous <Continuous>      WEIGHT  Weight (kg): 60 (05-13-23 @ 07:15)  Creatinine, Serum: 1.0 mg/dL (05-17-23 @ 04:50)      ANTIBIOTICS:  cefepime   IVPB      cefepime   IVPB 2000 milliGRAM(s) IV Intermittent every 12 hours      All available historical records have been reviewed

## 2023-05-17 NOTE — PROGRESS NOTE ADULT - SUBJECTIVE AND OBJECTIVE BOX
CHIEF COMPLAINT:  Patient is a 77y old  Female who presents with a chief complaint of atrial fib (17 May 2023 09:25)      INTERVAL HISTORY/OVERNIGHT EVENTS:      ======================  MEDICATIONS:  cefepime   IVPB      cefepime   IVPB 2000 milliGRAM(s) IV Intermittent every 12 hours  chlorhexidine 0.12% Liquid 15 milliLiter(s) Oral Mucosa two times a day  chlorhexidine 2% Cloths 1 Application(s) Topical <User Schedule>  enoxaparin Injectable 60 milliGRAM(s) SubCutaneous every 12 hours  ipratropium  for Nebulization. 500 MICROGram(s) Nebulizer once  ipratropium 17 MICROgram(s) HFA Inhaler 1 Puff(s) Inhalation once  pantoprazole  Injectable 40 milliGRAM(s) IV Push daily  polyethylene glycol 3350 17 Gram(s) Oral daily  senna 2 Tablet(s) Oral at bedtime  sertraline 25 milliGRAM(s) Oral daily    DRIPS:  aMIOdarone Infusion 0.5 mG/Min (16.7 mL/Hr) IV Continuous <Continuous>  aMIOdarone Infusion 0.501 mG/Min (16.7 mL/Hr) IV Continuous <Continuous>  dexMEDEtomidine Infusion 0.3 MICROgram(s)/kG/Hr (4.5 mL/Hr) IV Continuous <Continuous>  fentaNYL   Infusion. 0.5 MICROgram(s)/kG/Hr (2.84 mL/Hr) IV Continuous <Continuous>      ======================  PHYSICAL EXAMINATION:  GEN:  nad.   HEENT:  eomi. ncat  PULM:  b/l lung sounds   CARD: s1, s2  ABD: +bs. ntnd  EXT:  no new rashes.    NEURO:  no new focal deficits.   ======================  OBJECTIVE:        VS:  T(F): 96.7 (05-17 @ 08:00), Max: 98.1 (05-16 @ 12:00)  HR: 107 (05-17 @ 09:00) (82 - 139)  BP: --  RR: 18 (05-17 @ 09:00) (18 - 63)  SpO2: 99% (05-17 @ 09:00) (96% - 99%)  CVP(mm Hg): -61 (05-17 @ 04:00) (-61 - 5)      I/O:      05-14 @ 07:01  -  05-15 @ 07:00  --------------------------------------------------------  IN: 1887 mL / OUT: 3690 mL / NET: -1803 mL    05-15 @ 07:01  -  05-16 @ 07:00  --------------------------------------------------------  IN: 1181.8 mL / OUT: 1995 mL / NET: -813.2 mL    05-16 @ 07:01  -  05-17 @ 07:00  --------------------------------------------------------  IN: 1805 mL / OUT: 815 mL / NET: 990 mL    05-17 @ 07:01  -  05-17 @ 10:22  --------------------------------------------------------  IN: 203.1 mL / OUT: 105 mL / NET: 98.1 mL        Weight trend:  Weight (kg): 60 (05-13)    ======================    LABS:  EDWAR - ( 16 May 2023 14:34 )  pH, Arterial: 7.54  pH, Blood: x     /  pCO2: 37    /  pO2: 147   / HCO3: 32    / Base Excess: 8.6   /  SaO2: 99.2                                    12.1   7.84  )-----------( 269      ( 17 May 2023 04:50 )             36.6     05-17    137  |  97<L>  |  24<H>  ----------------------------<  115<H>  3.4<L>   |  29  |  1.0    Ca    9.1      17 May 2023 04:50  Phos  2.6     05-16  Mg     2.1     05-17    TPro  5.9<L>  /  Alb  3.3<L>  /  TBili  0.7  /  DBili  x   /  AST  24  /  ALT  23  /  AlkPhos  94  05-17    LIVER FUNCTIONS - ( 17 May 2023 04:50 )  Alb: 3.3 g/dL / Pro: 5.9 g/dL / ALK PHOS: 94 U/L / ALT: 23 U/L / AST: 24 U/L / GGT: x               CARDIAC MARKERS ( 15 May 2023 15:46 )  x     / <0.01 ng/mL / x     / x     / x                Cultures:    Culture - Sputum (collected 05-14)  Source: Trach Asp Tracheal Aspirate  Gram Stain:    Moderate polymorphonuclear leukocytes per low power field    No Squamous epithelial cells per low power field    No organisms seen per oil power field  Final Report:    No growth at 48 hours    Culture - Blood (collected 05-14)  Source: .Blood Blood  Preliminary Report:    No growth to date.    Culture - Blood (collected 05-13)  Source: .Blood Blood  Preliminary Report:    No growth to date.           CHIEF COMPLAINT:  Patient is a 77y old  Female who presents with a chief complaint of atrial fib (17 May 2023 09:25)      INTERVAL HISTORY/OVERNIGHT EVENTS:  No major overnight events. Off Pressors.    ======================  MEDICATIONS:  cefepime   IVPB      cefepime   IVPB 2000 milliGRAM(s) IV Intermittent every 12 hours  chlorhexidine 0.12% Liquid 15 milliLiter(s) Oral Mucosa two times a day  chlorhexidine 2% Cloths 1 Application(s) Topical <User Schedule>  enoxaparin Injectable 60 milliGRAM(s) SubCutaneous every 12 hours  ipratropium  for Nebulization. 500 MICROGram(s) Nebulizer once  ipratropium 17 MICROgram(s) HFA Inhaler 1 Puff(s) Inhalation once  pantoprazole  Injectable 40 milliGRAM(s) IV Push daily  polyethylene glycol 3350 17 Gram(s) Oral daily  senna 2 Tablet(s) Oral at bedtime  sertraline 25 milliGRAM(s) Oral daily    DRIPS:  aMIOdarone Infusion 0.5 mG/Min (16.7 mL/Hr) IV Continuous <Continuous>  aMIOdarone Infusion 0.501 mG/Min (16.7 mL/Hr) IV Continuous <Continuous>  dexMEDEtomidine Infusion 0.3 MICROgram(s)/kG/Hr (4.5 mL/Hr) IV Continuous <Continuous>  fentaNYL   Infusion. 0.5 MICROgram(s)/kG/Hr (2.84 mL/Hr) IV Continuous <Continuous>      ======================  PHYSICAL EXAMINATION:  GEN:  nad.   HEENT:  eomi. ncat  PULM:  b/l lung sounds   CARD: Irregular, s1, s2  ABD: +bs. ntnd  EXT:  no new rashes.    NEURO:  no new focal deficits.   ======================  OBJECTIVE:        VS:  T(F): 96.7 (05-17 @ 08:00), Max: 98.1 (05-16 @ 12:00)  HR: 107 (05-17 @ 09:00) (82 - 139)  BP: --  RR: 18 (05-17 @ 09:00) (18 - 63)  SpO2: 99% (05-17 @ 09:00) (96% - 99%)  CVP(mm Hg): -61 (05-17 @ 04:00) (-61 - 5)      I/O:      05-14 @ 07:01  -  05-15 @ 07:00  --------------------------------------------------------  IN: 1887 mL / OUT: 3690 mL / NET: -1803 mL    05-15 @ 07:01  -  05-16 @ 07:00  --------------------------------------------------------  IN: 1181.8 mL / OUT: 1995 mL / NET: -813.2 mL    05-16 @ 07:01  -  05-17 @ 07:00  --------------------------------------------------------  IN: 1805 mL / OUT: 815 mL / NET: 990 mL    05-17 @ 07:01  -  05-17 @ 10:22  --------------------------------------------------------  IN: 203.1 mL / OUT: 105 mL / NET: 98.1 mL        Weight trend:  Weight (kg): 60 (05-13)    ======================    LABS:  EDWAR Gomez ( 16 May 2023 14:34 )  pH, Arterial: 7.54  pH, Blood: x     /  pCO2: 37    /  pO2: 147   / HCO3: 32    / Base Excess: 8.6   /  SaO2: 99.2                                    12.1   7.84  )-----------( 269      ( 17 May 2023 04:50 )             36.6     05-17    137  |  97<L>  |  24<H>  ----------------------------<  115<H>  3.4<L>   |  29  |  1.0    Ca    9.1      17 May 2023 04:50  Phos  2.6     05-16  Mg     2.1     05-17    TPro  5.9<L>  /  Alb  3.3<L>  /  TBili  0.7  /  DBili  x   /  AST  24  /  ALT  23  /  AlkPhos  94  05-17    LIVER FUNCTIONS - ( 17 May 2023 04:50 )  Alb: 3.3 g/dL / Pro: 5.9 g/dL / ALK PHOS: 94 U/L / ALT: 23 U/L / AST: 24 U/L / GGT: x               CARDIAC MARKERS ( 15 May 2023 15:46 )  x     / <0.01 ng/mL / x     / x     / x                Cultures:    Culture - Sputum (collected 05-14)  Source: Trach Asp Tracheal Aspirate  Gram Stain:    Moderate polymorphonuclear leukocytes per low power field    No Squamous epithelial cells per low power field    No organisms seen per oil power field  Final Report:    No growth at 48 hours    Culture - Blood (collected 05-14)  Source: .Blood Blood  Preliminary Report:    No growth to date.    Culture - Blood (collected 05-13)  Source: .Blood Blood  Preliminary Report:    No growth to date.

## 2023-05-17 NOTE — PROGRESS NOTE ADULT - ASSESSMENT
77y F pmh NSCLC stage 3 s/p CRT/surgery 2018, emphysema, severe MR s/p MVR, pAFib s/p coumadin s/p MVR and KOBY closure, past smoker presenting with dyspnea on exertion and cough x 2 weeks admitted to CCU for cardiogenic vs septic shock.     #Mixed shock Cardiogenic + Septic shock - now resolved   #Suspected aspiration pna   #Entero/Rhino virus +  #pAfib RVR  #Severe MR s/p MVR and KOBY closure   #Cardiomyopathy with diffuse hypokinesis of anterior wall, EF 25-30%  #Transaminitis   #Anxiety   #NSCLC s/p CRT/Sx 2018    RECOMMENDATIONS    CNS:   -no depressants.     HEENT:   -Oral care    PULMONARY:    -HOB @ 45 degrees. C/w nebs, inhalers    CARDIOVASCULAR:   -switch Amio infusion to PO  -start metoprolol 25mg q6 for rate control  -Lovenox 65mg BID  -consider ACEI/ARB if BP/Renal function remains stable    GI:   -GI prophylaxis.    -oral feeds  -nutrition eval, may need NG tube    RENAL:    -Follow up lytes.  Correct as needed.     INFECTIOUS DISEASE:   -Follow up cultures: blood and urine.   -CHG 4% daily bath    HEMATOLOGICAL:    -DVT prophylaxis. Lovenox    ENDOCRINE:    -Follow up FS.  -insulin sliding scale if needed    MUSCULOSKELETAL:   -increase ambulation as tolerated    Downgrade to Kontron     77y F pmh NSCLC stage 3 s/p CRT/surgery 2018, emphysema, severe MR s/p MVR, pAFib s/p coumadin s/p MVR and KOBY closure, past smoker presenting with dyspnea on exertion and cough x 2 weeks admitted to CCU for cardiogenic vs septic shock.     #Mixed shock Cardiogenic + Septic shock - now resolved   #Suspected aspiration pna   #Entero/Rhino virus +  #pAfib RVR  #Severe MR s/p MVR and KOBY closure   #Cardiomyopathy with diffuse hypokinesis of anterior wall, EF 25-30%  #Transaminitis   #Anxiety   #NSCLC s/p CRT/Sx 2018    RECOMMENDATIONS    CNS:   -no depressants.     HEENT:   -Oral care    PULMONARY:    -HOB @ 45 degrees. C/w nebs, inhalers    CARDIOVASCULAR:   -switch Amio infusion to PO  -start metoprolol 25mg q6 for rate control  -Lovenox 65mg BID  -consider ACEI/ARB if BP/Renal function remains stable    GI:   -GI prophylaxis.    -oral feeds  -nutrition eval, may need NG tube    RENAL:    -Follow up lytes.  Correct as needed.     INFECTIOUS DISEASE:   -Finish Cefepime course  -CHG 4% daily bath    HEMATOLOGICAL:    -DVT prophylaxis. Lovenox    ENDOCRINE:    -Follow up FS.  -insulin sliding scale if needed    MUSCULOSKELETAL:   -increase ambulation as tolerated    Downgrade to Venmo

## 2023-05-17 NOTE — PROGRESS NOTE ADULT - ASSESSMENT
77y F pmh NSCLC stage 3 s/p CRT/surgery 2018, emphysema, severe MR s/p MVR, pAFib s/p coumadin s/p MVR and KOBY closure, past smoker presenting with dyspnea on exertion and cough x 2 weeks admitted to CCU for cardiogenic vs septic shock.     #Mixed shock Cardiogenic + Septic shock - now resolved   #Suspected aspiration pna   #Entero/Rhino virus +  #pAfib RVR  #Severe MR s/p MVR and KOBY closure   #Cardiomyopathy with diffuse hypokinesis of anterior wall, EF 25-30%  #Transaminitis   #Anxiety   #NSCLC s/p CRT/Sx 2018    RECOMMENDATIONS    CNS:   -no depressants.     HEENT:   -Oral care    PULMONARY:    -HOB @ 45 degrees. C/w nebs, inhalers    CARDIOVASCULAR:   -switch Amio infusion to PO  -start metoprolol 25mg q6 for rate control  -Lovenox 65mg BID  -consider ACEI/ARB if BP/Renal function remains stable    GI:   -GI prophylaxis.    -oral feeds  -nutrition eval, may need NG tube    RENAL:    -Follow up lytes.  Correct as needed.     INFECTIOUS DISEASE:   -Finish Cefepime course  -CHG 4% daily bath    HEMATOLOGICAL:    -DVT prophylaxis. Lovenox    ENDOCRINE:    -Follow up FS.  -insulin sliding scale if needed    MUSCULOSKELETAL:   -increase ambulation as tolerated    Downgrade to AquaHydrate

## 2023-05-17 NOTE — PROGRESS NOTE ADULT - SUBJECTIVE AND OBJECTIVE BOX
Patient is a 77y old  Female who presents with a chief complaint of atrial fib (17 May 2023 10:22)    HPI:  76yo F hx of NSCLC stage 3 s/p CRT and surgery 2018, emphysema, severe mitral valve regurgitation s/p MVR, paroxysmal atrial fibrillation previously on coumadin but stopped after MVR and KOBY closure, former smoker presenting with progressive shortness of breath on exertion, and intermittent dry cough lasting for past 2 weeks. Denies chest pain, palpitations, headaches, wheezing, abdominal pain, fevers, sick contacts, nausea/vomiting. Was prescribed meds from PMD without symptomatic relief. Presented to the ED from home.    At the ED, VS- T: 36.7C, BP: 90/70, HR: 160s, SpO2: 99% on RA, RR: 16. Labs significant for BNP- 6227, and elevated AST/ALT (54/60). Troponin<0.01 x1. EKG showed atrial fibrillation with RVR. CT Chest NC showed unchanged RLL GGO.     Admitted to telemetry for afib with RVR. Given 60mg x1 therapeutic lovenox. 1L LR bolus. For afib with  RVR, was given 20mg IVP diltiazem and 60mg PO. HR improved to 94.  (12 May 2023 23:20)       INTERVAL HPI/OVERNIGHT EVENTS:   No overnight events   Afebrile, hemodynamically stable     Subjective:    ICU Vital Signs Last 24 Hrs  T(C): 35.9 (17 May 2023 08:00), Max: 36.7 (16 May 2023 16:00)  T(F): 96.7 (17 May 2023 08:00), Max: 98.1 (16 May 2023 16:00)  HR: 102 (17 May 2023 13:00) (82 - 132)  BP: 109/70 (17 May 2023 13:00) (109/70 - 109/70)  BP(mean): 83 (17 May 2023 13:00) (83 - 83)  ABP: 145/77 (17 May 2023 12:00) (93/43 - 145/77)  ABP(mean): 101 (17 May 2023 12:00) (58 - 101)  RR: 19 (17 May 2023 13:00) (18 - 63)  SpO2: 99% (17 May 2023 13:00) (96% - 99%)    O2 Parameters below as of 17 May 2023 13:00  Patient On (Oxygen Delivery Method): nasal cannula  O2 Flow (L/min): 3        I&O's Summary    16 May 2023 07:  -  17 May 2023 07:00  --------------------------------------------------------  IN: 1805 mL / OUT: 815 mL / NET: 990 mL    17 May 2023 07:01  -  17 May 2023 13:44  --------------------------------------------------------  IN: 203.1 mL / OUT: 105 mL / NET: 98.1 mL          Daily     Daily Weight in k.7 (17 May 2023 06:00)    Adult Advanced Hemodynamics Last 24 Hrs  CVP(mm Hg): -61 (17 May 2023 04:00) (-61 - 3)  CVP(cm H2O): --  CO: --  CI: --  PA: --  PA(mean): --  PCWP: --  SVR: --  SVRI: --  PVR: --  PVRI: --    EKG/Telemetry Events:    MEDICATIONS  (STANDING):  aMIOdarone    Tablet 400 milliGRAM(s) Oral every 8 hours  cefepime   IVPB      cefepime   IVPB 2000 milliGRAM(s) IV Intermittent every 12 hours  chlorhexidine 0.12% Liquid 15 milliLiter(s) Oral Mucosa two times a day  chlorhexidine 2% Cloths 1 Application(s) Topical <User Schedule>  enoxaparin Injectable 60 milliGRAM(s) SubCutaneous every 12 hours  ipratropium  for Nebulization. 500 MICROGram(s) Nebulizer once  ipratropium 17 MICROgram(s) HFA Inhaler 1 Puff(s) Inhalation once  metoprolol tartrate 25 milliGRAM(s) Oral every 6 hours  pantoprazole  Injectable 40 milliGRAM(s) IV Push daily  polyethylene glycol 3350 17 Gram(s) Oral daily  senna 2 Tablet(s) Oral at bedtime  sertraline 25 milliGRAM(s) Oral daily    MEDICATIONS  (PRN):      PHYSICAL EXAM:  GENERAL:   HEAD:  Atraumatic, Normocephalic  EYES: EOMI, PERRLA, conjunctiva and sclera clear  NECK: Supple, No JVD, Normal thyroid, no enlarged nodes  NERVOUS SYSTEM:  Alert & Awake.   CHEST/LUNG: B/L good air entry; No rales, rhonchi, or wheezing  HEART: S1S2 normal, no S3, Regular rate and rhythm; No murmurs  ABDOMEN: Soft, Nontender, Nondistended; Bowel sounds present  EXTREMITIES:  2+ Peripheral Pulses, No clubbing, cyanosis, or edema  LYMPH: No lymphadenopathy noted  SKIN: No rashes or lesions    LABS:                        12.1   7.84  )-----------( 269      ( 17 May 2023 04:50 )             36.6     05-17    137  |  97<L>  |  24<H>  ----------------------------<  115<H>  3.4<L>   |  29  |  1.0    Ca    9.1      17 May 2023 04:50  Phos  2.6     05-16  Mg     2.1     -    TPro  5.9<L>  /  Alb  3.3<L>  /  TBili  0.7  /  DBili  x   /  AST  24  /  ALT  23  /  AlkPhos  94  05-17    LIVER FUNCTIONS - ( 17 May 2023 04:50 )  Alb: 3.3 g/dL / Pro: 5.9 g/dL / ALK PHOS: 94 U/L / ALT: 23 U/L / AST: 24 U/L / GGT: x             CAPILLARY BLOOD GLUCOSE        ABG - ( 16 May 2023 14:34 )  pH, Arterial: 7.54  pH, Blood: x     /  pCO2: 37    /  pO2: 147   / HCO3: 32    / Base Excess: 8.6   /  SaO2: 99.2                CARDIAC MARKERS ( 15 May 2023 15:46 )  x     / <0.01 ng/mL / x     / x     / x                RADIOLOGY & ADDITIONAL TESTS:  CXR: ACC: [id number] EXAM:  XR CHEST PORTABLE ROUTINE 1V   ORDERED BY: BRODY MORTON     PROCEDURE DATE:  2023          INTERPRETATION:  Clinical History / Reason for exam: Chest pain.    Comparison : Chest radiograph dated May 16, 2023.    Technique/Positioning: Portable frontal.    Findings:    Support devices: Left IJ central venous catheter in stable position.    Cardiac/mediastinum/hilum: Stable.    Lung parenchyma/Pleura: Unchanged bibasilar opacities/pleural effusions,   left greaterthan right. No pneumothorax.    Skeleton/soft tissues: Stable.    Impression:    1. Unchanged bibasilar opacities/pleural effusions, left greater than   right.          Care Discussed with Consultants/Other Providers [ x] YES  [ ] NO

## 2023-05-17 NOTE — PROGRESS NOTE ADULT - TIME BILLING
I have personally seen and examined this patient.    I have reviewed all pertinent clinical information and reviewed all relevant imaging and diagnostic studies personally.   I counseled the patient about diagnostic testing and treatment plan. All questions were answered.   I discussed recommendations with the primary team.
A-flutter with RVR, hypotension, systolic CHF.
A-fib, CMP.

## 2023-05-17 NOTE — PROGRESS NOTE ADULT - ASSESSMENT
ASSESSMENT  78yo F hx of NSCLC stage 3 s/p CRT and surgery 2018, emphysema, severe mitral valve regurgitation s/p MVR, paroxysmal atrial fibrillation previously on coumadin but stopped after MVR and KOBY closure, former smoker presenting with progressive shortness of breath on exertion, and intermittent dry cough lasting for past 2 weeks.    IMPRESSION  #Acute Hypoxemic failure s/p intubation, extubation  - CT Angio Chest PE Protocol w/ IV Cont (05.13.23 @ 05:43): IMPRESSION: 1. Small bilateral pleural effusions. Lung groundglass opacities, likely  representing pulmonary edema. . Cardiomegaly. Correlation for CHF  recommended. 2. No evidence of acutepulmonary embolism.    #Fevers - Enterovirus/Rhinovirus positive  - Blood Cx 5/13 NG  - Trach Cx 5/13 NG  - MRSA nares negative  - WBC Count: 16.62 K/uL (05.13.23 @ 06:12)  - Procalcitonin, Serum: 0.26 (05.15.23 @ 04:15)    #Obesity BMI (kg/m2): 24.2  #DM   #Abx allergy: No Known Allergies      RECOMMENDATIONS  This is a preliminary incomplete pended note, all final recommendations to follow after interview and examination of the patient.    Please call or message on [employer] Teams if with any questions.  Spectra [phone]       ASSESSMENT  76yo F hx of NSCLC stage 3 s/p CRT and surgery 2018, emphysema, severe mitral valve regurgitation s/p MVR, paroxysmal atrial fibrillation previously on coumadin but stopped after MVR and KOBY closure, former smoker presenting with progressive shortness of breath on exertion, and intermittent dry cough lasting for past 2 weeks.    IMPRESSION  #Acute Hypoxemic failure s/p intubation, extubation  - CT Angio Chest PE Protocol w/ IV Cont (05.13.23 @ 05:43): IMPRESSION: 1. Small bilateral pleural effusions. Lung groundglass opacities, likely  representing pulmonary edema. . Cardiomegaly. Correlation for CHF  recommended. 2. No evidence of acutepulmonary embolism.    #Fevers - Enterovirus/Rhinovirus positive  - Blood Cx 5/13 NG  - Trach Cx 5/13 NG  - MRSA nares negative  - WBC Count: 16.62 K/uL (05.13.23 @ 06:12)  - Procalcitonin, Serum: 0.26 (05.15.23 @ 04:15)    #Atrial Fibrillation with RVR    #Obesity BMI (kg/m2): 24.2  #DM   #Abx allergy: No Known Allergies      RECOMMENDATIONS  - can stop antibiotics after today's dose as WBC normalized and trach Cx NG, with positive enterovirus/rhinovirus    Please call or message on [employer] Teams if with any questions.  Spectra [phone]

## 2023-05-17 NOTE — CONSULT NOTE ADULT - ASSESSMENT
- NSCLC stage 3 s/p CRT and surgery  - severe MR  - cardiogenic/ septic shock resolved  -entero/ rhino virus +  cardiomyopathy  - dyspnea r/t lung ca  - severe protein calorie malnutrition, likely chronic/ progressive    d/w CCU team and residents  pt given po diet today but intake poor  need GOC conversation and treatment decisions (including status of the cancer and any further tx plans)  - would cont to offer po diet and encourage intake of nutrient dense foods  - need to consider NG supplementation, if this is what pt wants - understanding that this is a temporizing measure  - if meal intake less than 50%, give 240 ml Jevity 1.2 over 30 min via small bore NG, after each attempted po meal  - treat phos, keep > 3.5  - once GOC decided, mode of nutrition, long term, should be addressed

## 2023-05-17 NOTE — PHARMACOTHERAPY INTERVENTION NOTE - COMMENTS
Recommended discontinuing cefepime, ordered empirically for suspected pneumonia, in the setting of negative tracheal aspirate cultures and normalized WBC in accordance with ID consult recommendations.    Bijan Bergman, PharmD  Clinical Pharmacy Specialist, Infectious Diseases  Tele-Antimicrobial Stewardship Program (Tele-[facility])  Tele-ASP Phone: [phone]

## 2023-05-17 NOTE — CHART NOTE - NSCHARTNOTEFT_GEN_A_CORE
Transfer Note: Medical Floor to Intensive Care     Transfer from: Medical Floor    Transfer to:  (  ) CCU    (  ) MICU   (  ) Telemetry     (  ) RCU                               (  ) Palliative    (  ) Stroke Unit    (  ) __________________        Follow up:  - f/u nutrition recs  - f/u PT recs  - started metoprolol tartrate 25mg q6h    HPI / HOSPITAL COURSE:  76yo F hx of NSCLC stage 3 s/p CRT and surgery 2018, emphysema, severe mitral valve regurgitation s/p MVR, paroxysmal atrial fibrillation previously on coumadin but stopped after MVR and KOBY closure, former smoker presenting with progressive shortness of breath on exertion, and intermittent dry cough lasting for past 2 weeks. Denies chest pain, palpitations, headaches, wheezing, abdominal pain, fevers, sick contacts, nausea/vomiting. Was prescribed meds from PMD without symptomatic relief. Presented to the ED from home.    At the ED, VS- T: 36.7C, BP: 90/70, HR: 160s, SpO2: 99% on RA, RR: 16. Labs significant for BNP- 6227, and elevated AST/ALT (54/60). Troponin<0.01 x1. EKG showed atrial fibrillation with RVR. CT Chest NC showed unchanged RLL GGO.     Admitted to telemetry for afib with RVR. Given 60mg x1 therapeutic lovenox. 1L LR bolus. For afib with  RVR, was given 20mg IVP diltiazem and 60mg PO. HR improved to 94 bpm.          Vital Signs Last 24 Hrs  T(C): 35.9 (17 May 2023 08:00), Max: 36.7 (16 May 2023 16:00)  T(F): 96.7 (17 May 2023 08:00), Max: 98.1 (16 May 2023 16:00)  HR: 122 (17 May 2023 12:00) (82 - 139)  BP: --  BP(mean): --  RR: 27 (17 May 2023 12:00) (18 - 63)  SpO2: 98% (17 May 2023 12:00) (96% - 99%)    Parameters below as of 17 May 2023 12:00  Patient On (Oxygen Delivery Method): nasal cannula  O2 Flow (L/min): 3      I&O's Summary    16 May 2023 07:01  -  17 May 2023 07:00  --------------------------------------------------------  IN: 1805 mL / OUT: 815 mL / NET: 990 mL    17 May 2023 07:01  -  17 May 2023 12:17  --------------------------------------------------------  IN: 203.1 mL / OUT: 105 mL / NET: 98.1 mL        Physical Exam:   General: NAD, awake and alert  Cardiac: RRR, no murmur, no rub, no gallop  Respiratory: Good air exchange bilaterally, no wheezes, no crackles  GI: Abdomen nondistended, nontender, bowel sounds present  Neuro: AAOx3, no tremors, no fasciculations     LABS:   CARDIAC MARKERS ( 15 May 2023 15:46 )  x     / <0.01 ng/mL / x     / x     / x                                  12.1   7.84  )-----------( 269      ( 17 May 2023 04:50 )             36.6       05-17    137  |  97<L>  |  24<H>  ----------------------------<  115<H>  3.4<L>   |  29  |  1.0    Ca    9.1      17 May 2023 04:50  Phos  2.6     05-16  Mg     2.1     05-17    TPro  5.9<L>  /  Alb  3.3<L>  /  TBili  0.7  /  DBili  x   /  AST  24  /  ALT  23  /  AlkPhos  94  05-17          ABG - ( 16 May 2023 14:34 )  pH, Arterial: 7.54  pH, Blood: x     /  pCO2: 37    /  pO2: 147   / HCO3: 32    / Base Excess: 8.6   /  SaO2: 99.2          ASSESSMENT & PLAN:   77y F pmh NSCLC stage 3 s/p CRT/surgery 2018 (followed by Dr. Minda Lopez MD since 12/8/2017, was originally given chemotherapy with Carboplatin/Alimta with Neulasta), emphysema, severe MR s/p MVR, pAFib s/p coumadin s/p MVR and KOBY closure, past smoker presenting with dyspnea on exertion and cough x 2 weeks admitted to CCU for cardiogenic vs septic shock.     #Cardiogenic/Septic shock   #Suspected aspiration pna   #pAfib RVR  #Severe MR s/p MVR and KOBY closure   - s/p cardizem and lopressor in ER for Afib RVR, rapid response and upgrade to CCU 5/13 for AMS and hypotension   - TTE 5/13 - diffuse hypokinesis of anterior wall, EF 25-30%  - intubated for airway protection, wean as tolerated (SAT/SBT)  - CTA chest - no PE, b/l pleural effusions and ggo consistent with pulmonary edema  - CTH - no acute intracranial pathology    - TSH 7.26   - lactate and troponin wnl   - c/w levophed ggt, off dobutamine, wean as tolerated   - c/w amio ggt  - c/w cefepime [id number] IV, vancomycin d/c - MRSA -ve  - ID following, likely natali,;  Procal 0.03, deep tracheal aspirate, Bcx, w/NGTD  - UA negative  - IVC 2.0, collapsible  - consider cardioversion      #Transaminitis   - pending RUQ US  - trend LFTs qD    #Anxiety   - c/w sertraline 25mg     # Misc  - DVT Prophylaxis: prophylactic dose  - GI Prophylaxis: pantoprazole  Injectable 40 milliGRAM(s) IV Push daily, on Bowel Regimen  - Diet: NPO   - Activity: Bedrest  - Code Status: Full Transfer Note: CCU to Medical Floor     Transfer from: Medical Floor    Transfer to:  (  ) CCU    (  ) MICU   ( X ) Telemetry     (  ) RCU                               (  ) Palliative    (  ) Stroke Unit            Follow up:  - f/u nutrition recs  - f/u PT recs  - started metoprolol tartrate 25mg q6h    HPI / HOSPITAL COURSE:  78yo F hx of NSCLC stage 3 s/p CRT and surgery 2018, emphysema, severe mitral valve regurgitation s/p MVR, paroxysmal atrial fibrillation previously on coumadin but stopped after MVR and KOBY closure, former smoker presenting with progressive shortness of breath on exertion, and intermittent dry cough lasting for past 2 weeks. Denies chest pain, palpitations, headaches, wheezing, abdominal pain, fevers, sick contacts, nausea/vomiting. Was prescribed meds from PMD without symptomatic relief. Presented to the ED from home.    At the ED, VS- T: 36.7C, BP: 90/70, HR: 160s, SpO2: 99% on RA, RR: 16. Labs significant for BNP- 6227, and elevated AST/ALT (54/60). Troponin<0.01 x1. EKG showed atrial fibrillation with RVR. CT Chest NC showed unchanged RLL GGO.     Admitted to telemetry for afib with RVR. Given 60mg x1 therapeutic lovenox. 1L LR bolus. For afib with  RVR, was given 20mg IVP diltiazem and 60mg PO. HR improved to 94 bpm.          Vital Signs Last 24 Hrs  T(C): 35.9 (17 May 2023 08:00), Max: 36.7 (16 May 2023 16:00)  T(F): 96.7 (17 May 2023 08:00), Max: 98.1 (16 May 2023 16:00)  HR: 122 (17 May 2023 12:00) (82 - 139)  BP: --  BP(mean): --  RR: 27 (17 May 2023 12:00) (18 - 63)  SpO2: 98% (17 May 2023 12:00) (96% - 99%)    Parameters below as of 17 May 2023 12:00  Patient On (Oxygen Delivery Method): nasal cannula  O2 Flow (L/min): 3      I&O's Summary    16 May 2023 07:01  -  17 May 2023 07:00  --------------------------------------------------------  IN: 1805 mL / OUT: 815 mL / NET: 990 mL    17 May 2023 07:01  -  17 May 2023 12:17  --------------------------------------------------------  IN: 203.1 mL / OUT: 105 mL / NET: 98.1 mL        Physical Exam:   General: NAD, awake and alert  Cardiac: RRR, no murmur, no rub, no gallop  Respiratory: Good air exchange bilaterally, no wheezes, no crackles  GI: Abdomen nondistended, nontender, bowel sounds present  Neuro: AAOx3, no tremors, no fasciculations     LABS:   CARDIAC MARKERS ( 15 May 2023 15:46 )  x     / <0.01 ng/mL / x     / x     / x                                  12.1   7.84  )-----------( 269      ( 17 May 2023 04:50 )             36.6       05-17    137  |  97<L>  |  24<H>  ----------------------------<  115<H>  3.4<L>   |  29  |  1.0    Ca    9.1      17 May 2023 04:50  Phos  2.6     05-16  Mg     2.1     05-17    TPro  5.9<L>  /  Alb  3.3<L>  /  TBili  0.7  /  DBili  x   /  AST  24  /  ALT  23  /  AlkPhos  94  05-17          ABG - ( 16 May 2023 14:34 )  pH, Arterial: 7.54  pH, Blood: x     /  pCO2: 37    /  pO2: 147   / HCO3: 32    / Base Excess: 8.6   /  SaO2: 99.2          ASSESSMENT & PLAN:   77y F pmh NSCLC stage 3 s/p CRT/surgery 2018 (followed by Dr. Minda Lopez MD since 12/8/2017, was originally given chemotherapy with Carboplatin/Alimta with Neulasta), emphysema, severe MR s/p MVR, pAFib s/p coumadin s/p MVR and KOBY closure, past smoker presenting with dyspnea on exertion and cough x 2 weeks admitted to CCU for cardiogenic vs septic shock.     #Cardiogenic/Septic shock   #Suspected aspiration pna   #pAfib RVR  #Severe MR s/p MVR and KOBY closure   - s/p cardizem and lopressor in ER for Afib RVR, rapid response and upgrade to CCU 5/13 for AMS and hypotension   - TTE 5/13 - diffuse hypokinesis of anterior wall, EF 25-30%  - intubated for airway protection, wean as tolerated (SAT/SBT)  - CTA chest - no PE, b/l pleural effusions and ggo consistent with pulmonary edema  - CTH - no acute intracranial pathology    - TSH 7.26   - lactate and troponin wnl   - c/w levophed ggt, off dobutamine, wean as tolerated   - c/w amio ggt  - c/w cefepime [id number] IV, vancomycin d/c - MRSA -ve  - ID following, likely natali,;  Procal 0.03, deep tracheal aspirate, Bcx, w/NGTD  - UA negative  - IVC 2.0, collapsible  - consider cardioversion      #Transaminitis   - pending RUQ US  - trend LFTs qD    #Anxiety   - c/w sertraline 25mg     # Misc  - DVT Prophylaxis: prophylactic dose  - GI Prophylaxis: pantoprazole  Injectable 40 milliGRAM(s) IV Push daily, on Bowel Regimen  - Diet: NPO   - Activity: Bedrest  - Code Status: Full

## 2023-05-17 NOTE — CONSULT NOTE ADULT - SUBJECTIVE AND OBJECTIVE BOX
NUTRITION SUPPORT TEAM  -  CONSULT NOTE     78yo F hx of NSCLC stage 3 s/p CRT and surgery 2018, emphysema, severe mitral valve regurgitation s/p MVR, paroxysmal atrial fibrillation previously on coumadin but stopped after MVR and KOBY closure, former smoker presenting with progressive shortness of breath on exertion, and intermittent dry cough lasting for past 2 weeks. Denies chest pain, palpitations, headaches, wheezing, abdominal pain, fevers, sick contacts, nausea/vomiting. Was prescribed meds from PMD without symptomatic relief. Presented to the ED from home.    At the ED, VS- T: 36.7C, BP: 90/70, HR: 160s, SpO2: 99% on RA, RR: 16. Labs significant for BNP- 6227, and elevated AST/ALT (54/60). Troponin<0.01 x1. EKG showed atrial fibrillation with RVR. CT Chest NC showed unchanged RLL GGO.     Admitted to telemetry for afib with RVR. Given 60mg x1 therapeutic lovenox. 1L LR bolus. For afib with  RVR, was given 20mg IVP diltiazem and 60mg PO. HR improved to 94.  (12 May 2023 23:20)  - intubated for airway protection, now extubated  - CTA chest - no PE, b/l pleural effusions, consistent with pulmonary edema  - CTH - no acute intracranial pathology    - c/w levophed ggt, off dobutamine  - off amio drip     d/w residents - pt now on po diet but eating poorly    REVIEW OF SYSTEMS:  Negative except as noted above.     PAST MEDICAL/SURGICAL HISTORY:   HTN (hypertension)  Paroxysmal atrial fibrillation  Non-small cell lung cancer  H/O mitral valve replacement  Severe mitral valve regurgitation    ALLERGIES:  No Known Allergies    VITALS:  T(F): 96.7 (05-17 @ 08:00), Max: 98 (05-17 @ 04:00)  HR: 110 (05-17 @ 14:00) (82 - 122)  BP: 122/79 (05-17 @ 14:00) (109/70 - 122/79)  RR: 26 (05-17 @ 14:00) (18 - 36)  SpO2: 99% (05-17 @ 14:00) (96% - 99%)    HEIGHT/WEIGHT/BMI:   Height (cm): 157.5 (05-15)  Weight (kg): 60 (05-13)  BMI (kg/m2): 24.2 (05-15)    I/Os:   05-16-23 @ 07:01  -  05-17-23 @ 07:00  --------------------------------------------------------  IN:    Amiodarone: 400.8 mL    Dexmedetomidine: 12 mL    Dexmedetomidine: 70.2 mL    IV PiggyBack: 250 mL    Norepinephrine: 22 mL    sodium chloride 0.9%: 1050 mL  Total IN: 1805 mL  OUT:    FentaNYL: 0 mL    Indwelling Catheter - Urethral (mL): 815 mL  Total OUT: 815 mL  Total NET: 990 mL    PHYSICAL EXAM:   GENERAL: comfortable on O2NC while resitn, + mod distress when trying to talk, alert when wakened and verbal/ appropriated  HEENT: Moist mucous membranes, Good dentition, No lesions, sclerae anicteric  ABDOMEN: Soft, Nontender, Nondistended  SKIN: warm and well perfused; No obvious rashes or lesions, markedly cachectic with loss of LBM and sc fat on exam  pt unsure how much she's lost or over what period of time  per pt, decreased po intake due to SOB - no nausea, dysphagia, GI upset  IV ACCESS: left IJ out, + periph iv access  ENTERAL ACCESS: none    STANDING MEDICATIONS:   aMIOdarone    Tablet 400 milliGRAM(s) Oral every 8 hours  cefepime   IVPB      cefepime   IVPB 2000 milliGRAM(s) IV Intermittent every 12 hours  chlorhexidine 0.12% Liquid 15 milliLiter(s) Oral Mucosa two times a day  chlorhexidine 2% Cloths 1 Application(s) Topical <User Schedule>  enoxaparin Injectable 60 milliGRAM(s) SubCutaneous every 12 hours  ipratropium  for Nebulization. 500 MICROGram(s) Nebulizer once  ipratropium 17 MICROgram(s) HFA Inhaler 1 Puff(s) Inhalation once  metoprolol tartrate 25 milliGRAM(s) Oral every 6 hours  pantoprazole  Injectable 40 milliGRAM(s) IV Push daily  polyethylene glycol 3350 17 Gram(s) Oral daily  senna 2 Tablet(s) Oral at bedtime  sertraline 25 milliGRAM(s) Oral daily    LABS:                         12.1   7.84  )-----------( 269      ( 17 May 2023 04:50 )             36.6     137  |  97<L>  |  24<H>  ----------------------------<  115<H>          (05-17-23 @ 04:50)  3.4<L>   |  29  |  1.0    Ca    9.1          (05-17-23 @ 04:50)  Phos  2.6         (05-16-23 @ 12:00)  Mg     2.1         (05-17-23 @ 04:50)    TPro  5.9<L>  /  Alb  3.3<L>  /  TBili  0.7  /  DBili  x   /  AST  24  /  ALT  23  /  AlkPhos  94       05-17-23 @ 04:50      DIET:   Diet, DASH/TLC:   Sodium & Cholesterol Restricted (05-16-23 @ 15:10) [Active]    RADIOLOGY:   < from: Xray Chest 1 View- PORTABLE-Routine (Xray Chest 1 View- PORTABLE-Routine in AM.) (05.17.23 @ 05:30) >  Support devices: Left IJ central venous catheter in stable position.    Cardiac/mediastinum/hilum: Stable.    Lung parenchyma/Pleura: Unchanged bibasilar opacities/pleural effusions,   left greaterthan right. No pneumothorax.    < end of copied text >

## 2023-05-18 LAB
ALBUMIN SERPL ELPH-MCNC: 3.5 G/DL — SIGNIFICANT CHANGE UP (ref 3.5–5.2)
ALP SERPL-CCNC: 92 U/L — SIGNIFICANT CHANGE UP (ref 30–115)
ALT FLD-CCNC: 24 U/L — SIGNIFICANT CHANGE UP (ref 0–41)
ANION GAP SERPL CALC-SCNC: 14 MMOL/L — SIGNIFICANT CHANGE UP (ref 7–14)
AST SERPL-CCNC: 31 U/L — SIGNIFICANT CHANGE UP (ref 0–41)
BILIRUB SERPL-MCNC: 1 MG/DL — SIGNIFICANT CHANGE UP (ref 0.2–1.2)
BUN SERPL-MCNC: 19 MG/DL — SIGNIFICANT CHANGE UP (ref 10–20)
CALCIUM SERPL-MCNC: 9.3 MG/DL — SIGNIFICANT CHANGE UP (ref 8.4–10.5)
CHLORIDE SERPL-SCNC: 100 MMOL/L — SIGNIFICANT CHANGE UP (ref 98–110)
CO2 SERPL-SCNC: 25 MMOL/L — SIGNIFICANT CHANGE UP (ref 17–32)
CREAT SERPL-MCNC: 0.9 MG/DL — SIGNIFICANT CHANGE UP (ref 0.7–1.5)
CULTURE RESULTS: SIGNIFICANT CHANGE UP
EGFR: 65 ML/MIN/1.73M2 — SIGNIFICANT CHANGE UP
GLUCOSE SERPL-MCNC: 97 MG/DL — SIGNIFICANT CHANGE UP (ref 70–99)
HCT VFR BLD CALC: 41.8 % — SIGNIFICANT CHANGE UP (ref 37–47)
HGB BLD-MCNC: 13.9 G/DL — SIGNIFICANT CHANGE UP (ref 12–16)
MAGNESIUM SERPL-MCNC: 1.7 MG/DL — LOW (ref 1.8–2.4)
MCHC RBC-ENTMCNC: 30.5 PG — SIGNIFICANT CHANGE UP (ref 27–31)
MCHC RBC-ENTMCNC: 33.3 G/DL — SIGNIFICANT CHANGE UP (ref 32–37)
MCV RBC AUTO: 91.7 FL — SIGNIFICANT CHANGE UP (ref 81–99)
NRBC # BLD: 0 /100 WBCS — SIGNIFICANT CHANGE UP (ref 0–0)
PLATELET # BLD AUTO: 343 K/UL — SIGNIFICANT CHANGE UP (ref 130–400)
PMV BLD: 9.3 FL — SIGNIFICANT CHANGE UP (ref 7.4–10.4)
POTASSIUM SERPL-MCNC: 3.3 MMOL/L — LOW (ref 3.5–5)
POTASSIUM SERPL-SCNC: 3.3 MMOL/L — LOW (ref 3.5–5)
PROT SERPL-MCNC: 6.2 G/DL — SIGNIFICANT CHANGE UP (ref 6–8)
RBC # BLD: 4.56 M/UL — SIGNIFICANT CHANGE UP (ref 4.2–5.4)
RBC # FLD: 13.4 % — SIGNIFICANT CHANGE UP (ref 11.5–14.5)
SODIUM SERPL-SCNC: 139 MMOL/L — SIGNIFICANT CHANGE UP (ref 135–146)
SPECIMEN SOURCE: SIGNIFICANT CHANGE UP
T4 AB SER-ACNC: 7 UG/DL — SIGNIFICANT CHANGE UP (ref 4.6–12)
WBC # BLD: 9.76 K/UL — SIGNIFICANT CHANGE UP (ref 4.8–10.8)
WBC # FLD AUTO: 9.76 K/UL — SIGNIFICANT CHANGE UP (ref 4.8–10.8)

## 2023-05-18 PROCEDURE — 99233 SBSQ HOSP IP/OBS HIGH 50: CPT

## 2023-05-18 RX ORDER — PANTOPRAZOLE SODIUM 20 MG/1
40 TABLET, DELAYED RELEASE ORAL
Refills: 0 | Status: DISCONTINUED | OUTPATIENT
Start: 2023-05-18 | End: 2023-05-23

## 2023-05-18 RX ORDER — POTASSIUM CHLORIDE 20 MEQ
40 PACKET (EA) ORAL ONCE
Refills: 0 | Status: COMPLETED | OUTPATIENT
Start: 2023-05-18 | End: 2023-05-18

## 2023-05-18 RX ORDER — MAGNESIUM SULFATE 500 MG/ML
2 VIAL (ML) INJECTION ONCE
Refills: 0 | Status: COMPLETED | OUTPATIENT
Start: 2023-05-18 | End: 2023-05-18

## 2023-05-18 RX ORDER — LISINOPRIL 2.5 MG/1
5 TABLET ORAL DAILY
Refills: 0 | Status: DISCONTINUED | OUTPATIENT
Start: 2023-05-18 | End: 2023-05-23

## 2023-05-18 RX ADMIN — Medication 25 MILLIGRAM(S): at 06:04

## 2023-05-18 RX ADMIN — CHLORHEXIDINE GLUCONATE 1 APPLICATION(S): 213 SOLUTION TOPICAL at 06:03

## 2023-05-18 RX ADMIN — AMIODARONE HYDROCHLORIDE 400 MILLIGRAM(S): 400 TABLET ORAL at 06:03

## 2023-05-18 RX ADMIN — ENOXAPARIN SODIUM 60 MILLIGRAM(S): 100 INJECTION SUBCUTANEOUS at 18:17

## 2023-05-18 RX ADMIN — AMIODARONE HYDROCHLORIDE 400 MILLIGRAM(S): 400 TABLET ORAL at 13:39

## 2023-05-18 RX ADMIN — Medication 3 MILLIGRAM(S): at 00:50

## 2023-05-18 RX ADMIN — Medication 25 MILLIGRAM(S): at 18:17

## 2023-05-18 RX ADMIN — Medication 3 MILLIGRAM(S): at 22:08

## 2023-05-18 RX ADMIN — SENNA PLUS 2 TABLET(S): 8.6 TABLET ORAL at 22:08

## 2023-05-18 RX ADMIN — Medication 25 GRAM(S): at 08:40

## 2023-05-18 RX ADMIN — AMIODARONE HYDROCHLORIDE 400 MILLIGRAM(S): 400 TABLET ORAL at 22:08

## 2023-05-18 RX ADMIN — Medication 25 MILLIGRAM(S): at 12:06

## 2023-05-18 RX ADMIN — Medication 40 MILLIEQUIVALENT(S): at 08:41

## 2023-05-18 RX ADMIN — ENOXAPARIN SODIUM 60 MILLIGRAM(S): 100 INJECTION SUBCUTANEOUS at 06:04

## 2023-05-18 RX ADMIN — LISINOPRIL 5 MILLIGRAM(S): 2.5 TABLET ORAL at 12:06

## 2023-05-18 RX ADMIN — SERTRALINE 25 MILLIGRAM(S): 25 TABLET, FILM COATED ORAL at 12:06

## 2023-05-18 NOTE — PHYSICAL THERAPY INITIAL EVALUATION ADULT - ORIENTATION, REHAB EVAL
oriented to person, place, time and situation
responds to pain/alert and oriented x 3/responds to verbal commands

## 2023-05-18 NOTE — PHYSICAL THERAPY INITIAL EVALUATION ADULT - GAIT DEVIATIONS NOTED, PT EVAL
After ambulating ~50 feet, PT noticed drops of blood on the floor, and realized that patient was bleeding a little. PT returned the patient back to the room, back to the bed. The bleeding was intermittent. JAMES Schneider was notified./decreased step length/decreased stride length

## 2023-05-18 NOTE — PROGRESS NOTE ADULT - SUBJECTIVE AND OBJECTIVE BOX
AROLDO KO 77y Female  MRN#: [id number]   Hospital Day: 6d    SUBJECTIVE  Patient is a 77y old Female who presents with a chief complaint of atrial fib (17 May 2023 14:40)  Currently admitted to medicine with the primary diagnosis of Atrial fibrillation      INTERVAL HPI AND OVERNIGHT EVENTS:  Patient was examined and seen at bedside. This morning she is resting comfortably in bed. No issues or overnight events.    OBJECTIVE  PAST MEDICAL & SURGICAL HISTORY  HTN (hypertension)    Paroxysmal atrial fibrillation    Non-small cell lung cancer    H/O mitral valve replacement    Severe mitral valve regurgitation      ALLERGIES:  No Known Allergies    MEDICATIONS:  STANDING MEDICATIONS  aMIOdarone    Tablet 400 milliGRAM(s) Oral every 8 hours  chlorhexidine 2% Cloths 1 Application(s) Topical <User Schedule>  enoxaparin Injectable 60 milliGRAM(s) SubCutaneous every 12 hours  ipratropium  for Nebulization. 500 MICROGram(s) Nebulizer once  ipratropium 17 MICROgram(s) HFA Inhaler 1 Puff(s) Inhalation once  lisinopril 5 milliGRAM(s) Oral daily  melatonin 3 milliGRAM(s) Oral at bedtime  metoprolol tartrate 25 milliGRAM(s) Oral every 6 hours  pantoprazole    Tablet 40 milliGRAM(s) Oral before breakfast  polyethylene glycol 3350 17 Gram(s) Oral daily  senna 2 Tablet(s) Oral at bedtime  sertraline 25 milliGRAM(s) Oral daily    PRN MEDICATIONS      VITAL SIGNS: Last 24 Hours  T(C): 35.8 (18 May 2023 13:00), Max: 36.8 (17 May 2023 20:00)  T(F): 96.5 (18 May 2023 13:00), Max: 98.3 (17 May 2023 20:00)  HR: 101 (18 May 2023 13:00) (76 - 119)  BP: 112/60 (18 May 2023 13:00) (111/78 - 149/66)  BP(mean): 92 (18 May 2023 01:00) (82 - 102)  RR: 19 (18 May 2023 04:29) (17 - 56)  SpO2: 97% (18 May 2023 02:00) (96% - 99%)    LABS:                        13.9   9.76  )-----------( 343      ( 18 May 2023 05:42 )             41.8     05-18    139  |  100  |  19  ----------------------------<  97  3.3<L>   |  25  |  0.9    Ca    9.3      18 May 2023 05:42  Mg     1.7     05-18    TPro  6.2  /  Alb  3.5  /  TBili  1.0  /  DBili  x   /  AST  31  /  ALT  24  /  AlkPhos  92  05-18

## 2023-05-18 NOTE — PHYSICAL THERAPY INITIAL EVALUATION ADULT - PERTINENT HX OF CURRENT PROBLEM, REHAB EVAL
76yo F hx of NSCLC stage 3 s/p CRT and surgery 2018, emphysema, severe mitral valve regurgitation s/p MVR, paroxysmal atrial fibrillation previously on coumadin but stopped after MVR and KOBY closure, former smoker presenting with progressive shortness of breath on exertion, and intermittent dry cough lasting for past 2 weeks. Denies chest pain, palpitations, headaches, wheezing, abdominal pain, fevers, sick contacts, nausea/vomiting. Was prescribed meds from PMD without symptomatic relief. Presented to the ED from home.

## 2023-05-18 NOTE — PHYSICAL THERAPY INITIAL EVALUATION ADULT - GENERAL OBSERVATIONS, REHAB EVAL
Patient was seen from 9:20-9:48 for PT IE. Patient was rec'd in semi reclined in bed, +IVL, NAD, agreeable to participate in PT.

## 2023-05-18 NOTE — PROGRESS NOTE ADULT - ASSESSMENT
77y F pmh NSCLC stage 3 s/p CRT/surgery 2018 (followed by Dr. Minda Lopez MD since 12/8/2017, was originally given chemotherapy with Carboplatin/Alimta with Neulasta), emphysema, severe MR s/p MVR, pAFib s/p coumadin s/p MVR and KOBY closure, past smoker presenting with dyspnea on exertion and cough x 2 weeks admitted to CCU for cardiogenic vs septic shock.     #Cardiogenic/Septic shock   #Suspected aspiration pna   #pAfib RVR  #Severe MR s/p MVR and KOBY closure   - s/p cardizem and lopressor in ER for Afib RVR, rapid response and upgrade to CCU 5/13 for AMS and hypotension   - TTE 5/13 - diffuse hypokinesis of anterior wall, EF 25-30%  - intubated for airway protection, wean as tolerated (SAT/SBT)  - CTA chest - no PE, b/l pleural effusions and ggo consistent with pulmonary edema  - CTH - no acute intracranial pathology    - TSH 7.26   - lactate and troponin wnl   - c/w levophed ggt, off dobutamine, wean as tolerated   - c/w amio ggt  - c/w cefepime [id number] IV, vancomycin d/c - MRSA -ve  - ID following, likely natali,;  Procal 0.03, deep tracheal aspirate, Bcx, w/NGTD  - UA negative  - IVC 2.0, collapsible  - consider cardioversion      #Transaminitis   - RUQ US > Unremarkable right upper quadrant abdominal ultrasound  - trend LFTs qD    #Anxiety   - c/w sertraline 25mg     # Misc  - DVT Prophylaxis: prophylactic dose  - GI Prophylaxis: pantoprazole  Injectable 40 milliGRAM(s)   - Diet: DASH  - Activity: Bedrest  - Code Status: Full 77y F pmh NSCLC stage 3 s/p CRT/surgery 2018 (followed by Dr. Minda Lopez MD since 12/8/2017, was originally given chemotherapy with Carboplatin/Alimta with Neulasta), emphysema, severe MR s/p MVR, pAFib s/p coumadin s/p MVR and KOBY closure, past smoker presenting with dyspnea on exertion and cough x 2 weeks admitted to CCU for cardiogenic vs septic shock.     #Cardiogenic/Septic shock   #Suspected aspiration pna   #pAfib RVR  #Severe MR s/p MVR and KOBY closure   - s/p cardizem and lopressor in ER for Afib RVR, rapid response and upgrade to CCU 5/13 for AMS and hypotension >> downgrade to tele on 05/17  - TTE 5/13 - diffuse hypokinesis of anterior wall, EF 25-30%  - s/p intubated for airway protection in CCU, s/p Levophed ggt. s/p amio ggt  - CTA chest - no PE, b/l pleural effusions and ggo consistent with pulmonary edema  - CTH - no acute intracranial pathology    - TSH 7.26   - s/p abx for possible PNA, dc on 05/17 as per ID, cultures neg, low procal  - stable for downgrade on 05/17    #Afib  - EP follow up for possible cardioversion  - EP said to call cardio cath for cardioversion    #Transaminitis (resolved)  - RUQ US > Unremarkable right upper quadrant abdominal ultrasound    #Anxiety   - c/w sertraline 25mg     # Misc  - DVT Prophylaxis: prophylactic dose  - GI Prophylaxis: pantoprazole  Injectable 40 milliGRAM(s)   - Diet: DASH  - Activity: Bedrest  - Code Status: Full 77y F pmh NSCLC stage 3 s/p CRT/surgery 2018 (followed by Dr. Minda Lopez MD since 2017, was originally given chemotherapy with Carboplatin/Alimta with Neulasta), emphysema, severe MR s/p MVR, pAFib s/p coumadin s/p MVR and KOBY closure, past smoker presenting with dyspnea on exertion and cough x 2 weeks admitted to CCU for cardiogenic vs septic shock.     #Cardiogenic/Septic shock   #Suspected aspiration pna   #pAfib RVR  #Severe MR s/p MVR and KOBY closure   - s/p cardizem and lopressor in ER for Afib RVR, rapid response and upgrade to CCU  for AMS and hypotension >> downgrade to tele on   - TTE  - diffuse hypokinesis of anterior wall, EF 25-30%  - s/p intubated for airway protection in CCU, s/p Levophed ggt. s/p amio ggt  - CTA chest - no PE, b/l pleural effusions and ggo consistent with pulmonary edema  - CTH - no acute intracranial pathology    - TSH 7.26   - s/p abx for possible PNA, dc on  as per ID, cultures neg, low procal  - stable for downgrade on     #Vaginal bleeding  - noted to have vaginal bleeding on  >> no bleeding during exam on   - RN received signout from CCU RN that pt have fistula  - OBGYN consulted pending    #Afib  - EP rec AMANDEEP/Cardioversion  - Spoke with cardio fellow on ; cardio will do AMANDEEP/Cardioversion on     #Transaminitis (resolved)  - RUQ US > Unremarkable right upper quadrant abdominal ultrasound    #Anxiety   - c/w sertraline 25mg     # Misc  - DVT Prophylaxis: prophylactic dose  - GI Prophylaxis: pantoprazole  Injectable 40 milliGRAM(s)   - Diet: DASH  - Activity: IAT  - Code Status: Full    Pendin) AMANDEEP/CArdioversion on   2) f/u OBGYN

## 2023-05-18 NOTE — PHYSICAL THERAPY INITIAL EVALUATION ADULT - BALANCE TRAINING, PT EVAL
Goal: By discharge: Ambulation: 150 feet with rolling walker with supervision Stairs: 4 with 1 HR with min assist

## 2023-05-19 PROBLEM — I10 ESSENTIAL (PRIMARY) HYPERTENSION: Chronic | Status: ACTIVE | Noted: 2023-05-12

## 2023-05-19 PROBLEM — I34.0 NONRHEUMATIC MITRAL (VALVE) INSUFFICIENCY: Chronic | Status: ACTIVE | Noted: 2023-05-12

## 2023-05-19 PROBLEM — I48.0 PAROXYSMAL ATRIAL FIBRILLATION: Chronic | Status: ACTIVE | Noted: 2023-05-12

## 2023-05-19 PROBLEM — C34.90 MALIGNANT NEOPLASM OF UNSPECIFIED PART OF UNSPECIFIED BRONCHUS OR LUNG: Chronic | Status: ACTIVE | Noted: 2023-05-12

## 2023-05-19 LAB
ANION GAP SERPL CALC-SCNC: 14 MMOL/L — SIGNIFICANT CHANGE UP (ref 7–14)
BUN SERPL-MCNC: 25 MG/DL — HIGH (ref 10–20)
CALCIUM SERPL-MCNC: 10.2 MG/DL — SIGNIFICANT CHANGE UP (ref 8.4–10.4)
CHLORIDE SERPL-SCNC: 102 MMOL/L — SIGNIFICANT CHANGE UP (ref 98–110)
CO2 SERPL-SCNC: 27 MMOL/L — SIGNIFICANT CHANGE UP (ref 17–32)
CREAT SERPL-MCNC: 1.3 MG/DL — SIGNIFICANT CHANGE UP (ref 0.7–1.5)
CULTURE RESULTS: SIGNIFICANT CHANGE UP
EGFR: 42 ML/MIN/1.73M2 — LOW
GLUCOSE SERPL-MCNC: 107 MG/DL — HIGH (ref 70–99)
MAGNESIUM SERPL-MCNC: 1.7 MG/DL — LOW (ref 1.8–2.4)
POTASSIUM SERPL-MCNC: 4.3 MMOL/L — SIGNIFICANT CHANGE UP (ref 3.5–5)
POTASSIUM SERPL-SCNC: 4.3 MMOL/L — SIGNIFICANT CHANGE UP (ref 3.5–5)
SODIUM SERPL-SCNC: 143 MMOL/L — SIGNIFICANT CHANGE UP (ref 135–146)
SPECIMEN SOURCE: SIGNIFICANT CHANGE UP

## 2023-05-19 PROCEDURE — 99232 SBSQ HOSP IP/OBS MODERATE 35: CPT

## 2023-05-19 RX ORDER — MAGNESIUM SULFATE 500 MG/ML
2 VIAL (ML) INJECTION ONCE
Refills: 0 | Status: COMPLETED | OUTPATIENT
Start: 2023-05-19 | End: 2023-05-19

## 2023-05-19 RX ADMIN — Medication 25 MILLIGRAM(S): at 06:43

## 2023-05-19 RX ADMIN — Medication 3 MILLIGRAM(S): at 22:31

## 2023-05-19 RX ADMIN — SENNA PLUS 2 TABLET(S): 8.6 TABLET ORAL at 22:31

## 2023-05-19 RX ADMIN — AMIODARONE HYDROCHLORIDE 400 MILLIGRAM(S): 400 TABLET ORAL at 06:43

## 2023-05-19 RX ADMIN — AMIODARONE HYDROCHLORIDE 400 MILLIGRAM(S): 400 TABLET ORAL at 22:31

## 2023-05-19 RX ADMIN — LISINOPRIL 5 MILLIGRAM(S): 2.5 TABLET ORAL at 06:43

## 2023-05-19 RX ADMIN — CHLORHEXIDINE GLUCONATE 1 APPLICATION(S): 213 SOLUTION TOPICAL at 06:44

## 2023-05-19 RX ADMIN — SERTRALINE 25 MILLIGRAM(S): 25 TABLET, FILM COATED ORAL at 13:05

## 2023-05-19 RX ADMIN — Medication 25 MILLIGRAM(S): at 13:05

## 2023-05-19 RX ADMIN — Medication 25 MILLIGRAM(S): at 01:28

## 2023-05-19 RX ADMIN — ENOXAPARIN SODIUM 60 MILLIGRAM(S): 100 INJECTION SUBCUTANEOUS at 18:23

## 2023-05-19 RX ADMIN — Medication 25 MILLIGRAM(S): at 18:23

## 2023-05-19 RX ADMIN — PANTOPRAZOLE SODIUM 40 MILLIGRAM(S): 20 TABLET, DELAYED RELEASE ORAL at 06:43

## 2023-05-19 RX ADMIN — AMIODARONE HYDROCHLORIDE 400 MILLIGRAM(S): 400 TABLET ORAL at 13:07

## 2023-05-19 RX ADMIN — ENOXAPARIN SODIUM 60 MILLIGRAM(S): 100 INJECTION SUBCUTANEOUS at 06:43

## 2023-05-19 RX ADMIN — Medication 25 GRAM(S): at 22:31

## 2023-05-19 RX ADMIN — Medication 25 MILLIGRAM(S): at 23:46

## 2023-05-19 NOTE — PROGRESS NOTE ADULT - SUBJECTIVE AND OBJECTIVE BOX
CHIEF COMPLAINT:    Patient is a 77y old  Female who presents with a chief complaint of atrial fib     INTERVAL HPI/OVERNIGHT EVENTS:    Patient seen and examined at bedside. No acute overnight events occurred.    ROS: Denies chest pain. All other systems are negative.    Medications:  Standing  aMIOdarone    Tablet 400 milliGRAM(s) Oral every 8 hours  chlorhexidine 2% Cloths 1 Application(s) Topical <User Schedule>  enoxaparin Injectable 60 milliGRAM(s) SubCutaneous every 12 hours  ipratropium  for Nebulization. 500 MICROGram(s) Nebulizer once  ipratropium 17 MICROgram(s) HFA Inhaler 1 Puff(s) Inhalation once  lisinopril 5 milliGRAM(s) Oral daily  melatonin 3 milliGRAM(s) Oral at bedtime  metoprolol tartrate 25 milliGRAM(s) Oral every 6 hours  pantoprazole    Tablet 40 milliGRAM(s) Oral before breakfast  polyethylene glycol 3350 17 Gram(s) Oral daily  senna 2 Tablet(s) Oral at bedtime  sertraline 25 milliGRAM(s) Oral daily    PRN Meds        Vital Signs:    T(F): 98 (23 @ 04:48), Max: 98 (23 @ 04:48)  HR: 108 (23 @ 04:48) (101 - 108)  BP: 119/68 (23 @ 04:48) (104/53 - 119/68)  RR: 18 (23 @ 04:48) (18 - 18)  SpO2: --  I&O's Summary    18 May 2023 07:  -  19 May 2023 07:00  --------------------------------------------------------  IN: 310 mL / OUT: 550 mL / NET: -240 mL    19 May 2023 07:  -  19 May 2023 12:09  --------------------------------------------------------  IN: 210 mL / OUT: 0 mL / NET: 210 mL      Daily     Daily Weight in k.6 (19 May 2023 04:48)  CAPILLARY BLOOD GLUCOSE          PHYSICAL EXAM:  GENERAL:  NAD  SKIN: No rashes or lesions  HEENT: Atraumatic. Normocephalic. Anicteric  NECK:  No JVD.   PULMONARY: Clear to ausculation bilaterally. No wheezing. No rales  CVS: Normal S1, S2. Regular rate and rhythm. No murmurs.  ABDOMEN/GI: Soft, Nontender, Nondistended; Bowel sounds are present  EXTREMITIES:  No edema B/L LE.  NEUROLOGIC:  No motor deficit.  PSYCH: Alert & oriented x 3, normal affect      LABS:                        13.9   9.76  )-----------( 343      ( 18 May 2023 05:42 )             41.8         139  |  100  |  19  ----------------------------<  97  3.3<L>   |  25  |  0.9    Ca    9.3      18 May 2023 05:42  Mg     1.7         TPro  6.2  /  Alb  3.5  /  TBili  1.0  /  DBili  x   /  AST  31  /  ALT  24  /  AlkPhos  92                RADIOLOGY & ADDITIONAL TESTS:  Imaging or report Personally Reviewed:  [ ] YES  [ ] NO -->no new images    Telemetry reviewed independently - afib  EKG reviewed independently -->no new EKGs    Consultant(s) Notes Reviewed:  [ ] YES  [ ] NO  Care Discussed with Consultants/Other Providers [ ] YES  [ ] NO    Case discussed with resident  Care discussed with pt

## 2023-05-19 NOTE — PROGRESS NOTE ADULT - ASSESSMENT
78 yo F PMHx NSCLC stage 3 (s/p chemo, radiation,surgery 2018)   emphysema, severe MR s/p mitral valve annulopasty, paroxysmal AFib s/p coumadin s/p KOBY closure via suture presented for evaluation of dyspnea on exertion and cough which began two weeks prior to presentation. Upon ER arrival pt found to have afib with RVR and was admitted to telemetry. She was given IV cardizem and lovenox. She was also found to have GGO on CT chest and was treated for pneumonia. Shortly after admission pt developed AMS, hypotension, lactic acidosis, hypotension. She was intubated for airway protection, found to have decreased LVEF on bedside echo. She was started on dobutamine and levophed for suspected cardiogenic shock. She was also volume overloaded and was given bumex infusion. For presumed PNA given Vancomycin, cefepime. x 2 weeks admitted to CCU for cardiogenic vs septic shock. Pt stabilized successfully extubated and downgraded on 5/18.    Cardiogenic/Septic shock   Suspected aspiration pna   Acute systolic heart failure  - pt received IV abx but was stopped after RVP was positive for rhino/enterovirus  - cultures neg, low procal  - cardiogenic shock due to acute HFrEF more likely than septic shock  - TTE 5/13 - diffuse hypokinesis of anterior wall, EF 25-30%  - cardiac cath not done--presumably acute low EF due to RVR?  - c/w metoprolol, lisinopril. Cardio follow up regarding start Farixga/Entresto  - pt currently not on lasix? she is euvolemic  - f/u cardiology if pt would benefit from ischemic work up/lifevest      pAfib with RVR s/p KOBY cloture  Severe MR s/p MV annuloplasty    - c/w therapeutic Lovenox for now as per cardiology  - pt planned for DCCV on Monday  - c/w metoprolol, amiodarone currently rate controlled      Transaminitis (resolved)  - RUQ US > Unremarkable right upper quadrant abdominal ultrasound  - likely due to shock    Anxiety   - c/w sertraline 25mg     Lung Ca  - s/p resection, chemo, radation  - tx with Carboplatin/Alimta with Neulasta  - outpt oncology follow up  - in remission    DVT px-therapeutic lovenox    #Progress Note Handoff:  Pending (specify):  Cardioversion  Family discussion: I spoke with pt as above regarding DCCV  Disposition: Home_x__/SNF___/Other________/Unknown at this time________

## 2023-05-19 NOTE — PROGRESS NOTE ADULT - ASSESSMENT
77y F pmh NSCLC stage 3 s/p CRT/surgery 2018 (followed by Dr. Minda Lopez MD since 2017, was originally given chemotherapy with Carboplatin/Alimta with Neulasta), emphysema, severe MR s/p MVR, pAFib s/p coumadin s/p MVR and KOBY closure, past smoker presenting with dyspnea on exertion and cough x 2 weeks admitted to CCU for cardiogenic vs septic shock.     #Cardiogenic/Septic shock   #Suspected aspiration pna   #pAfib RVR  #Severe MR s/p MVR and KOBY closure   - s/p cardizem and lopressor in ER for Afib RVR, rapid response and upgrade to CCU  for AMS and hypotension >> downgrade to tele on   - TTE  - diffuse hypokinesis of anterior wall, EF 25-30%  - s/p intubated for airway protection in CCU, s/p Levophed ggt. s/p amio ggt  - CTA chest - no PE, b/l pleural effusions and ggo consistent with pulmonary edema  - CTH - no acute intracranial pathology    - TSH 7.26   - s/p abx for possible PNA, dc on  as per ID, cultures neg, low procal  - stable for downgrade on     #Vaginal bleeding  - noted to have vaginal bleeding on  >> no bleeding during exam on   - RN received signout from CCU RN that pt have fistula  - OBGYN consulted pending    #Afib  - EP rec AMANDEEP/Cardioversion  - Spoke with cardio fellow on ; cardio will do AMANDEEP/Cardioversion on     #Transaminitis (resolved)  - RUQ US > Unremarkable right upper quadrant abdominal ultrasound    #Anxiety   - c/w sertraline 25mg     # Misc  - DVT Prophylaxis: prophylactic dose  - GI Prophylaxis: pantoprazole  Injectable 40 milliGRAM(s)   - Diet: DASH  - Activity: IAT  - Code Status: Full    Pendin) AMANDEEP/CArdioversion on   2) f/u OBGYN

## 2023-05-19 NOTE — PROGRESS NOTE ADULT - SUBJECTIVE AND OBJECTIVE BOX
AROLDO KO 77y Female  MRN#: [id number]   Hospital Day: 7d    SUBJECTIVE  Patient is a 77y old Female who presents with a chief complaint of atrial fib (19 May 2023 12:09)  Currently admitted to medicine with the primary diagnosis of Atrial fibrillation      INTERVAL HPI AND OVERNIGHT EVENTS:  Patient was examined and seen at bedside. This morning she is resting comfortably in bed. No issues or overnight events.    OBJECTIVE  PAST MEDICAL & SURGICAL HISTORY  HTN (hypertension)    Paroxysmal atrial fibrillation    Non-small cell lung cancer    H/O mitral valve replacement    Severe mitral valve regurgitation      ALLERGIES:  No Known Allergies    MEDICATIONS:  STANDING MEDICATIONS  aMIOdarone    Tablet 400 milliGRAM(s) Oral every 8 hours  chlorhexidine 2% Cloths 1 Application(s) Topical <User Schedule>  enoxaparin Injectable 60 milliGRAM(s) SubCutaneous every 12 hours  ipratropium  for Nebulization. 500 MICROGram(s) Nebulizer once  ipratropium 17 MICROgram(s) HFA Inhaler 1 Puff(s) Inhalation once  lisinopril 5 milliGRAM(s) Oral daily  melatonin 3 milliGRAM(s) Oral at bedtime  metoprolol tartrate 25 milliGRAM(s) Oral every 6 hours  pantoprazole    Tablet 40 milliGRAM(s) Oral before breakfast  polyethylene glycol 3350 17 Gram(s) Oral daily  senna 2 Tablet(s) Oral at bedtime  sertraline 25 milliGRAM(s) Oral daily    PRN MEDICATIONS      VITAL SIGNS: Last 24 Hours  T(C): 36.1 (19 May 2023 13:56), Max: 36.7 (19 May 2023 04:48)  T(F): 97 (19 May 2023 13:56), Max: 98 (19 May 2023 04:48)  HR: 90 (19 May 2023 13:56) (90 - 108)  BP: 92/52 (19 May 2023 13:56) (92/52 - 119/68)  BP(mean): --  RR: 18 (19 May 2023 13:56) (18 - 18)  SpO2: --    LABS:                        13.9   9.76  )-----------( 343      ( 18 May 2023 05:42 )             41.8     05-18    139  |  100  |  19  ----------------------------<  97  3.3<L>   |  25  |  0.9    Ca    9.3      18 May 2023 05:42  Mg     1.7     05-18    TPro  6.2  /  Alb  3.5  /  TBili  1.0  /  DBili  x   /  AST  31  /  ALT  24  /  AlkPhos  92  05-18

## 2023-05-20 PROCEDURE — 76830 TRANSVAGINAL US NON-OB: CPT | Mod: 26

## 2023-05-20 PROCEDURE — 99232 SBSQ HOSP IP/OBS MODERATE 35: CPT

## 2023-05-20 PROCEDURE — 99231 SBSQ HOSP IP/OBS SF/LOW 25: CPT

## 2023-05-20 RX ADMIN — Medication 3 MILLIGRAM(S): at 21:51

## 2023-05-20 RX ADMIN — AMIODARONE HYDROCHLORIDE 400 MILLIGRAM(S): 400 TABLET ORAL at 06:23

## 2023-05-20 RX ADMIN — AMIODARONE HYDROCHLORIDE 400 MILLIGRAM(S): 400 TABLET ORAL at 21:51

## 2023-05-20 RX ADMIN — Medication 25 MILLIGRAM(S): at 17:47

## 2023-05-20 RX ADMIN — LISINOPRIL 5 MILLIGRAM(S): 2.5 TABLET ORAL at 06:26

## 2023-05-20 RX ADMIN — CHLORHEXIDINE GLUCONATE 1 APPLICATION(S): 213 SOLUTION TOPICAL at 06:27

## 2023-05-20 RX ADMIN — AMIODARONE HYDROCHLORIDE 400 MILLIGRAM(S): 400 TABLET ORAL at 14:08

## 2023-05-20 RX ADMIN — SERTRALINE 25 MILLIGRAM(S): 25 TABLET, FILM COATED ORAL at 11:14

## 2023-05-20 RX ADMIN — PANTOPRAZOLE SODIUM 40 MILLIGRAM(S): 20 TABLET, DELAYED RELEASE ORAL at 06:24

## 2023-05-20 RX ADMIN — SENNA PLUS 2 TABLET(S): 8.6 TABLET ORAL at 21:51

## 2023-05-20 RX ADMIN — Medication 25 MILLIGRAM(S): at 11:14

## 2023-05-20 RX ADMIN — Medication 25 MILLIGRAM(S): at 06:24

## 2023-05-20 RX ADMIN — Medication 25 MILLIGRAM(S): at 23:16

## 2023-05-20 RX ADMIN — ENOXAPARIN SODIUM 60 MILLIGRAM(S): 100 INJECTION SUBCUTANEOUS at 17:47

## 2023-05-20 RX ADMIN — ENOXAPARIN SODIUM 60 MILLIGRAM(S): 100 INJECTION SUBCUTANEOUS at 06:23

## 2023-05-20 NOTE — CONSULT NOTE ADULT - ASSESSMENT
A/P: 76yo P2, postmenopausal, PMHx NSCLC (s/p resection, chemo, and RT, in remission as of March 2023), mitral valve prolapse (s/p mitral valve replacement), Afib with RVR (on therapeutic lovenox, EF 25-30%), with hematuria, without vaginal bleeding, no evidence of fistula, clinically and hemodynamically stable.    - no acute gyn intervention at this time  - will follow up transvaginal ultrasound  - recommend straight cath for UA and UCx  - recommend urology consult regarding hematuria  - care per primary team    Dr. Gaines and Dr. Chan aware

## 2023-05-20 NOTE — CONSULT NOTE ADULT - ATTENDING COMMENTS
78 y/o with history of NSCLC and treatment, now in remission, here for management of atrial fibrillation, with hematuria. GYN consulted for concern of vesicovaginal fistula as a source of the hematuria. Patient has not had any pelvic surgery or pelvic radiation. On exam no evidence of any vaginal bleeding, or fluid within the vagina. Unlikely fistula. Recommend urology evaluation for hematuria. Reconsult gyn if desired.

## 2023-05-20 NOTE — PROGRESS NOTE ADULT - SUBJECTIVE AND OBJECTIVE BOX
CHIEF COMPLAINT:    Patient is a 77y old  Female who presents with a chief complaint of atrial fib     INTERVAL HPI/OVERNIGHT EVENTS:    Patient seen and examined at bedside. No acute overnight events occurred.    ROS: Denies palpitations, hematuria, vaginal bleeding. All other systems are negative.    Medications:  Standing  aMIOdarone    Tablet 400 milliGRAM(s) Oral every 8 hours  chlorhexidine 2% Cloths 1 Application(s) Topical <User Schedule>  enoxaparin Injectable 60 milliGRAM(s) SubCutaneous every 12 hours  ipratropium  for Nebulization. 500 MICROGram(s) Nebulizer once  ipratropium 17 MICROgram(s) HFA Inhaler 1 Puff(s) Inhalation once  lisinopril 5 milliGRAM(s) Oral daily  melatonin 3 milliGRAM(s) Oral at bedtime  metoprolol tartrate 25 milliGRAM(s) Oral every 6 hours  pantoprazole    Tablet 40 milliGRAM(s) Oral before breakfast  polyethylene glycol 3350 17 Gram(s) Oral daily  senna 2 Tablet(s) Oral at bedtime  sertraline 25 milliGRAM(s) Oral daily    PRN Meds        Vital Signs:    T(F): 97.4 (23 @ 14:00), Max: 97.4 (23 @ 20:05)  HR: 78 (23 @ 14:00) (71 - 104)  BP: 93/45 (23 @ 14:00) (93/45 - 153/65)  RR: 18 (23 @ 05:33) (18 - 18)  SpO2: --  I&O's Summary    19 May 2023 07:  -  20 May 2023 07:00  --------------------------------------------------------  IN: 450 mL / OUT: 0 mL / NET: 450 mL    20 May 2023 07:  -  20 May 2023 15:24  --------------------------------------------------------  IN: 330 mL / OUT: 0 mL / NET: 330 mL      Daily     Daily Weight in k.1 (20 May 2023 05:33)  CAPILLARY BLOOD GLUCOSE          PHYSICAL EXAM:  GENERAL:  NAD  SKIN: No rashes or lesions  HEENT: Atraumatic. Normocephalic. Anicteric  NECK:  No JVD.   PULMONARY: Clear to ausculation bilaterally. No wheezing. No rales  CVS: Normal S1, S2. Regular rate and rhythm. No murmurs.  ABDOMEN/GI: Soft, Nontender, Nondistended; Bowel sounds are present  EXTREMITIES:  No edema B/L LE.  NEUROLOGIC:  No motor deficit.  PSYCH: Alert & oriented x 3, normal affect      LABS:        143  |  102  |  25<H>  ----------------------------<  107<H>  4.3   |  27  |  1.3    Ca    10.2      19 May 2023 16:30  Mg     1.7                     RADIOLOGY & ADDITIONAL TESTS:  Imaging or report Personally Reviewed:  [ ] YES  [ ] NO -->no new images    Telemetry reviewed independently - afib  EKG reviewed independently -->no new EKGs    Consultant(s) Notes Reviewed:  [ ] YES  [ ] NO  Care Discussed with Consultants/Other Providers [ ] YES  [ ] NO    Case discussed with resident  Care discussed with pt

## 2023-05-20 NOTE — PROGRESS NOTE ADULT - ASSESSMENT
76 yo F PMHx NSCLC stage 3 (s/p chemo, radiation,surgery 2018)   emphysema, severe MR s/p mitral valve annulopasty, paroxysmal AFib s/p coumadin s/p KOBY closure via suture presented for evaluation of dyspnea on exertion and cough which began two weeks prior to presentation. Upon ER arrival pt found to have afib with RVR and was admitted to telemetry. She was given IV cardizem and lovenox. She was also found to have GGO on CT chest and was treated for pneumonia. Shortly after admission pt developed AMS, hypotension, lactic acidosis, hypotension. She was intubated for airway protection, found to have decreased LVEF on bedside echo. She was started on dobutamine and levophed for suspected cardiogenic shock. She was also volume overloaded and was given Bumex infusion. For presumed PNA given Vancomycin, cefepime. x 2 weeks admitted to CCU for cardiogenic vs septic shock. Pt stabilized successfully extubated and downgraded on 5/18.    Cardiogenic/Septic shock   Suspected aspiration pna   Acute systolic heart failure  - pt received IV abx but was stopped after RVP was positive for rhino/enterovirus  - cultures neg, low procal  - cardiogenic shock due to acute HFrEF more likely than septic shock  - TTE 5/13 - diffuse hypokinesis of anterior wall, EF 25-30%  - cardiac cath not done--presumably acute low EF due to RVR?  - c/w metoprolol, lisinopril. Cardio follow up regarding start Farixga/Entresto  - pt currently not on lasix? she is euvolemic  - f/u cardiology if pt would benefit from ischemic work up/lifevest      pAfib with RVR s/p KOBY cloture  Severe MR s/p MV annuloplasty    - c/w therapeutic Lovenox for now as per cardiology  - pt planned for DCCV on Monday  - c/w metoprolol, amiodarone currently rate controlled      Transaminitis (resolved)  - RUQ US > Unremarkable right upper quadrant abdominal ultrasound  - likely due to shock    Vaginal bleeding  - witness by nursing staff two days ago when pt got up to ambulate  - currently no bleeding  - check UA for hematuria  - no signs of GIB  - check transvaginal US    Anxiety   - c/w sertraline 25mg     Lung Ca  - s/p resection, chemo, radation  - tx with Carboplatin/Alimta with Neulasta  - outpt oncology follow up  - in remission    DVT px-therapeutic lovenox    #Progress Note Handoff:  Pending (specify):  Cardioversion  Family discussion: I spoke with pt as above regarding DCCV  Disposition: Home_x__/SNF___/Other________/Unknown at this time________

## 2023-05-20 NOTE — CONSULT NOTE ADULT - SUBJECTIVE AND OBJECTIVE BOX
OBGYN PGY1    Chief Complaint: cardiogenic shock    HPI: 78yo P2, postmenopausal, PMHx NSCLC (s/p resection, chemo, and RT, in remission as of 2023), mitral valve prolapse (s/p mitral valve replacement), Afib with RVR (on therapeutic lovenox, EF 25-30%), is admitted for cardiogenic shock, clinically improving. 4 days ago patient started notice hematuria. Does not notice blood while wiping or in her underwear. Denies dysuria and increased frequency. She denies history of postmenopausal bleeding. She has not seen a gyn in 20 years.    Ob/Gyn History:  P2, s/p 2   menopausal at 49yo  Denies history of ovarian cysts, uterine fibroids, abnormal paps, or STIs    Denies the following: constitutional symptoms, visual symptoms, cardiovascular symptoms, respiratory symptoms, GI symptoms, musculoskeletal symptoms, skin symptoms, neurologic symptoms, hematologic symptoms, allergic symptoms, psychiatric symptoms  Except any pertinent positives listed.     Home Medications:  metoprolol succinate 25 mg oral tablet, extended release: 1 tab(s) orally once a day (13 May 2023 00:17)  sertraline 25 mg oral tablet: 1 tab(s) orally once a day (13 May 2023 00:17)    No Known Allergies    PAST MEDICAL & SURGICAL HISTORY:  HTN (hypertension)  Paroxysmal atrial fibrillation  Non-small cell lung cancer  H/O mitral valve replacement  Severe mitral valve regurgitation    FAMILY HISTORY:  denies    SOCIAL HISTORY: Denies cigarette use, alcohol use, or illicit drug use    Vital Signs Last 24 Hrs  T(F): 97.1 (20 May 2023 05:33), Max: 97.4 (19 May 2023 20:05)  HR: 95 (20 May 2023 05:33) (71 - 104)  BP: 130/69 (20 May 2023 05:33) (92/52 - 153/65)  RR: 18 (20 May 2023 05:33) (18 - 18)    General Appearance - AAOx3, NAD  Heart - S1S2 regular rate and rhythm  Lung - CTA Bilaterally  Abdomen - Soft, nontender, nondistended, no rebound, no rigidity, no guarding, bowel sounds present    GYN/Pelvis:  External genitalia: normal external genitalia  Vagina: atrophic vaginal wall, no blood in vagina, no lacerations, no pooling, no urine, no stool  Cervix: no blood from cervix, no cervical motion tenderness  Uterus: no fundal tenderness, small, mobile  Adnexa: no fullness, tenderness, masses palpated in bilateral adnexa    LABS:  9.76>13.9/41.8<343    05    143  |  102  |  25<H>  ----------------------------<  107<H>  4.3   |  27  |  1.3    Ca    10.2      19 May 2023 16:30  Mg     1.7         RADIOLOGY & ADDITIONAL STUDIES:

## 2023-05-21 LAB
ALBUMIN SERPL ELPH-MCNC: 3.7 G/DL — SIGNIFICANT CHANGE UP (ref 3.5–5.2)
ALP SERPL-CCNC: 72 U/L — SIGNIFICANT CHANGE UP (ref 30–115)
ALT FLD-CCNC: 25 U/L — SIGNIFICANT CHANGE UP (ref 0–41)
ANION GAP SERPL CALC-SCNC: 7 MMOL/L — SIGNIFICANT CHANGE UP (ref 7–14)
APPEARANCE UR: ABNORMAL
APTT BLD: 35.7 SEC — SIGNIFICANT CHANGE UP (ref 27–39.2)
AST SERPL-CCNC: 29 U/L — SIGNIFICANT CHANGE UP (ref 0–41)
BACTERIA # UR AUTO: NEGATIVE — SIGNIFICANT CHANGE UP
BASOPHILS # BLD AUTO: 0.06 K/UL — SIGNIFICANT CHANGE UP (ref 0–0.2)
BASOPHILS NFR BLD AUTO: 0.8 % — SIGNIFICANT CHANGE UP (ref 0–1)
BILIRUB SERPL-MCNC: 0.5 MG/DL — SIGNIFICANT CHANGE UP (ref 0.2–1.2)
BILIRUB UR-MCNC: NEGATIVE — SIGNIFICANT CHANGE UP
BLD GP AB SCN SERPL QL: SIGNIFICANT CHANGE UP
BUN SERPL-MCNC: 29 MG/DL — HIGH (ref 10–20)
CALCIUM SERPL-MCNC: 9.4 MG/DL — SIGNIFICANT CHANGE UP (ref 8.4–10.5)
CHLORIDE SERPL-SCNC: 102 MMOL/L — SIGNIFICANT CHANGE UP (ref 98–110)
CO2 SERPL-SCNC: 30 MMOL/L — SIGNIFICANT CHANGE UP (ref 17–32)
COLOR SPEC: YELLOW — SIGNIFICANT CHANGE UP
CREAT SERPL-MCNC: 1.2 MG/DL — SIGNIFICANT CHANGE UP (ref 0.7–1.5)
DIFF PNL FLD: ABNORMAL
EGFR: 46 ML/MIN/1.73M2 — LOW
EOSINOPHIL # BLD AUTO: 0.11 K/UL — SIGNIFICANT CHANGE UP (ref 0–0.7)
EOSINOPHIL NFR BLD AUTO: 1.5 % — SIGNIFICANT CHANGE UP (ref 0–8)
EPI CELLS # UR: 4 /HPF — SIGNIFICANT CHANGE UP (ref 0–5)
GLUCOSE SERPL-MCNC: 95 MG/DL — SIGNIFICANT CHANGE UP (ref 70–99)
GLUCOSE UR QL: NEGATIVE — SIGNIFICANT CHANGE UP
HCT VFR BLD CALC: 38.1 % — SIGNIFICANT CHANGE UP (ref 37–47)
HGB BLD-MCNC: 12.7 G/DL — SIGNIFICANT CHANGE UP (ref 12–16)
HYALINE CASTS # UR AUTO: 2 /LPF — SIGNIFICANT CHANGE UP (ref 0–7)
IMM GRANULOCYTES NFR BLD AUTO: 0.6 % — HIGH (ref 0.1–0.3)
INR BLD: 1.06 RATIO — SIGNIFICANT CHANGE UP (ref 0.65–1.3)
KETONES UR-MCNC: SIGNIFICANT CHANGE UP
LEUKOCYTE ESTERASE UR-ACNC: ABNORMAL
LYMPHOCYTES # BLD AUTO: 1.17 K/UL — LOW (ref 1.2–3.4)
LYMPHOCYTES # BLD AUTO: 16.4 % — LOW (ref 20.5–51.1)
MAGNESIUM SERPL-MCNC: 1.7 MG/DL — LOW (ref 1.8–2.4)
MCHC RBC-ENTMCNC: 31.2 PG — HIGH (ref 27–31)
MCHC RBC-ENTMCNC: 33.3 G/DL — SIGNIFICANT CHANGE UP (ref 32–37)
MCV RBC AUTO: 93.6 FL — SIGNIFICANT CHANGE UP (ref 81–99)
MONOCYTES # BLD AUTO: 0.53 K/UL — SIGNIFICANT CHANGE UP (ref 0.1–0.6)
MONOCYTES NFR BLD AUTO: 7.4 % — SIGNIFICANT CHANGE UP (ref 1.7–9.3)
NEUTROPHILS # BLD AUTO: 5.21 K/UL — SIGNIFICANT CHANGE UP (ref 1.4–6.5)
NEUTROPHILS NFR BLD AUTO: 73.3 % — SIGNIFICANT CHANGE UP (ref 42.2–75.2)
NITRITE UR-MCNC: NEGATIVE — SIGNIFICANT CHANGE UP
NRBC # BLD: 0 /100 WBCS — SIGNIFICANT CHANGE UP (ref 0–0)
PH UR: 6.5 — SIGNIFICANT CHANGE UP (ref 5–8)
PHOSPHATE SERPL-MCNC: 3.1 MG/DL — SIGNIFICANT CHANGE UP (ref 2.1–4.9)
PLATELET # BLD AUTO: 284 K/UL — SIGNIFICANT CHANGE UP (ref 130–400)
PMV BLD: 9.1 FL — SIGNIFICANT CHANGE UP (ref 7.4–10.4)
POTASSIUM SERPL-MCNC: 4.2 MMOL/L — SIGNIFICANT CHANGE UP (ref 3.5–5)
POTASSIUM SERPL-SCNC: 4.2 MMOL/L — SIGNIFICANT CHANGE UP (ref 3.5–5)
PROT SERPL-MCNC: 6 G/DL — SIGNIFICANT CHANGE UP (ref 6–8)
PROT UR-MCNC: ABNORMAL
PROTHROM AB SERPL-ACNC: 12.1 SEC — SIGNIFICANT CHANGE UP (ref 9.95–12.87)
RBC # BLD: 4.07 M/UL — LOW (ref 4.2–5.4)
RBC # FLD: 13.5 % — SIGNIFICANT CHANGE UP (ref 11.5–14.5)
RBC CASTS # UR COMP ASSIST: >720 /HPF — HIGH (ref 0–4)
SODIUM SERPL-SCNC: 139 MMOL/L — SIGNIFICANT CHANGE UP (ref 135–146)
SP GR SPEC: 1.02 — SIGNIFICANT CHANGE UP (ref 1.01–1.03)
UROBILINOGEN FLD QL: SIGNIFICANT CHANGE UP
WBC # BLD: 7.12 K/UL — SIGNIFICANT CHANGE UP (ref 4.8–10.8)
WBC # FLD AUTO: 7.12 K/UL — SIGNIFICANT CHANGE UP (ref 4.8–10.8)
WBC UR QL: 12 /HPF — HIGH (ref 0–5)

## 2023-05-21 PROCEDURE — 99233 SBSQ HOSP IP/OBS HIGH 50: CPT

## 2023-05-21 RX ORDER — MAGNESIUM SULFATE 500 MG/ML
2 VIAL (ML) INJECTION
Refills: 0 | Status: COMPLETED | OUTPATIENT
Start: 2023-05-21 | End: 2023-05-21

## 2023-05-21 RX ADMIN — PANTOPRAZOLE SODIUM 40 MILLIGRAM(S): 20 TABLET, DELAYED RELEASE ORAL at 06:10

## 2023-05-21 RX ADMIN — Medication 3 MILLIGRAM(S): at 21:48

## 2023-05-21 RX ADMIN — Medication 25 MILLIGRAM(S): at 05:35

## 2023-05-21 RX ADMIN — AMIODARONE HYDROCHLORIDE 400 MILLIGRAM(S): 400 TABLET ORAL at 13:08

## 2023-05-21 RX ADMIN — Medication 25 MILLIGRAM(S): at 23:11

## 2023-05-21 RX ADMIN — AMIODARONE HYDROCHLORIDE 400 MILLIGRAM(S): 400 TABLET ORAL at 21:48

## 2023-05-21 RX ADMIN — SERTRALINE 25 MILLIGRAM(S): 25 TABLET, FILM COATED ORAL at 11:58

## 2023-05-21 RX ADMIN — LISINOPRIL 5 MILLIGRAM(S): 2.5 TABLET ORAL at 05:35

## 2023-05-21 RX ADMIN — ENOXAPARIN SODIUM 60 MILLIGRAM(S): 100 INJECTION SUBCUTANEOUS at 17:23

## 2023-05-21 RX ADMIN — SENNA PLUS 2 TABLET(S): 8.6 TABLET ORAL at 21:48

## 2023-05-21 RX ADMIN — Medication 25 GRAM(S): at 17:23

## 2023-05-21 RX ADMIN — ENOXAPARIN SODIUM 60 MILLIGRAM(S): 100 INJECTION SUBCUTANEOUS at 05:35

## 2023-05-21 RX ADMIN — CHLORHEXIDINE GLUCONATE 1 APPLICATION(S): 213 SOLUTION TOPICAL at 06:10

## 2023-05-21 RX ADMIN — AMIODARONE HYDROCHLORIDE 400 MILLIGRAM(S): 400 TABLET ORAL at 05:35

## 2023-05-21 RX ADMIN — Medication 25 GRAM(S): at 19:43

## 2023-05-21 RX ADMIN — Medication 25 MILLIGRAM(S): at 11:57

## 2023-05-21 RX ADMIN — Medication 25 MILLIGRAM(S): at 17:23

## 2023-05-21 NOTE — CONSULT NOTE ADULT - ASSESSMENT
Pt is a 76yo Female with PMH/PSH including NSCLC s/p resection & XRT, emphysema, AFib who presented to the hospital on 5/12 w/ worsening SOB & cough x 2 weeks, admitted with cardiogenic shock, AFib w/ RVR.  consulted for hematuria - pt reporting this to have since resolved.    PLAN:  -No acute urologic intervention at this point in time  -UA 5/21 microscopic hematuria  -Send UCx  -Could consider RBUS if Cr uptrends  -Outpatient follow-up with  for hematuria work-up  -Pt planned for cardioversion tomorrow & will need to continue to be on therapeutic AC -- please recall  if hematuria returns post-procedure  -Will discuss with attending

## 2023-05-21 NOTE — CONSULT NOTE ADULT - SUBJECTIVE AND OBJECTIVE BOX
Pt is a 76yo Female with PMH/PSH including NSCLC s/p resection & XRT, emphysema, AFib who presented to the hospital on  w/ worsening SOB & cough x 2 weeks, admitted with cardiogenic shock, AFib w/ RVR.     consulted for hematuria. Patient states that she developed hematuria after using a primafit a few days ago. States that urine was initially a tea colored, has since cleared this afternoon. Denies any fevers, chills, abdominal/flank/suprapubic pain, n/v, dysuria, urgency, frequency, urgency, hesitancy. Denies similar previous episodes or ever following with a urologist.    PAST MEDICAL & SURGICAL HISTORY:  HTN (hypertension)  Paroxysmal atrial fibrillation  Non-small cell lung cancer  H/O mitral valve replacement  Severe mitral valve regurgitation    MEDICATIONS  (STANDING):  aMIOdarone    Tablet 400 milliGRAM(s) Oral every 8 hours  chlorhexidine 2% Cloths 1 Application(s) Topical <User Schedule>  enoxaparin Injectable 60 milliGRAM(s) SubCutaneous every 12 hours  ipratropium  for Nebulization. 500 MICROGram(s) Nebulizer once  ipratropium 17 MICROgram(s) HFA Inhaler 1 Puff(s) Inhalation once  lisinopril 5 milliGRAM(s) Oral daily  magnesium sulfate  IVPB 2 Gram(s) IV Intermittent every 2 hours  melatonin 3 milliGRAM(s) Oral at bedtime  metoprolol tartrate 25 milliGRAM(s) Oral every 6 hours  pantoprazole    Tablet 40 milliGRAM(s) Oral before breakfast  polyethylene glycol 3350 17 Gram(s) Oral daily  senna 2 Tablet(s) Oral at bedtime  sertraline 25 milliGRAM(s) Oral daily    Allergies  No Known Allergies    SOCIAL HISTORY: No illicit drug use    REVIEW OF SYSTEMS   [x] A ten-point review of systems was otherwise negative except as noted.    Vital Signs Last 24 Hrs  T(C): 36.1 (21 May 2023 13:30), Max: 36.4 (21 May 2023 05:42)  T(F): 96.9 (21 May 2023 13:30), Max: 97.6 (21 May 2023 05:42)  HR: 74 (21 May 2023 13:30) (74 - 98)  BP: 109/51 (21 May 2023 13:30) (108/62 - 125/59)  RR: 18 (21 May 2023 13:30) (18 - 18)    PHYSICAL EXAM:  GEN: Thin elderly female in NAD, awake and alert.  SKIN: Good color, non diaphoretic.  RESP: Non-labored breathing. No use of accessory muscles.  CARDIO: +S1/S2  ABDO: Soft, NT/ND, no palpable bladder, no suprapubic tenderness.  BACK: No CVAT B/L.  : Voiding freely.  EXT: UKRTZ x 4    I&O's Summary  20 May 2023 07:01  -  21 May 2023 07:00  --------------------------------------------------------  IN: 330 mL / OUT: 0 mL / NET: 330 mL    21 May 2023 07:01  -  21 May 2023 16:35  --------------------------------------------------------  IN: 286 mL / OUT: 0 mL / NET: 286 mL    LABS:             12.7   7.12  )-----------( 284      ( 21 May 2023 05:31 )             38.1     21  139  |  102  |  29<H>  ----------------------------<  95  4.2   |  30  |  1.2    Ca    9.4      21 May 2023 05:31  Phos  3.1       Mg     1.7         TPro  6.0  /  Alb  3.7  /  TBili  0.5  /  DBili  x   /  AST  29  /  ALT  25  /  AlkPhos  72      Urinalysis Basic - ( 21 May 2023 03:00 )  Color: Yellow / Appearance: Slightly Turbid / S.025 / pH: x  Gluc: x / Ketone: Trace  / Bili: Negative / Urobili: <2 mg/dL   Blood: x / Protein: 100 mg/dL / Nitrite: Negative   Leuk Esterase: Moderate / RBC: >720 /HPF / WBC 12 /HPF   Sq Epi: x / Non Sq Epi: x / Bacteria: Negative

## 2023-05-21 NOTE — PROGRESS NOTE ADULT - SUBJECTIVE AND OBJECTIVE BOX
CHIEF COMPLAINT:    Patient is a 77y old  Female who presents with a chief complaint of atrial fib     INTERVAL HPI/OVERNIGHT EVENTS:    Patient seen and examined at bedside. No acute overnight events occurred.    ROS: Denies SOB, chest pain. All other systems are negative.    Medications:  Standing  aMIOdarone    Tablet 400 milliGRAM(s) Oral every 8 hours  chlorhexidine 2% Cloths 1 Application(s) Topical <User Schedule>  enoxaparin Injectable 60 milliGRAM(s) SubCutaneous every 12 hours  ipratropium  for Nebulization. 500 MICROGram(s) Nebulizer once  ipratropium 17 MICROgram(s) HFA Inhaler 1 Puff(s) Inhalation once  lisinopril 5 milliGRAM(s) Oral daily  melatonin 3 milliGRAM(s) Oral at bedtime  metoprolol tartrate 25 milliGRAM(s) Oral every 6 hours  pantoprazole    Tablet 40 milliGRAM(s) Oral before breakfast  polyethylene glycol 3350 17 Gram(s) Oral daily  senna 2 Tablet(s) Oral at bedtime  sertraline 25 milliGRAM(s) Oral daily    PRN Meds        Vital Signs:    T(F): 96.9 (23 @ 13:30), Max: 97.6 (23 @ 05:42)  HR: 74 (23 @ 13:30) (74 - 98)  BP: 109/51 (23 @ 13:30) (108/62 - 125/59)  RR: 18 (23 @ 13:30) (18 - 18)  SpO2: --  I&O's Summary    20 May 2023 07:  -  21 May 2023 07:00  --------------------------------------------------------  IN: 330 mL / OUT: 0 mL / NET: 330 mL    21 May 2023 07:  -  21 May 2023 14:53  --------------------------------------------------------  IN: 286 mL / OUT: 0 mL / NET: 286 mL      Daily     Daily Weight in k.1 (21 May 2023 05:42)  CAPILLARY BLOOD GLUCOSE          PHYSICAL EXAM:  GENERAL:  NAD  SKIN: No rashes or lesions  HEENT: Atraumatic. Normocephalic. Anicteric  NECK:  No JVD.   PULMONARY: Clear to ausculation bilaterally. No wheezing. No rales  CVS: Normal S1, S2. Regular rate and rhythm. No murmurs.  ABDOMEN/GI: Soft, Nontender, Nondistended; Bowel sounds are present  EXTREMITIES:  No edema B/L LE.  NEUROLOGIC:  No motor deficit.  PSYCH: Alert & oriented x 3, normal affect      LABS:                        12.7   7.12  )-----------( 284      ( 21 May 2023 05:31 )             38.1         139  |  102  |  29<H>  ----------------------------<  95  4.2   |  30  |  1.2    Ca    9.4      21 May 2023 05:31  Phos  3.1       Mg     1.7         TPro  6.0  /  Alb  3.7  /  TBili  0.5  /  DBili  x   /  AST  29  /  ALT  25  /  AlkPhos  72                RADIOLOGY & ADDITIONAL TESTS:  Imaging or report Personally Reviewed:  [ ] YES  [ ] NO -->no new images    Telemetry reviewed independently - NSR, no acute events  EKG reviewed independently -->no new EKGs    Consultant(s) Notes Reviewed:  [ ] YES  [ ] NO  Care Discussed with Consultants/Other Providers [ ] YES  [ ] NO    Case discussed with resident  Care discussed with pt

## 2023-05-21 NOTE — CONSULT NOTE ADULT - NS ATTEND AMEND GEN_ALL_CORE FT
Agree with above  Cystoscopy as outpatient  Urine Cytology
complex patient  recommend restoration of sinus rhythm when able to tolerate sedation

## 2023-05-21 NOTE — PROGRESS NOTE ADULT - ASSESSMENT
76 yo F PMHx NSCLC stage 3 (s/p chemo, radiation,surgery 2018)   emphysema, severe MR s/p mitral valve annulopasty, paroxysmal AFib s/p coumadin s/p KOBY closure via suture presented for evaluation of dyspnea on exertion and cough which began two weeks prior to presentation. Upon ER arrival pt found to have afib with RVR and was admitted to telemetry. She was given IV cardizem and lovenox. She was also found to have GGO on CT chest and was treated for pneumonia. Shortly after admission pt developed AMS, hypotension, lactic acidosis, hypotension. She was intubated for airway protection, found to have decreased LVEF on bedside echo. She was started on dobutamine and levophed for suspected cardiogenic shock. She was also volume overloaded and was given Bumex infusion. For presumed PNA given Vancomycin, cefepime. x 2 weeks admitted to CCU for cardiogenic vs septic shock. Pt stabilized successfully extubated and downgraded on 5/18.    Cardiogenic/Septic shock   Suspected aspiration pna   Acute systolic heart failure  - pt received IV abx but was stopped after RVP was positive for rhino/enterovirus  - cultures neg, low procal  - cardiogenic shock due to acute HFrEF more likely than septic shock  - TTE 5/13 - diffuse hypokinesis of anterior wall, EF 25-30%  - cardiac cath not done--presumably acute low EF due to RVR?  - c/w metoprolol, lisinopril. Cardio follow up regarding start Farixga/Entresto  - pt currently not on lasix? she is euvolemic  - f/u cardiology if pt would benefit from ischemic work up/lifevest      pAfib with RVR s/p KOBY cloture  Severe MR s/p MV annuloplasty    - c/w therapeutic Lovenox for now as per cardiology  - pt planned for DCCV on Monday  - c/w metoprolol, amiodarone currently rate controlled      Transaminitis (resolved)  - RUQ US > Unremarkable right upper quadrant abdominal ultrasound  - likely due to shock    Vaginal bleeding  - witness by nursing staff two days ago when pt got up to ambulate  - currently no bleeding  - check UA for hematuria  - no signs of GIB  - check transvaginal US    Anxiety   - c/w sertraline 25mg     Lung Ca  - s/p resection, chemo, radiation  - tx with Carboplatin/Alimta with Neulasta  - outpt oncology follow up  - in remission    DVT px-therapeutic lovenox    #Progress Note Handoff:  Pending (specify):  Cardioversion  Family discussion: I spoke with pt as above regarding DCCV  Disposition: Home_x__/SNF___/Other________/Unknown at this time________

## 2023-05-21 NOTE — CONSULT NOTE ADULT - CONSULT REQUESTED DATE/TIME
13-May-2023 10:23
21-May-2023 16:35
16-May-2023 14:14
15-May-2023 13:48
17-May-2023 14:41
20-May-2023 09:58

## 2023-05-22 LAB
ALBUMIN SERPL ELPH-MCNC: 3.4 G/DL — LOW (ref 3.5–5.2)
ALP SERPL-CCNC: 65 U/L — SIGNIFICANT CHANGE UP (ref 30–115)
ALT FLD-CCNC: 22 U/L — SIGNIFICANT CHANGE UP (ref 0–41)
ANION GAP SERPL CALC-SCNC: 12 MMOL/L — SIGNIFICANT CHANGE UP (ref 7–14)
AST SERPL-CCNC: 25 U/L — SIGNIFICANT CHANGE UP (ref 0–41)
BASOPHILS # BLD AUTO: 0.06 K/UL — SIGNIFICANT CHANGE UP (ref 0–0.2)
BASOPHILS NFR BLD AUTO: 0.9 % — SIGNIFICANT CHANGE UP (ref 0–1)
BILIRUB SERPL-MCNC: 0.5 MG/DL — SIGNIFICANT CHANGE UP (ref 0.2–1.2)
BUN SERPL-MCNC: 23 MG/DL — HIGH (ref 10–20)
CALCIUM SERPL-MCNC: 9.2 MG/DL — SIGNIFICANT CHANGE UP (ref 8.4–10.5)
CHLORIDE SERPL-SCNC: 106 MMOL/L — SIGNIFICANT CHANGE UP (ref 98–110)
CO2 SERPL-SCNC: 25 MMOL/L — SIGNIFICANT CHANGE UP (ref 17–32)
CREAT SERPL-MCNC: 0.9 MG/DL — SIGNIFICANT CHANGE UP (ref 0.7–1.5)
EGFR: 65 ML/MIN/1.73M2 — SIGNIFICANT CHANGE UP
EOSINOPHIL # BLD AUTO: 0.11 K/UL — SIGNIFICANT CHANGE UP (ref 0–0.7)
EOSINOPHIL NFR BLD AUTO: 1.6 % — SIGNIFICANT CHANGE UP (ref 0–8)
GLUCOSE SERPL-MCNC: 97 MG/DL — SIGNIFICANT CHANGE UP (ref 70–99)
HCT VFR BLD CALC: 37.1 % — SIGNIFICANT CHANGE UP (ref 37–47)
HGB BLD-MCNC: 12.1 G/DL — SIGNIFICANT CHANGE UP (ref 12–16)
IMM GRANULOCYTES NFR BLD AUTO: 0.6 % — HIGH (ref 0.1–0.3)
LYMPHOCYTES # BLD AUTO: 0.86 K/UL — LOW (ref 1.2–3.4)
LYMPHOCYTES # BLD AUTO: 12.5 % — LOW (ref 20.5–51.1)
MAGNESIUM SERPL-MCNC: 1.9 MG/DL — SIGNIFICANT CHANGE UP (ref 1.8–2.4)
MCHC RBC-ENTMCNC: 30.5 PG — SIGNIFICANT CHANGE UP (ref 27–31)
MCHC RBC-ENTMCNC: 32.6 G/DL — SIGNIFICANT CHANGE UP (ref 32–37)
MCV RBC AUTO: 93.5 FL — SIGNIFICANT CHANGE UP (ref 81–99)
MONOCYTES # BLD AUTO: 0.48 K/UL — SIGNIFICANT CHANGE UP (ref 0.1–0.6)
MONOCYTES NFR BLD AUTO: 7 % — SIGNIFICANT CHANGE UP (ref 1.7–9.3)
NEUTROPHILS # BLD AUTO: 5.34 K/UL — SIGNIFICANT CHANGE UP (ref 1.4–6.5)
NEUTROPHILS NFR BLD AUTO: 77.4 % — HIGH (ref 42.2–75.2)
NRBC # BLD: 0 /100 WBCS — SIGNIFICANT CHANGE UP (ref 0–0)
PHOSPHATE SERPL-MCNC: 3.1 MG/DL — SIGNIFICANT CHANGE UP (ref 2.1–4.9)
PLATELET # BLD AUTO: 260 K/UL — SIGNIFICANT CHANGE UP (ref 130–400)
PMV BLD: 9.2 FL — SIGNIFICANT CHANGE UP (ref 7.4–10.4)
POTASSIUM SERPL-MCNC: 4 MMOL/L — SIGNIFICANT CHANGE UP (ref 3.5–5)
POTASSIUM SERPL-SCNC: 4 MMOL/L — SIGNIFICANT CHANGE UP (ref 3.5–5)
PROT SERPL-MCNC: 5.6 G/DL — LOW (ref 6–8)
RBC # BLD: 3.97 M/UL — LOW (ref 4.2–5.4)
RBC # FLD: 13.4 % — SIGNIFICANT CHANGE UP (ref 11.5–14.5)
SODIUM SERPL-SCNC: 143 MMOL/L — SIGNIFICANT CHANGE UP (ref 135–146)
WBC # BLD: 6.89 K/UL — SIGNIFICANT CHANGE UP (ref 4.8–10.8)
WBC # FLD AUTO: 6.89 K/UL — SIGNIFICANT CHANGE UP (ref 4.8–10.8)

## 2023-05-22 PROCEDURE — 93325 DOPPLER ECHO COLOR FLOW MAPG: CPT | Mod: 26

## 2023-05-22 PROCEDURE — 92960 CARDIOVERSION ELECTRIC EXT: CPT | Mod: XU

## 2023-05-22 PROCEDURE — 93312 ECHO TRANSESOPHAGEAL: CPT | Mod: 26,XU

## 2023-05-22 PROCEDURE — 93010 ELECTROCARDIOGRAM REPORT: CPT

## 2023-05-22 PROCEDURE — 99232 SBSQ HOSP IP/OBS MODERATE 35: CPT

## 2023-05-22 PROCEDURE — 93320 DOPPLER ECHO COMPLETE: CPT | Mod: 26

## 2023-05-22 RX ADMIN — PANTOPRAZOLE SODIUM 40 MILLIGRAM(S): 20 TABLET, DELAYED RELEASE ORAL at 06:06

## 2023-05-22 RX ADMIN — AMIODARONE HYDROCHLORIDE 400 MILLIGRAM(S): 400 TABLET ORAL at 22:20

## 2023-05-22 RX ADMIN — SERTRALINE 25 MILLIGRAM(S): 25 TABLET, FILM COATED ORAL at 13:39

## 2023-05-22 RX ADMIN — CHLORHEXIDINE GLUCONATE 1 APPLICATION(S): 213 SOLUTION TOPICAL at 06:06

## 2023-05-22 RX ADMIN — ENOXAPARIN SODIUM 60 MILLIGRAM(S): 100 INJECTION SUBCUTANEOUS at 05:26

## 2023-05-22 RX ADMIN — Medication 3 MILLIGRAM(S): at 22:20

## 2023-05-22 RX ADMIN — SENNA PLUS 2 TABLET(S): 8.6 TABLET ORAL at 22:20

## 2023-05-22 RX ADMIN — AMIODARONE HYDROCHLORIDE 400 MILLIGRAM(S): 400 TABLET ORAL at 05:25

## 2023-05-22 RX ADMIN — LISINOPRIL 5 MILLIGRAM(S): 2.5 TABLET ORAL at 05:25

## 2023-05-22 RX ADMIN — AMIODARONE HYDROCHLORIDE 400 MILLIGRAM(S): 400 TABLET ORAL at 13:39

## 2023-05-22 RX ADMIN — Medication 25 MILLIGRAM(S): at 13:39

## 2023-05-22 RX ADMIN — Medication 25 MILLIGRAM(S): at 23:52

## 2023-05-22 RX ADMIN — Medication 25 MILLIGRAM(S): at 05:25

## 2023-05-22 RX ADMIN — ENOXAPARIN SODIUM 60 MILLIGRAM(S): 100 INJECTION SUBCUTANEOUS at 17:53

## 2023-05-22 NOTE — PROGRESS NOTE ADULT - ASSESSMENT
76 yo F PMHx NSCLC stage 3 (s/p chemo, radiation,surgery 2018)   emphysema, severe MR s/p mitral valve annulopasty, paroxysmal AFib s/p coumadin s/p KOBY closure via suture presented for evaluation of dyspnea on exertion and cough which began two weeks prior to presentation. Upon ER arrival pt found to have afib with RVR and was admitted to telemetry. She was given IV cardizem and lovenox. She was also found to have GGO on CT chest and was treated for pneumonia. Shortly after admission pt developed AMS, hypotension, lactic acidosis, hypotension. She was intubated for airway protection, found to have decreased LVEF on bedside echo. She was started on dobutamine and levophed for suspected cardiogenic shock. She was also volume overloaded and was given Bumex infusion. For presumed PNA given Vancomycin, cefepime. x 2 weeks admitted to CCU for cardiogenic vs septic shock. Pt stabilized successfully extubated and downgraded on 5/18.    Cardiogenic/Septic shock   Suspected aspiration pna   Acute systolic heart failure  - pt received IV abx but was stopped after RVP was positive for rhino/enterovirus  - cultures neg, low procal  - cardiogenic shock due to acute HFrEF more likely than septic shock  - TTE 5/13 - diffuse hypokinesis of anterior wall, EF 25-30%  - cardiac cath not done--presumably acute low EF due to RVR?  - c/w metoprolol, lisinopril. Cardio follow up regarding start Farixga/Entresto  - pt currently not on lasix? she is euvolemic  - f/u cardiology if pt would benefit from ischemic work up/lifevest      pAfib with RVR s/p KOBY cloture  Severe MR s/p MV annuloplasty    - c/w therapeutic Lovenox for now as per cardiology  - pt planned for DCCV on Monday  - c/w metoprolol, amiodarone currently rate controlled      Transaminitis (resolved)  - RUQ US > Unremarkable right upper quadrant abdominal ultrasound  - likely due to shock    Vaginal bleeding vs hematuria  - witness by nursing staff two days ago when pt got up to ambulate  - currently no bleeding  - UA positive for hematuria-urology eval aprpeciated--oyutpt follow up  - no signs of GIB  - transvaginal US - Small fluid in the in endometrial canal, nonspecific.Uterine fibroid.      Anxiety   - c/w sertraline 25mg     Lung Ca  - s/p resection, chemo, radiation  - tx with Carboplatin/Alimta with Neulasta  - outpt oncology follow up  - in remission    DVT px-therapeutic lovenox    #Progress Note Handoff:  Pending (specify):  Cardioversion  Family discussion: I spoke with pt as above regarding DCCV  Disposition: Home_x__/SNF___/Other________/Unknown at this time________

## 2023-05-22 NOTE — PROGRESS NOTE ADULT - SUBJECTIVE AND OBJECTIVE BOX
CHIEF COMPLAINT:    Patient is a 77y old  Female who presents with a chief complaint of atrial fib     INTERVAL HPI/OVERNIGHT EVENTS:    Patient seen and examined at bedside. No acute overnight events occurred.    ROS: Denies SOB, chest pain. All other systems are negative.    Medications:  Standing  aMIOdarone    Tablet 400 milliGRAM(s) Oral every 8 hours  chlorhexidine 2% Cloths 1 Application(s) Topical <User Schedule>  enoxaparin Injectable 60 milliGRAM(s) SubCutaneous every 12 hours  ipratropium  for Nebulization. 500 MICROGram(s) Nebulizer once  ipratropium 17 MICROgram(s) HFA Inhaler 1 Puff(s) Inhalation once  lisinopril 5 milliGRAM(s) Oral daily  melatonin 3 milliGRAM(s) Oral at bedtime  metoprolol tartrate 25 milliGRAM(s) Oral every 6 hours  pantoprazole    Tablet 40 milliGRAM(s) Oral before breakfast  polyethylene glycol 3350 17 Gram(s) Oral daily  senna 2 Tablet(s) Oral at bedtime  sertraline 25 milliGRAM(s) Oral daily    PRN Meds        Vital Signs:    T(F): 96.2 (23 @ 13:46), Max: 97.6 (23 @ 19:08)  HR: 59 (23 @ 13:46) (59 - 82)  BP: 115/56 (23 @ 13:46) (104/56 - 130/58)  RR: 18 (23 @ 13:46) (18 - 18)  SpO2: 97% (23 @ 11:22) (97% - 98%)  I&O's Summary    21 May 2023 07:  -  22 May 2023 07:00  --------------------------------------------------------  IN: 506 mL / OUT: 0 mL / NET: 506 mL    22 May 2023 07:  -  22 May 2023 15:47  --------------------------------------------------------  IN: 120 mL / OUT: 0 mL / NET: 120 mL      Daily Height in cm: 157.48 (22 May 2023 11:22)    Daily Weight in k (22 May 2023 05:14)  CAPILLARY BLOOD GLUCOSE          PHYSICAL EXAM:  GENERAL:  NAD  SKIN: bruises arm arms and neck from lines  HEENT: Atraumatic. Normocephalic. Anicteric  NECK:  No JVD.   PULMONARY: Clear to ausculation bilaterally. No wheezing. No rales  CVS: Normal S1, S2. Regular rate and rhythm. No murmurs.  ABDOMEN/GI: Soft, Nontender, Nondistended; Bowel sounds are present  EXTREMITIES:  No edema B/L LE.  NEUROLOGIC:  No motor deficit.  PSYCH: Alert & oriented x 3, normal affect      LABS:                        12.1   6.89  )-----------( 260      ( 22 May 2023 06:12 )             37.1     05    143  |  106  |  23<H>  ----------------------------<  97  4.0   |  25  |  0.9    Ca    9.2      22 May 2023 06:12  Phos  3.1       Mg     1.9         TPro  5.6<L>  /  Alb  3.4<L>  /  TBili  0.5  /  DBili  x   /  AST  25  /  ALT  22  /  AlkPhos  65  05-22    PT/INR - ( 21 May 2023 18:04 )   PT: 12.10 sec;   INR: 1.06 ratio         PTT - ( 21 May 2023 18:04 )  PTT:35.7 sec          RADIOLOGY & ADDITIONAL TESTS:  Imaging or report Personally Reviewed:  [ ] YES  [ ] NO -->no new images    Telemetry reviewed independently - NSR, no acute events  EKG reviewed independently -->no new EKGs    Consultant(s) Notes Reviewed:  [ ] YES  [ ] NO  Care Discussed with Consultants/Other Providers [ ] YES  [ ] NO    Case discussed with resident  Care discussed with pt

## 2023-05-22 NOTE — PROGRESS NOTE ADULT - SUBJECTIVE AND OBJECTIVE BOX
AROLDO KO 77y Female  MRN#: [id number]   Hospital Day: 10d    SUBJECTIVE  Patient is a 77y old Female who presents with a chief complaint of atrial fib (21 May 2023 16:35)  Currently admitted to medicine with the primary diagnosis of Atrial fibrillation      INTERVAL HPI AND OVERNIGHT EVENTS:  Patient was examined and seen at bedside. This morning she is resting comfortably in bed. No issues or overnight events.    OBJECTIVE  PAST MEDICAL & SURGICAL HISTORY  HTN (hypertension)    Paroxysmal atrial fibrillation    Non-small cell lung cancer    H/O mitral valve replacement    Severe mitral valve regurgitation      ALLERGIES:  No Known Allergies    MEDICATIONS:  STANDING MEDICATIONS  aMIOdarone    Tablet 400 milliGRAM(s) Oral every 8 hours  chlorhexidine 2% Cloths 1 Application(s) Topical <User Schedule>  enoxaparin Injectable 60 milliGRAM(s) SubCutaneous every 12 hours  ipratropium  for Nebulization. 500 MICROGram(s) Nebulizer once  ipratropium 17 MICROgram(s) HFA Inhaler 1 Puff(s) Inhalation once  lisinopril 5 milliGRAM(s) Oral daily  melatonin 3 milliGRAM(s) Oral at bedtime  metoprolol tartrate 25 milliGRAM(s) Oral every 6 hours  pantoprazole    Tablet 40 milliGRAM(s) Oral before breakfast  polyethylene glycol 3350 17 Gram(s) Oral daily  senna 2 Tablet(s) Oral at bedtime  sertraline 25 milliGRAM(s) Oral daily    PRN MEDICATIONS      VITAL SIGNS: Last 24 Hours  T(C): 35.8 (22 May 2023 11:22), Max: 36.4 (21 May 2023 19:08)  T(F): 96.5 (22 May 2023 05:14), Max: 97.6 (21 May 2023 19:08)  HR: 75 (22 May 2023 11:22) (65 - 82)  BP: 124/58 (22 May 2023 11:22) (104/56 - 130/58)  BP(mean): 92 (22 May 2023 11:22) (92 - 92)  RR: 18 (22 May 2023 11:22) (18 - 18)  SpO2: 97% (22 May 2023 11:22) (97% - 98%)    LABS:                        12.1   6.89  )-----------( 260      ( 22 May 2023 06:12 )             37.1     05-22    143  |  106  |  23<H>  ----------------------------<  97  4.0   |  25  |  0.9    Ca    9.2      22 May 2023 06:12  Phos  3.1       Mg     1.9         TPro  5.6<L>  /  Alb  3.4<L>  /  TBili  0.5  /  DBili  x   /  AST  25  /  ALT  22  /  AlkPhos  65  22    PT/INR - ( 21 May 2023 18:04 )   PT: 12.10 sec;   INR: 1.06 ratio         PTT - ( 21 May 2023 18:04 )  PTT:35.7 sec  Urinalysis Basic - ( 21 May 2023 03:00 )    Color: Yellow / Appearance: Slightly Turbid / S.025 / pH: x  Gluc: x / Ketone: Trace  / Bili: Negative / Urobili: <2 mg/dL   Blood: x / Protein: 100 mg/dL / Nitrite: Negative   Leuk Esterase: Moderate / RBC: >720 /HPF / WBC 12 /HPF   Sq Epi: x / Non Sq Epi: x / Bacteria: Negative      Physical Exam:  CONSTITUTIONAL: No acute distress, alert and following commands  HEAD: Atraumatic, normocephalic  EYES: EOM intact, PERRLA, conjunctiva and sclera clear  PULMONARY: clear  CARDIOVASCULAR: Regular rate and rhythm  GASTROINTESTINAL: Soft, non-tender, non-distended; bowel sounds present  NEUROLOGY: non-focal

## 2023-05-22 NOTE — CHART NOTE - NSCHARTNOTEFT_GEN_A_CORE
POST OPERATIVE PROCEDURAL DOCUMENTATION  PRE-OP DIAGNOSIS:  AF      POST-OP DIAGNOSIS:  AF s/p successful CV    PROCEDURE: Transesophageal echocardiogram    Primary Physician:  Dr. Driscoll  Assistant: Dr. Csaillas    ANESTHESIA TYPE  [  ] General Anesthesia  [ x ] Conscious Sedation  [  ] Local/Regional    CONDITION  [  ] Critical  [  ] Serious  [  ] Fair  [ x ] Good    SPECIMENS REMOVED (IF APPLICABLE): N/A    IMPLANTS (IF APPLICABLE): None    ESTIMATED BLOOD LOSS: None    COMPLICATIONS: None      FINDINGS:    After risks and benefits of procedures were explained, informed consent was obtained and placed in chart. Refer to Anesthesia note for sedation details.  The AMANDEEP probe was passed into the esophagus without difficulty.  Transesophageal and transgastric images were obtained.  The AMANDEEP probe was removed without difficulty and examined.  There was no evidence for bleeding.  The patient tolerated the procedure well without any immediate AMANDEEP-related complications.      Preliminary Findings:        Summary:    Left ventricular ejection fraction, by visual estimation, is 25 to 30%.   Severely decreased global left ventricular systolic function.   Severely enlarged left atrium.   Mildly enlarged right ventricle.   KOBY was sutured closed in 2 layers in 2017. No Flow seen into KOBY in today's study.   Mildly enlarged right atrium.   Mild mitral valve regurgitation.   Mitral ring prosthesis seen in mitral position. No ines-device leaks.   Mild-moderate tricuspid regurgitation.   Mild pulmonic valve regurgitation.   Pulmonary hypertension is present.   Post AMANDEEP, patient underwent successful cardioversion to normal sinus rhythm.     Patient successfully converted to sinus rhythm with synchronized  _200__ J of direct current cardioversion.     DIAGNOSIS/IMPRESSION:  AF s/p successful CV    PLAN OF CARE:  return to floor  continue anticoagulation  f/u with cardiologist POST OPERATIVE PROCEDURAL DOCUMENTATION    PRE-OP DIAGNOSIS:  AF    POST-OP DIAGNOSIS:  AF s/p successful CV    PROCEDURE: Transesophageal echocardiogram and DC cardioversion    Primary Physician:  Dr. Driscoll  Assistant: Dr. Casillas    ANESTHESIA TYPE  [  ] General Anesthesia  [ x ] Conscious Sedation  [  ] Local/Regional    CONDITION  [  ] Critical  [  ] Serious  [  ] Fair  [ x ] Good    SPECIMENS REMOVED (IF APPLICABLE): N/A    IMPLANTS (IF APPLICABLE): None    ESTIMATED BLOOD LOSS: None    COMPLICATIONS: None      FINDINGS:    After risks and benefits of procedures were explained, informed consent was obtained and placed in chart. Refer to Anesthesia note for sedation details.  The AMANDEEP probe was passed into the esophagus without difficulty.  Transesophageal and transgastric images were obtained.  The AMANDEEP probe was removed without difficulty and examined.  There was no evidence for bleeding.  The patient tolerated the procedure well without any immediate AMANDEEP-related complications.      Preliminary Findings:    Summary:    Left ventricular ejection fraction, by visual estimation, is 25 to 30%.   Severely decreased global left ventricular systolic function.   Severely enlarged left atrium.   KOBY was sutured closed in 2 layers in 2017. No flow seen into KOBY in today's study.   Mildly enlarged right atrium.   Mild mitral valve regurgitation.   Mitral ring prosthesis seen in mitral position. No ines-device leaks.   Mild-moderate tricuspid regurgitation.   Mild pulmonic valve regurgitation.   Pulmonary hypertension is present.      Patient successfully converted to sinus rhythm with synchronized_200__ J.     Full report to follow.

## 2023-05-22 NOTE — PROGRESS NOTE ADULT - ASSESSMENT
77y F pmh NSCLC stage 3 s/p CRT/surgery 2018 (followed by Dr. Minda Lopez MD since 2017, was originally given chemotherapy with Carboplatin/Alimta with Neulasta), emphysema, severe MR s/p MVR, pAFib s/p coumadin s/p MVR and KOBY closure, past smoker presenting with dyspnea on exertion and cough x 2 weeks admitted to CCU for cardiogenic vs septic shock.     #Cardiogenic/Septic shock   #Suspected aspiration pna   #pAfib RVR  #Severe MR s/p MVR and KOBY closure   - s/p cardizem and lopressor in ER for Afib RVR, rapid response and upgrade to CCU  for AMS and hypotension >> downgrade to tele on   - TTE  - diffuse hypokinesis of anterior wall, EF 25-30%  - s/p intubated for airway protection in CCU, s/p Levophed ggt. s/p amio ggt  - CTA chest - no PE, b/l pleural effusions and ggo consistent with pulmonary edema  - CTH - no acute intracranial pathology    - TSH 7.26   - s/p abx for possible PNA, dc on  as per ID, cultures neg, low procal  - stable for downgrade on     #Hematuria and Vaginal bleeding  - noted to have one episode vaginal bleeding on  >> no bleeding during exam on   - RN received signout from CCU RN that pt have fistula  - OBGYN consult appreciated >> no vaginal bleeding or fistula noted  - hematuria on  >>urology consulted >> no intervention    #Afib  - EP rec AMANDEEP/Cardioversion  - Cardioversion on  >> NSR  - DC planning    #Transaminitis (resolved)  - RUQ US > Unremarkable right upper quadrant abdominal ultrasound    #Anxiety   - c/w sertraline 25mg     # Misc  - DVT Prophylaxis: prophylactic dose  - GI Prophylaxis: pantoprazole  Injectable 40 milliGRAM(s)   - Diet: DASH  - Activity: IAT  - Code Status: Full    Pendin) prepare for dc

## 2023-05-23 ENCOUNTER — TRANSCRIPTION ENCOUNTER (OUTPATIENT)
Age: 78
End: 2023-05-23

## 2023-05-23 VITALS
SYSTOLIC BLOOD PRESSURE: 108 MMHG | HEART RATE: 67 BPM | DIASTOLIC BLOOD PRESSURE: 52 MMHG | TEMPERATURE: 96 F | RESPIRATION RATE: 18 BRPM

## 2023-05-23 LAB
ALBUMIN SERPL ELPH-MCNC: 3.5 G/DL — SIGNIFICANT CHANGE UP (ref 3.5–5.2)
ALP SERPL-CCNC: 66 U/L — SIGNIFICANT CHANGE UP (ref 30–115)
ALT FLD-CCNC: 22 U/L — SIGNIFICANT CHANGE UP (ref 0–41)
ANION GAP SERPL CALC-SCNC: 11 MMOL/L — SIGNIFICANT CHANGE UP (ref 7–14)
AST SERPL-CCNC: 24 U/L — SIGNIFICANT CHANGE UP (ref 0–41)
BASOPHILS # BLD AUTO: 0.05 K/UL — SIGNIFICANT CHANGE UP (ref 0–0.2)
BASOPHILS NFR BLD AUTO: 0.7 % — SIGNIFICANT CHANGE UP (ref 0–1)
BILIRUB SERPL-MCNC: 0.7 MG/DL — SIGNIFICANT CHANGE UP (ref 0.2–1.2)
BUN SERPL-MCNC: 20 MG/DL — SIGNIFICANT CHANGE UP (ref 10–20)
CALCIUM SERPL-MCNC: 9.6 MG/DL — SIGNIFICANT CHANGE UP (ref 8.4–10.4)
CHLORIDE SERPL-SCNC: 106 MMOL/L — SIGNIFICANT CHANGE UP (ref 98–110)
CO2 SERPL-SCNC: 26 MMOL/L — SIGNIFICANT CHANGE UP (ref 17–32)
CREAT SERPL-MCNC: 0.9 MG/DL — SIGNIFICANT CHANGE UP (ref 0.7–1.5)
CULTURE RESULTS: SIGNIFICANT CHANGE UP
EGFR: 65 ML/MIN/1.73M2 — SIGNIFICANT CHANGE UP
EOSINOPHIL # BLD AUTO: 0.08 K/UL — SIGNIFICANT CHANGE UP (ref 0–0.7)
EOSINOPHIL NFR BLD AUTO: 1.2 % — SIGNIFICANT CHANGE UP (ref 0–8)
GLUCOSE SERPL-MCNC: 106 MG/DL — HIGH (ref 70–99)
HCT VFR BLD CALC: 36.6 % — LOW (ref 37–47)
HGB BLD-MCNC: 11.8 G/DL — LOW (ref 12–16)
IMM GRANULOCYTES NFR BLD AUTO: 0.6 % — HIGH (ref 0.1–0.3)
LYMPHOCYTES # BLD AUTO: 0.67 K/UL — LOW (ref 1.2–3.4)
LYMPHOCYTES # BLD AUTO: 9.9 % — LOW (ref 20.5–51.1)
MAGNESIUM SERPL-MCNC: 1.6 MG/DL — LOW (ref 1.8–2.4)
MCHC RBC-ENTMCNC: 30.3 PG — SIGNIFICANT CHANGE UP (ref 27–31)
MCHC RBC-ENTMCNC: 32.2 G/DL — SIGNIFICANT CHANGE UP (ref 32–37)
MCV RBC AUTO: 93.8 FL — SIGNIFICANT CHANGE UP (ref 81–99)
MONOCYTES # BLD AUTO: 0.52 K/UL — SIGNIFICANT CHANGE UP (ref 0.1–0.6)
MONOCYTES NFR BLD AUTO: 7.7 % — SIGNIFICANT CHANGE UP (ref 1.7–9.3)
NEUTROPHILS # BLD AUTO: 5.39 K/UL — SIGNIFICANT CHANGE UP (ref 1.4–6.5)
NEUTROPHILS NFR BLD AUTO: 79.9 % — HIGH (ref 42.2–75.2)
NRBC # BLD: 0 /100 WBCS — SIGNIFICANT CHANGE UP (ref 0–0)
PLATELET # BLD AUTO: 268 K/UL — SIGNIFICANT CHANGE UP (ref 130–400)
PMV BLD: 9.2 FL — SIGNIFICANT CHANGE UP (ref 7.4–10.4)
POTASSIUM SERPL-MCNC: 4.1 MMOL/L — SIGNIFICANT CHANGE UP (ref 3.5–5)
POTASSIUM SERPL-SCNC: 4.1 MMOL/L — SIGNIFICANT CHANGE UP (ref 3.5–5)
PROT SERPL-MCNC: 6 G/DL — SIGNIFICANT CHANGE UP (ref 6–8)
RBC # BLD: 3.9 M/UL — LOW (ref 4.2–5.4)
RBC # FLD: 13.7 % — SIGNIFICANT CHANGE UP (ref 11.5–14.5)
SODIUM SERPL-SCNC: 143 MMOL/L — SIGNIFICANT CHANGE UP (ref 135–146)
SPECIMEN SOURCE: SIGNIFICANT CHANGE UP
WBC # BLD: 6.75 K/UL — SIGNIFICANT CHANGE UP (ref 4.8–10.8)
WBC # FLD AUTO: 6.75 K/UL — SIGNIFICANT CHANGE UP (ref 4.8–10.8)

## 2023-05-23 PROCEDURE — 99238 HOSP IP/OBS DSCHRG MGMT 30/<: CPT

## 2023-05-23 PROCEDURE — 99232 SBSQ HOSP IP/OBS MODERATE 35: CPT

## 2023-05-23 RX ORDER — MAGNESIUM SULFATE 500 MG/ML
2 VIAL (ML) INJECTION ONCE
Refills: 0 | Status: COMPLETED | OUTPATIENT
Start: 2023-05-23 | End: 2023-05-23

## 2023-05-23 RX ORDER — METOPROLOL TARTRATE 50 MG
1 TABLET ORAL
Qty: 30 | Refills: 0
Start: 2023-05-23 | End: 2023-06-21

## 2023-05-23 RX ORDER — APIXABAN 2.5 MG/1
1 TABLET, FILM COATED ORAL
Qty: 60 | Refills: 0
Start: 2023-05-23 | End: 2023-06-21

## 2023-05-23 RX ORDER — LISINOPRIL 2.5 MG/1
1 TABLET ORAL
Qty: 30 | Refills: 0
Start: 2023-05-23 | End: 2023-06-21

## 2023-05-23 RX ORDER — MAGNESIUM OXIDE 400 MG ORAL TABLET 241.3 MG
400 TABLET ORAL ONCE
Refills: 0 | Status: COMPLETED | OUTPATIENT
Start: 2023-05-23 | End: 2023-05-23

## 2023-05-23 RX ORDER — METOPROLOL TARTRATE 50 MG
1 TABLET ORAL
Refills: 0 | DISCHARGE

## 2023-05-23 RX ADMIN — SERTRALINE 25 MILLIGRAM(S): 25 TABLET, FILM COATED ORAL at 11:09

## 2023-05-23 RX ADMIN — AMIODARONE HYDROCHLORIDE 400 MILLIGRAM(S): 400 TABLET ORAL at 05:51

## 2023-05-23 RX ADMIN — Medication 25 MILLIGRAM(S): at 11:09

## 2023-05-23 RX ADMIN — POLYETHYLENE GLYCOL 3350 17 GRAM(S): 17 POWDER, FOR SOLUTION ORAL at 11:09

## 2023-05-23 RX ADMIN — Medication 25 MILLIGRAM(S): at 05:51

## 2023-05-23 RX ADMIN — LISINOPRIL 5 MILLIGRAM(S): 2.5 TABLET ORAL at 05:51

## 2023-05-23 RX ADMIN — MAGNESIUM OXIDE 400 MG ORAL TABLET 400 MILLIGRAM(S): 241.3 TABLET ORAL at 11:08

## 2023-05-23 RX ADMIN — ENOXAPARIN SODIUM 60 MILLIGRAM(S): 100 INJECTION SUBCUTANEOUS at 05:52

## 2023-05-23 RX ADMIN — PANTOPRAZOLE SODIUM 40 MILLIGRAM(S): 20 TABLET, DELAYED RELEASE ORAL at 05:51

## 2023-05-23 RX ADMIN — Medication 25 GRAM(S): at 11:08

## 2023-05-23 RX ADMIN — CHLORHEXIDINE GLUCONATE 1 APPLICATION(S): 213 SOLUTION TOPICAL at 05:50

## 2023-05-23 NOTE — PROGRESS NOTE ADULT - ASSESSMENT
Pt is a 76yo Female with PMH/PSH including NSCLC s/p resection & XRT, emphysema, AFib who presented to the hospital on 5/12 w/ worsening SOB & cough x 2 weeks, admitted with cardiogenic shock, AFib w/ RVR.    consulted for hematuria -  spontaneously resolved.         Plan:  No acute  intervention needed at this time   Hematuria work-up as an outpatient ( CT A/P, Urine Cytology)  Will need F/U with Dr. Lindsey as an outpatient.   Will d/w Dr. Lindsey.

## 2023-05-23 NOTE — PROGRESS NOTE ADULT - ASSESSMENT
Impression   # A fib with RVR and cardiogenic shock now resolved   S/p AMANDEEP and DCCV now in sinus   # HFrEF - euvolemic   Normal cath in 2017    Recommendations   - Continue current cardiac medications   - No further ischemic workup needed   - No indication for life vest   - Repeat TTE in 3 months     Discussed with attending.   Signature: Jania VILLARREAL, 1st year Cardiology Fellow     Impression   # Afib with RVR and cardiogenic shock - resolved   s/p AMANDEEP/DCCV on 5/22 - currently in SR  # HFrEF - euvolemic     Cath in 2017 - Normal coronaries    Recommendations   - Continue current cardiac medications   - No further ischemic workup needed   - No indication for life vest   - GDMT for HF  - Repeat TTE in 3 months     Discussed with attending.   Signature: Jania VILLARREAL, 1st year Cardiology Fellow

## 2023-05-23 NOTE — DISCHARGE NOTE PROVIDER - PROVIDER TOKENS
PROVIDER:[TOKEN:[[id number]]],PROVIDER:[TOKEN:[[id number]]] PROVIDER:[TOKEN:[[id number]]],PROVIDER:[TOKEN:[[id number]]],PROVIDER:[TOKEN:[[id number]]]

## 2023-05-23 NOTE — CHART NOTE - NSCHARTNOTEFT_GEN_A_CORE
Called son Obed ([phone]) but he was picking up the phone so left voice mail that pt needs to f/u with cardiologist in 2 weeks and discuss about anticoagulant use, and that she should stop taking Eliquis in 30-40 days after cardioversion given she had KOBY closure in the past. I have Informed the pt as well today. Called son Obed ([phone]) but he was not picking up the phone so left voice mail that pt needs to f/u with cardiologist in 2 weeks and discuss about anticoagulant use, and that she should stop taking Eliquis in 30-40 days after cardioversion given she had KOBY closure in the past. I have Informed the pt as well today.

## 2023-05-23 NOTE — DISCHARGE NOTE PROVIDER - NSDCPNSUBOBJ_GEN_ALL_CORE
Pt seen and examined at bedside. Pt feels well. case d/w cardiologist. recommended amiodarone, metoprolol, lisinopril, eliquis for one month. Stable for dc home.

## 2023-05-23 NOTE — DISCHARGE NOTE PROVIDER - NSDCMRMEDTOKEN_GEN_ALL_CORE_FT
metoprolol succinate 25 mg oral tablet, extended release: 1 tab(s) orally once a day  sertraline 25 mg oral tablet: 1 tab(s) orally once a day   Eliquis 5 mg oral tablet: 1 tab(s) orally 2 times a day  metoprolol succinate 25 mg oral tablet, extended release: 1 tab(s) orally once a day  sertraline 25 mg oral tablet: 1 tab(s) orally once a day   amiodarone 200 mg oral tablet: 1 tab(s) orally once a day  Eliquis 5 mg oral tablet: 1 tab(s) orally 2 times a day  metoprolol succinate 100 mg oral tablet, extended release: 1 tab(s) orally once a day  sertraline 25 mg oral tablet: 1 tab(s) orally once a day   amiodarone 200 mg oral tablet: 1 tab(s) orally once a day  Eliquis 5 mg oral tablet: 1 tab(s) orally 2 times a day  lisinopril 5 mg oral tablet: 1 tab(s) orally once a day  metoprolol succinate 100 mg oral tablet, extended release: 1 tab(s) orally once a day  sertraline 25 mg oral tablet: 1 tab(s) orally once a day

## 2023-05-23 NOTE — PROGRESS NOTE ADULT - SUBJECTIVE AND OBJECTIVE BOX
Outpt cardiologist:    Reason for Consult:     HISTORY OF PRESENT ILLNESS:  78yo F hx of NSCLC stage 3 s/p CRT and surgery 2018, emphysema, severe mitral valve regurgitation s/p MVR, paroxysmal atrial fibrillation previously on coumadin but stopped after MVR and KOBY closure, former smoker presenting with progressive shortness of breath on exertion, and intermittent dry cough lasting for past 2 weeks. Denies chest pain, palpitations, headaches, wheezing, abdominal pain, fevers, sick contacts, nausea/vomiting. Was prescribed meds from PMD without symptomatic relief. Presented to the ED from home.    At the ED, VS- T: 36.7C, BP: 90/70, HR: 160s, SpO2: 99% on RA, RR: 16. Labs significant for BNP- 6227, and elevated AST/ALT (54/60). Troponin<0.01 x1. EKG showed atrial fibrillation with RVR. CT Chest NC showed unchanged RLL GGO.     Admitted to telemetry for afib with RVR. Given 60mg x1 therapeutic lovenox. 1L LR bolus. For afib with  RVR, was given 20mg IVP diltiazem and 60mg PO. HR improved to 94.  (12 May 2023 23:20)      Cardiology Fellow Notes    Previous Cardiac Workup    Risk Factors:      PAST MEDICAL & SURGICAL HISTORY  HTN (hypertension)    Paroxysmal atrial fibrillation    Non-small cell lung cancer    H/O mitral valve replacement    Severe mitral valve regurgitation        FAMILY HISTORY:  FAMILY HISTORY:      SOCIAL HISTORY:  Social History:  former smoker (12 May 2023 23:20)      ALLERGIES:  No Known Allergies      MEDICATIONS:  aMIOdarone    Tablet 400 milliGRAM(s) Oral every 8 hours  chlorhexidine 2% Cloths 1 Application(s) Topical <User Schedule>  enoxaparin Injectable 60 milliGRAM(s) SubCutaneous every 12 hours  ipratropium  for Nebulization. 500 MICROGram(s) Nebulizer once  ipratropium 17 MICROgram(s) HFA Inhaler 1 Puff(s) Inhalation once  lisinopril 5 milliGRAM(s) Oral daily  melatonin 3 milliGRAM(s) Oral at bedtime  metoprolol tartrate 25 milliGRAM(s) Oral every 6 hours  pantoprazole    Tablet 40 milliGRAM(s) Oral before breakfast  polyethylene glycol 3350 17 Gram(s) Oral daily  senna 2 Tablet(s) Oral at bedtime  sertraline 25 milliGRAM(s) Oral daily    PRN:      HOME MEDICATIONS:  Home Medications:  sertraline 25 mg oral tablet: 1 tab(s) orally once a day (13 May 2023 00:17)      VITALS:   T(F): 96.3 ( @ 13:29), Max: 97.6 ( @ 05:42)  HR: 67 ( @ 13:29) (58 - 98)  BP: 108/52 ( @ 13:29) (93/45 - 143/65)  BP(mean): 92 ( @ 11:22) (92 - 92)  RR: 18 ( @ 13:29) (18 - 18)  SpO2: 97% ( @ 11:22) (97% - 98%)    I&O's Summary    22 May 2023 07:  -  23 May 2023 07:00  --------------------------------------------------------  IN: 280 mL / OUT: 0 mL / NET: 280 mL    23 May 2023 07:01  -  23 May 2023 14:11  --------------------------------------------------------  IN: 210 mL / OUT: 0 mL / NET: 210 mL        REVIEW OF SYSTEMS:  CONSTITUTIONAL: No weakness, fevers or chills  HEENT: No visual changes, neck/ear pain  RESPIRATORY: No cough, sob  CARDIOVASCULAR: See HPI  GASTROINTESTINAL: No abdominal pain. No nausea, vomiting, diarrhea   GENITOURINARY: No dysuria, frequency or hematuria  NEUROLOGICAL: No new focal deficits  SKIN: No new rashes    PHYSICAL EXAM:  General: Not in distress.  Non-toxic appearing.   HEENT: EOMI  Cardio: regular, S1, S2, no murmur  Pulm: B/L BS.  No wheezing / crackles / rales  Abdomen: Soft, non-tender, non-distended. Normoactive bowel sounds  Extremities: No edema b/l le  Neuro: A&O x3. No focal deficits    LABS:                        11.8   6.75  )-----------( 268      ( 23 May 2023 06:46 )             36.6         143  |  106  |  20  ----------------------------<  106<H>  4.1   |  26  |  0.9    Ca    9.6      23 May 2023 06:46  Phos  3.1       Mg     1.6         TPro  6.0  /  Alb  3.5  /  TBili  0.7  /  DBili  x   /  AST  24  /  ALT  22  /  AlkPhos  66      PT/INR - ( 21 May 2023 18:04 )   PT: 12.10 sec;   INR: 1.06 ratio         PTT - ( 21 May 2023 18:04 )  PTT:35.7 sec          Troponin trend:        SARS-CoV-2: NotDetec (15 May 2023 11:55)  COVID-19 PCR: NotDetec (12 May 2023 23:21)      IMAGING:  Summary:    Left ventricular ejection fraction, by visual estimation, is 25 to 30%.   Severely decreased global left ventricular systolic function.   Severely enlarged left atrium.   KOBY was sutured closed in 2 layers in 2017. No flow seen into KOBY in today's study.   Mildly enlarged right atrium.   Mild mitral valve regurgitation.   Mitral ring prosthesis seen in mitral position. No ines-device leaks.   Mild-moderate tricuspid regurgitation.   Mild pulmonic valve regurgitation.   Pulmonary hypertension is present.    -CATHETERIZATION:  Cardiac cath in 2017 with normal coronaries     EC Lead ECG:   Ventricular Rate 53 BPM    Atrial Rate 53 BPM    P-R Interval 196 ms    QRS Duration 133 ms    Q-T Interval 454 ms    QTC Calculation(Bazett) 427 ms    P Axis 75 degrees    R Axis -86 degrees    T Axis 164 degrees    Diagnosis Line Sinus bradycardia  Left axis deviation  Left bundle branch block  Abnormal ECG    Confirmed by LOYD MARADIAGA MD ([id number]) on 2023 3:09:16 PM ( @ 12:07)      TELEMETRY EVENTS:   HISTORY OF PRESENT ILLNESS:  76yo F hx of NSCLC stage 3 s/p CRT and surgery 2018, emphysema, severe mitral valve regurgitation s/p MVR, paroxysmal atrial fibrillation previously on coumadin but stopped after MVR and KOBY closure, former smoker presenting with progressive shortness of breath on exertion, and intermittent dry cough lasting for past 2 weeks. Denies chest pain, palpitations, headaches, wheezing, abdominal pain, fevers, sick contacts, nausea/vomiting. Was prescribed meds from PMD without symptomatic relief. Presented to the ED from home.    At the ED, VS- T: 36.7C, BP: 90/70, HR: 160s, SpO2: 99% on RA, RR: 16. Labs significant for BNP- 6227, and elevated AST/ALT (54/60). Troponin<0.01 x1. EKG showed atrial fibrillation with RVR. CT Chest NC showed unchanged RLL GGO.     Admitted to telemetry for afib with RVR. Given 60mg x1 therapeutic lovenox. 1L LR bolus. For afib with  RVR, was given 20mg IVP diltiazem and 60mg PO. HR improved to 94.  (12 May 2023 23:20)      PAST MEDICAL & SURGICAL HISTORY  HTN (hypertension)    Paroxysmal atrial fibrillation    Non-small cell lung cancer    H/O mitral valve replacement    Severe mitral valve regurgitation      MEDICATIONS:  aMIOdarone    Tablet 400 milliGRAM(s) Oral every 8 hours  chlorhexidine 2% Cloths 1 Application(s) Topical <User Schedule>  enoxaparin Injectable 60 milliGRAM(s) SubCutaneous every 12 hours  ipratropium  for Nebulization. 500 MICROGram(s) Nebulizer once  ipratropium 17 MICROgram(s) HFA Inhaler 1 Puff(s) Inhalation once  lisinopril 5 milliGRAM(s) Oral daily  melatonin 3 milliGRAM(s) Oral at bedtime  metoprolol tartrate 25 milliGRAM(s) Oral every 6 hours  pantoprazole    Tablet 40 milliGRAM(s) Oral before breakfast  polyethylene glycol 3350 17 Gram(s) Oral daily  senna 2 Tablet(s) Oral at bedtime  sertraline 25 milliGRAM(s) Oral daily      VITALS:   T(F): 96.3 ( @ 13:29), Max: 97.6 (05 @ 05:42)  HR: 67 ( @ 13:29) (58 - 98)  BP: 108/52 ( @ 13:29) (93/45 - 143/65)  BP(mean): 92 ( @ 11:22) (92 - 92)  RR: 18 ( @ 13:29) (18 - 18)  SpO2: 97% ( @ 11:22) (97% - 98%)    I&O's Summary    22 May 2023 07:  -  23 May 2023 07:00  --------------------------------------------------------  IN: 280 mL / OUT: 0 mL / NET: 280 mL    23 May 2023 07:  -  23 May 2023 14:11  --------------------------------------------------------  IN: 210 mL / OUT: 0 mL / NET: 210 mL        REVIEW OF SYSTEMS:  CONSTITUTIONAL: No fever, weight loss, fatigue  NECK: No pain or stiffness  RESPIRATORY: See HPI  CARDIOVASCULAR: See HPI  GASTROINTESTINAL: No abdominal/epigastric pain, nausea, vomiting, hematemesis, diarrhea, constipation, melena or hematochezia  GENITOURINARY: No dysuria, frequency, hematuria, incontinence  NEUROLOGICAL: No headaches, memory loss, loss of strength, numbness, tremors  SKIN: No itching, burning, rashes, lesions   ENDOCRINE: No heat/cold intolerance or hair loss  MUSCULOSKELETAL: No joint pain or swelling  HEME/LYMPH: No easy bruising or bleeding gums    PHYSICAL EXAM:  General: Not in distress.  HEENT: EOMI  Cardio: regular, S1, S2, no murmur  Pulm: B/L BS.  No wheezing / crackles / rales  Abdomen: Soft, non-tender, non-distended.  Extremities: No edema b/l le  Neuro: A&O x3      LABS:                        11.8   6.75  )-----------( 268      ( 23 May 2023 06:46 )             36.6     05-23    143  |  106  |  20  ----------------------------<  106<H>  4.1   |  26  |  0.9    Ca    9.6      23 May 2023 06:46  Phos  3.1       Mg     1.6         TPro  6.0  /  Alb  3.5  /  TBili  0.7  /  DBili  x   /  AST  24  /  ALT  22  /  AlkPhos  66          < from: Transesophageal Echocardiogram (23 @ 11:48) >  Summary:   1. Successful DCCV to SR.   2. Left ventricular ejection fraction, by visual estimation, is 25 to   30%.   3. Severely decreased global left ventricular systolic function.   4. Report history of surgical ligation of the left atrial appendage. No   evidence ofresidual flow.   5. Severely enlarged left atrium.   6. Myxomatous mitral valve.   7. Status-post mitral annular ring insertion.   8. Mild valvular mitral regurgitation.   9. Mild-to-moderate tricuspid regurgitation.  10. Estimated pulmonary artery systolic pressure is 44.5 mmHg assuming a   right atrial pressure of 3 mmHg, which is consistent with mild pulmonary   hypertension.    < end of copied text >        CATHETERIZATION:  Cardiac cath in 2017 with normal coronaries       EC Lead ECG:   Ventricular Rate 53 BPM    Atrial Rate 53 BPM    P-R Interval 196 ms    QRS Duration 133 ms    Q-T Interval 454 ms    QTC Calculation(Bazett) 427 ms    P Axis 75 degrees    R Axis -86 degrees    T Axis 164 degrees    Diagnosis Line Sinus bradycardia  Left axis deviation  Left bundle branch block  Abnormal ECG

## 2023-05-23 NOTE — PROGRESS NOTE ADULT - PROVIDER SPECIALTY LIST ADULT
CCU
CCU
Infectious Disease
Internal Medicine
CCU
CCU
Cardiology
Critical Care
Internal Medicine
Pulmonology
CCU
Internal Medicine
Urology
CCU

## 2023-05-23 NOTE — DISCHARGE NOTE PROVIDER - NSDCCPCAREPLAN_GEN_ALL_CORE_FT
PRINCIPAL DISCHARGE DIAGNOSIS  Diagnosis: Cardiogenic shock  Assessment and Plan of Treatment: You were admitted for cardiogenic shock in ICU. You were intubated and were on pressors in the ICU which was then wean off. You were also treated with antibiotics. You also had irregular rhythm called Afib, you received procedure called cardioversion and your HR return back to normal. You will need to take blood thinners and other meds. Please take all meds as prescribed. follow up with cardiologist in 1 week.     PRINCIPAL DISCHARGE DIAGNOSIS  Diagnosis: Cardiogenic shock  Assessment and Plan of Treatment: You were admitted for cardiogenic shock in ICU. You were intubated and were on pressors in the ICU which was then wean off. You were also treated with antibiotics. You also had irregular rhythm called Afib, you received procedure called cardioversion and your HR return back to normal. You will need to take blood thinners and other meds. Please take all meds as prescribed. follow up with cardiologist in 1 week.  you also had blood in urine, urology was consulted and they asked to do CT scan of abdomen and pelvis outpaitent and follow up with urologist.     PRINCIPAL DISCHARGE DIAGNOSIS  Diagnosis: Cardiogenic shock  Assessment and Plan of Treatment: You were admitted for cardiogenic shock in ICU. You were intubated and were on pressors in the ICU which was then wean off. You were also treated with antibiotics. You also had irregular rhythm called Afib, you received procedure called cardioversion and your HR return back to normal. You will need to take blood thinners and other meds. Please take all meds as prescribed. follow up with cardiologist in 2 week and talk about blood thinners, it usually needs to be continued for 30-40 days after cardioversion and then you can stop it.  you also had blood in urine, urology was consulted and they asked to do CT scan of abdomen and pelvis outpaitent and follow up with urologist.

## 2023-05-23 NOTE — DISCHARGE NOTE PROVIDER - NSDCFUSCHEDAPPT_GEN_ALL_CORE_FT
[facility]  [facility] [address]  Scheduled Appointment: 06/19/2023

## 2023-05-23 NOTE — PROGRESS NOTE ADULT - SUBJECTIVE AND OBJECTIVE BOX
UROLOGY DAILY PROGRESS NOTE  Pt. seen and examined at bedside, reports hematuria resolved, denies difficulty on urination. Afebrile.  Awaiting d/c home.     MEDICATIONS  (STANDING):  aMIOdarone    Tablet 400 milliGRAM(s) Oral every 8 hours  chlorhexidine 2% Cloths 1 Application(s) Topical <User Schedule>  enoxaparin Injectable 60 milliGRAM(s) SubCutaneous every 12 hours  ipratropium  for Nebulization. 500 MICROGram(s) Nebulizer once  ipratropium 17 MICROgram(s) HFA Inhaler 1 Puff(s) Inhalation once  lisinopril 5 milliGRAM(s) Oral daily  melatonin 3 milliGRAM(s) Oral at bedtime  metoprolol tartrate 25 milliGRAM(s) Oral every 6 hours  pantoprazole    Tablet 40 milliGRAM(s) Oral before breakfast  polyethylene glycol 3350 17 Gram(s) Oral daily  senna 2 Tablet(s) Oral at bedtime  sertraline 25 milliGRAM(s) Oral daily    MEDICATIONS  (PRN):      REVIEW OF SYSTEMS   [x] A ten-point review of systems was otherwise negative except as noted.     Vital Signs Last 24 Hrs  T(C): 35.7 (23 May 2023 05:00), Max: 36 (22 May 2023 21:00)  T(F): 96.2 (23 May 2023 05:00), Max: 96.8 (22 May 2023 21:00)  HR: 62 (23 May 2023 05:00) (58 - 73)  BP: 142/66 (23 May 2023 05:00) (101/51 - 143/65)  RR: 18 (22 May 2023 21:00) (18 - 18)           PHYSICAL EXAM:  GEN: NAD, awake and alert.  SKIN: Good color, non diaphoretic.  ABDO: Soft, NT/ND, no palpable bladder, no suprapubic tenderness.   BACK: No CVAT B/L  EXT: KURTZ x 4       I&O's Detail    22 May 2023 07:01  -  23 May 2023 07:00  --------------------------------------------------------  IN:    Oral Fluid: 280 mL  Total IN: 280 mL    OUT:  Total OUT: 0 mL    Total NET: 280 mL      23 May 2023 07:01  -  23 May 2023 12:31  --------------------------------------------------------  IN:    Oral Fluid: 210 mL  Total IN: 210 mL    OUT:  Total OUT: 0 mL    Total NET: 210 mL      LABS:                        11.8   6.75  )-----------( 268      ( 23 May 2023 06:46 )             36.6     05-23    143  |  106  |  20  ----------------------------<  106<H>  4.1   |  26  |  0.9    Ca    9.6      23 May 2023 06:46  Phos  3.1     05-22  Mg     1.6     05-23    TPro  6.0  /  Alb  3.5  /  TBili  0.7  /  DBili  x   /  AST  24  /  ALT  22  /  AlkPhos  66  05-23    PT/INR - ( 21 May 2023 18:04 )   PT: 12.10 sec;   INR: 1.06 ratio         PTT - ( 21 May 2023 18:04 )  PTT:35.7 sec    Urinalysis (05.21.23 @ 03:00)    Glucose Qualitative, Urine: Negative   Blood, Urine: Large   pH Urine: 6.5   Color: Yellow   Urine Appearance: Slightly Turbid   Bilirubin: Negative   Ketone - Urine: Trace   Specific Gravity: 1.025   Protein, Urine: 100 mg/dL   Urobilinogen: <2 mg/dL   Nitrite: Negative   Leukocyte Esterase Concentration: Moderate    Urine Microscopic-Add On (NC) (05.21.23 @ 03:00)   Red Blood Cell - Urine: >720 /HPF   White Blood Cell - Urine: 12 /HPF   Hyaline Casts: 2 /LPF   Bacteria: Negative   Squamous Epithelial Cells: 4 /HPF      Culture - Urine (05.21.23 @ 21:48)    Specimen Source: Clean Catch Clean Catch (Midstream)   Culture Results:   <10,000 CFU/mL Normal Urogenital Maki

## 2023-05-23 NOTE — PROGRESS NOTE ADULT - ATTENDING COMMENTS
In brief, Ms. Rogers is a 77-year-old woman with history of stage 3 NSCLC s/p CRT and surgery in 2018, emphysema, severe MR s/p bMVR, paroxysmal AF s/p KOBY closure during MVR presenting with rapid AF with subsequent decompensation after attempted rate control with BB and CCBs. She was intubated for acute hypoxic respiratory failure and subsequently admitted to CCU for cardiogenic shock.    She has since been maintained on dobutamine and levophed while undergoing aggressive diuresis.    TTE today with severe LV dysfunction with severe hypokinesis/akinesis of the anterior wall, moderately reduced systolic function, severe biatrial dilatation, normal mitral function s/p mitral annuloplasty, and a large pleural effusion.    Plan:  - Wean levophed to maintain MAP >65 or SBP >90  - Slowly wean off dobutamine, monitoring mixed venous sats.  - Continue aggressive diuresis.  - When extubated and hemodynamically stable consider ischemic evaluation.  - Can discontinue AC; patient is s/p surgical KOBY closure and has a bioprosthetic valve. Should be continued on aspirin.
Patient seen / examined and plan amended above
seen this afternoon    plan as above    more comfortable    EP f/u    cont. tele
Patient seen, case d/w CCU team on rounds.  Agree with assessment and plan.  Patient is A-fib, VR better controlled, BP stable.  Remains weak and lethargic. Needs rehab and nutritional evaluation.  Transfer to medical telemetry for further treatment.
In brief, Ms. Rogers is a 77-year-old woman with history of stage 3 NSCLC s/p CRT and surgery in 2018, emphysema, severe MR s/p bMVR, paroxysmal AF s/p KOBY closure during MVR presenting with rapid AF with subsequent decompensation after attempted rate control with BB and CCBs. She was intubated for acute hypoxic respiratory failure and subsequently admitted to CCU for cardiogenic shock.    She was maintained on dobutamine and levophed while undergoing aggressive diuresis, with course complicated by sepsis from presumed aspiration pneumonia. Dobutamine has been successfully weaned off and levophed requirements have improved. Today has been febrile throughout, though improved by end of the day.    TTE with severe LV dysfunction with severe hypokinesis/akinesis of the anterior wall, moderately reduced systolic function, severe biatrial dilatation, normal mitral function s/p mitral annuloplasty, and a large pleural effusion.    Plan:  - Wean levophed to maintain MAP >65 or SBP >90.  - Infectious workup, empiric antibiotics for aspiration pneumonia.  - Ongoing diuresis PRN; last IVC 1.8cm and non-collapsing. Can give a dose of Bumex this evening.  - Continue IV amiodarone; drop to 0.5mg/min  - When extubated and hemodynamically stable consider ischemic evaluation.  - No clear indication for AC; patient is s/p surgical KOBY closure and has a bioprosthetic valve. Should be continued on aspirin. Discontinue therapeutic dose Lovenox.
Patient seen repeatedly, case d/w CCU team on rounds.  Agree with assessment and plan.  A-fib, long history but unclear onset of current episode. A-flutter with 2:1 block today. -130/min. Will try cardioversion prior to discharge.  Cardiogenic shock, respiratory failure have resolved, extubated today.  CMP, possibly tachycardia-induced.  Fever, etiology unclear. Cultures sent.  Continue current treatment and CCU monitoring.
Patient seen, case d/w CCU team on rounds.  Agree with assessment and plan.  Appears euvolemic again today.  Still in A-flutter with 2:1 block. Will continue loading Amiodarone and consider AMANDEEP/CV tomorrow.  Still requires low-dose Levophed.  Continue CCU monitoring.

## 2023-05-23 NOTE — DISCHARGE NOTE PROVIDER - CARE PROVIDERS DIRECT ADDRESSES
[email or web address]

## 2023-05-23 NOTE — DISCHARGE NOTE NURSING/CASE MANAGEMENT/SOCIAL WORK - PATIENT PORTAL LINK FT
You can access the FollowMyHealth Patient Portal offered by [facility] by registering at the following website: [email or web address]. By joining [employer]’s FollowMyHealth portal, you will also be able to view your health information using other applications (apps) compatible with our system.

## 2023-05-23 NOTE — DISCHARGE NOTE PROVIDER - HOSPITAL COURSE
HPI:  78yo F hx of NSCLC stage 3 s/p CRT and surgery 2018, emphysema, severe mitral valve regurgitation s/p MVR, paroxysmal atrial fibrillation previously on coumadin but stopped after MVR and KOBY closure, former smoker presenting with progressive shortness of breath on exertion, and intermittent dry cough lasting for past 2 weeks. Denies chest pain, palpitations, headaches, wheezing, abdominal pain, fevers, sick contacts, nausea/vomiting. Was prescribed meds from PMD without symptomatic relief. Presented to the ED from home.    At the ED, VS- T: 36.7C, BP: 90/70, HR: 160s, SpO2: 99% on RA, RR: 16. Labs significant for BNP- 6227, and elevated AST/ALT (54/60). Troponin<0.01 x1. EKG showed atrial fibrillation with RVR. CT Chest NC showed unchanged RLL GGO.     Admitted to telemetry for afib with RVR. Given 60mg x1 therapeutic lovenox. 1L LR bolus. For afib with  RVR, was given 20mg IVP diltiazem and 60mg PO. HR improved to 94.      Pt was treated for the following:  #Cardiogenic/Septic shock   #Suspected aspiration pna   #pAfib RVR  #Severe MR s/p MVR and KOBY closure   - s/p cardizem and lopressor in ER for Afib RVR, rapid response and upgrade to CCU 5/13 for AMS and hypotension >> downgrade to tele on 05/17  - TTE 5/13 - diffuse hypokinesis of anterior wall, EF 25-30%  - s/p intubated for airway protection in CCU, s/p Levophed ggt. s/p amio ggt  - CTA chest - no PE, b/l pleural effusions and ggo consistent with pulmonary edema  - CTH - no acute intracranial pathology    - TSH 7.26   - s/p abx for possible PNA, dc on 05/17 as per ID, cultures neg, low procal    #Hematuria and Vaginal bleeding  - noted to have one episode vaginal bleeding on 05/18 >> no bleeding during exam on 05/19  - RN received signout from CCU RN that pt have fistula  - OBGYN consult appreciated >> no vaginal bleeding or fistula noted  - hematuria on 05/21 >>urology consulted >> no intervention    #Afib  - EP rec AMANDEEP/Cardioversion  - Cardioversion on 05/22 >> NSR  - DC planning    #Transaminitis (resolved)  - RUQ US > Unremarkable right upper quadrant abdominal ultrasound    #Anxiety   - c/w sertraline 25mg     Stable for dc today.   HPI:  76yo F hx of NSCLC stage 3 s/p CRT and surgery 2018, emphysema, severe mitral valve regurgitation s/p MVR, paroxysmal atrial fibrillation previously on coumadin but stopped after MVR and KOBY closure, former smoker presenting with progressive shortness of breath on exertion, and intermittent dry cough lasting for past 2 weeks. Denies chest pain, palpitations, headaches, wheezing, abdominal pain, fevers, sick contacts, nausea/vomiting. Was prescribed meds from PMD without symptomatic relief. Presented to the ED from home.    At the ED, VS- T: 36.7C, BP: 90/70, HR: 160s, SpO2: 99% on RA, RR: 16. Labs significant for BNP- 6227, and elevated AST/ALT (54/60). Troponin<0.01 x1. EKG showed atrial fibrillation with RVR. CT Chest NC showed unchanged RLL GGO.     Admitted to telemetry for afib with RVR. Given 60mg x1 therapeutic lovenox. 1L LR bolus. For afib with  RVR, was given 20mg IVP diltiazem and 60mg PO. HR improved to 94.      Pt was treated for the following:  #Cardiogenic/Septic shock   #Suspected aspiration pna   #pAfib RVR  #Severe MR s/p MVR and KOBY closure   - s/p cardizem and lopressor in ER for Afib RVR, rapid response and upgrade to CCU 5/13 for AMS and hypotension >> downgrade to tele on 05/17  - TTE 5/13 - diffuse hypokinesis of anterior wall, EF 25-30%  - s/p intubated for airway protection in CCU, s/p Levophed ggt. s/p amio ggt  - CTA chest - no PE, b/l pleural effusions and ggo consistent with pulmonary edema  - CTH - no acute intracranial pathology    - TSH 7.26   - s/p abx for possible PNA, dc on 05/17 as per ID, cultures neg, low procal  - on 05/23 cardio team said no need for ischemic workup or lifevest    #Hematuria and Vaginal bleeding  - noted to have one episode vaginal bleeding on 05/18 >> no bleeding during exam on 05/19  - RN received signout from CCU RN that pt have fistula  - OBGYN consult appreciated >> no vaginal bleeding or fistula noted  - hematuria on 05/21 >>urology consulted >> no intervention    #Afib  - EP rec AMANDEEP/Cardioversion  - Cardioversion on 05/22 >> NSR  - DC planning    #Transaminitis (resolved)  - RUQ US > Unremarkable right upper quadrant abdominal ultrasound    #Anxiety   - c/w sertraline 25mg     Stable for dc today.

## 2023-05-23 NOTE — DISCHARGE NOTE PROVIDER - CARE PROVIDER_API CALL
Santy Schuler  Internal Medicine  [address] [place] [address]  Phone: [phone]  Fax: [phone]  Follow Up Time:     Seng Martinez)  Cardiac Electrophysiology; Cardiovascular Disease; [facility]  [address] [place] [address]  Phone: [phone]  Fax: [phone]  Follow Up Time:    Santy Schuler  Internal Medicine  [address] [place] [address]  Phone: [phone]  Fax: [phone]  Follow Up Time:     Seng Martinez)  Cardiac Electrophysiology; Cardiovascular Disease; [facility]  [address] [place] [address]  Phone: [phone]  Fax: [phone]  Follow Up Time:     Berto Lindsey)  Urology  [facility] [place] [address]  Phone: [phone]  Fax: [phone]  Follow Up Time:

## 2023-05-23 NOTE — DISCHARGE NOTE NURSING/CASE MANAGEMENT/SOCIAL WORK - NSDCPEFALRISK_GEN_ALL_CORE
For information on Fall & Injury Prevention, visit: [email or web address] OR  [email or web address] OR  https://www.cdc.gov/steadi/patient.html

## 2023-05-24 RX ORDER — AMIODARONE HYDROCHLORIDE 400 MG/1
1 TABLET ORAL
Qty: 30 | Refills: 0
Start: 2023-05-24 | End: 2023-06-22

## 2023-06-13 ENCOUNTER — APPOINTMENT (OUTPATIENT)
Dept: CARDIOLOGY | Facility: CLINIC | Age: 78
End: 2023-06-13
Payer: MEDICARE

## 2023-06-13 VITALS
BODY MASS INDEX: 20.14 KG/M2 | SYSTOLIC BLOOD PRESSURE: 120 MMHG | WEIGHT: 118 LBS | HEART RATE: 58 BPM | HEIGHT: 64 IN | DIASTOLIC BLOOD PRESSURE: 70 MMHG

## 2023-06-13 DIAGNOSIS — Z87.891 PERSONAL HISTORY OF NICOTINE DEPENDENCE: ICD-10-CM

## 2023-06-13 PROCEDURE — 99214 OFFICE O/P EST MOD 30 MIN: CPT | Mod: 25

## 2023-06-13 PROCEDURE — 93000 ELECTROCARDIOGRAM COMPLETE: CPT

## 2023-06-13 RX ORDER — METOPROLOL TARTRATE 100 MG/1
100 TABLET, FILM COATED ORAL DAILY
Refills: 0 | Status: DISCONTINUED | COMMUNITY
End: 2023-06-13

## 2023-06-13 NOTE — HISTORY OF PRESENT ILLNESS
[FreeTextEntry1] : Establish care after discharge.\par Records reviewed.\par Former patient of Dr. Sanderson.\par \par HFrEF 25-30% with LBBB\par AF s/p AMANDEEP/CV on 5/22/2023\par MR s/p Repair (2017)\par LUIS ARMANDO adenocarcinoma s/p resection (12/2017)\par \par Denies any CP, SOB, palpitations, orthopnea/PND, dizziness or syncope.\par

## 2023-06-19 ENCOUNTER — APPOINTMENT (OUTPATIENT)
Dept: ELECTROPHYSIOLOGY | Facility: CLINIC | Age: 78
End: 2023-06-19
Payer: MEDICARE

## 2023-06-19 VITALS
SYSTOLIC BLOOD PRESSURE: 121 MMHG | HEART RATE: 55 BPM | WEIGHT: 118 LBS | DIASTOLIC BLOOD PRESSURE: 70 MMHG | BODY MASS INDEX: 19.66 KG/M2 | TEMPERATURE: 98 F | HEIGHT: 65 IN

## 2023-06-19 DIAGNOSIS — Z84.89 FAMILY HISTORY OF OTHER SPECIFIED CONDITIONS: ICD-10-CM

## 2023-06-19 DIAGNOSIS — Z78.9 OTHER SPECIFIED HEALTH STATUS: ICD-10-CM

## 2023-06-19 DIAGNOSIS — Z85.118 PERSONAL HISTORY OF OTHER MALIGNANT NEOPLASM OF BRONCHUS AND LUNG: ICD-10-CM

## 2023-06-19 DIAGNOSIS — Z80.1 FAMILY HISTORY OF MALIGNANT NEOPLASM OF TRACHEA, BRONCHUS AND LUNG: ICD-10-CM

## 2023-06-19 DIAGNOSIS — Z82.49 FAMILY HISTORY OF ISCHEMIC HEART DISEASE AND OTHER DISEASES OF THE CIRCULATORY SYSTEM: ICD-10-CM

## 2023-06-19 DIAGNOSIS — I48.91 UNSPECIFIED ATRIAL FIBRILLATION: ICD-10-CM

## 2023-06-19 PROCEDURE — 99215 OFFICE O/P EST HI 40 MIN: CPT | Mod: 25

## 2023-06-19 PROCEDURE — 93000 ELECTROCARDIOGRAM COMPLETE: CPT

## 2023-06-23 PROBLEM — I48.91 ATRIAL FIBRILLATION: Noted: 2017-10-02

## 2023-06-23 PROBLEM — Z85.118 HISTORY OF MALIGNANT NEOPLASM OF LUNG: Status: RESOLVED | Noted: 2023-06-19 | Resolved: 2023-06-23

## 2023-06-23 LAB
ALBUMIN SERPL ELPH-MCNC: 4.6 G/DL
ALP BLD-CCNC: 68 U/L
ALT SERPL-CCNC: 13 U/L
AST SERPL-CCNC: 21 U/L
BILIRUB DIRECT SERPL-MCNC: <0.2 MG/DL
BILIRUB INDIRECT SERPL-MCNC: >0.1 MG/DL
BILIRUB SERPL-MCNC: 0.3 MG/DL
PROT SERPL-MCNC: 7 G/DL
T4 FREE SERPL-MCNC: 1.2 NG/DL
T4 SERPL-MCNC: 7.2 UG/DL
TSH SERPL-ACNC: 14.15 UIU/ML

## 2023-06-23 NOTE — CARDIOLOGY SUMMARY
[de-identified] : (06/19/2023): Sinus rhythm at 55 bpm, LBBB, no specific ST/T wave abnormalities.  ms.  ms. [de-identified] : (05/22/2023): 2D echo. EF 25-30%. Severely decreased global LV systolic function. Severe LAE. 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 Mild to moderate TR. \par  [de-identified] : (05/22/2023): AMANDEEP cardioversion.

## 2023-06-23 NOTE — DISCUSSION/SUMMARY
[FreeTextEntry1] : MS. Marie Lemus is a pleasant 77 year-old woman with cardiomyopathy, HFrEF 25-30% with LBBB, persistent Atrial Fibrillation s/p AMANDEEP/CV on 5/22/2023, MR s/p Repair and KOBY closure (2017), LUIS ARMANDO adenocarcinoma s/p resection (12/2017). Patient underwent AMANDEEP/DCCV on 5/23/2023. She was started on Amiodarone (TSH 5.2 on 5/23/2023).\par \par Patient is on GDMT. I recommend to continue Metoprolol and Lisinopril and optimize dosage. \par \par I recommend to continue Amiodarone to maintain sinus rhythm. I recommend to check TFTs and LFTs.\par \par The management strategies for atrial fibrillation were discussed focusing on the issues of stroke prevention, heart rate control and rhythm control. The options of rate control, antiarrhythmic drug therapy, and electrophysiologic testing and catheter ablation therapy were discussed at length. \par \par I recommend repeat echo (scheduled for 9/23/2023)\par \par If EF < 35% despite GDMT and sinus rhythm, I will schedule patient for BiV-ICD. If EF > 35% then will discuss catheter ablation of AF.\par \par I discussed with patient plan of care in great details. I answered all her questions to her satisfaction. Patient was pleased with the visit.\par \par Patient will follow with me in 3 months’ time. Please do not hesitate to contact me at [phone] if you have any further questions regarding this patient care.\par \par  [EKG obtained to assist in diagnosis and management of assessed problem(s)] : EKG obtained to assist in diagnosis and management of assessed problem(s)

## 2023-06-23 NOTE — HISTORY OF PRESENT ILLNESS
[FreeTextEntry1] : cardiomyopathy, HFrEF 25-30% with LBBB, AF s/p AMANDEEP/CV on 5/22/2023, MR s/p Repair and KOBY closure (2017) ,\par LUIS ARMANDO adenocarcinoma s/p resection (12/2017)\par s/p AMANDEEP/DCCV on 5/23/2023\par started on Amiodarone (TSH 5.2 on 5/23/2023)\par She has no chest pain, no shortness of breath, mild dyspnea on exertion, no orthopnea, no PND. She denies dizziness, lightheadedness and syncope. She has mild to moderate exertional symptoms. She presents for evaluation.\par \par

## 2023-06-23 NOTE — ADDENDUM
[FreeTextEntry1] : Kath GARCIA assisted in documentation on 06/19/2023   acting as a scribe for Dr. Elliott Martinez.\par

## 2023-09-20 ENCOUNTER — APPOINTMENT (OUTPATIENT)
Dept: CARDIOLOGY | Facility: CLINIC | Age: 78
End: 2023-09-20
Payer: MEDICARE

## 2023-09-20 VITALS
WEIGHT: 120 LBS | HEART RATE: 60 BPM | BODY MASS INDEX: 19.97 KG/M2 | DIASTOLIC BLOOD PRESSURE: 70 MMHG | SYSTOLIC BLOOD PRESSURE: 118 MMHG

## 2023-09-20 PROCEDURE — 93306 TTE W/DOPPLER COMPLETE: CPT

## 2023-09-20 PROCEDURE — 93000 ELECTROCARDIOGRAM COMPLETE: CPT

## 2023-09-20 PROCEDURE — 93308 TTE F-UP OR LMTD: CPT

## 2023-09-20 PROCEDURE — 99214 OFFICE O/P EST MOD 30 MIN: CPT | Mod: 25

## 2023-10-16 ENCOUNTER — APPOINTMENT (OUTPATIENT)
Dept: ELECTROPHYSIOLOGY | Facility: CLINIC | Age: 78
End: 2023-10-16
Payer: MEDICARE

## 2023-10-16 VITALS
WEIGHT: 120 LBS | DIASTOLIC BLOOD PRESSURE: 70 MMHG | HEIGHT: 65 IN | SYSTOLIC BLOOD PRESSURE: 100 MMHG | BODY MASS INDEX: 19.99 KG/M2 | HEART RATE: 62 BPM | TEMPERATURE: 98 F

## 2023-10-16 PROCEDURE — 99215 OFFICE O/P EST HI 40 MIN: CPT | Mod: 25

## 2023-10-16 PROCEDURE — 93000 ELECTROCARDIOGRAM COMPLETE: CPT

## 2023-10-16 RX ORDER — SERTRALINE 25 MG/1
25 TABLET, FILM COATED ORAL DAILY
Refills: 0 | Status: ACTIVE | COMMUNITY

## 2023-10-25 ENCOUNTER — APPOINTMENT (OUTPATIENT)
Dept: HEMATOLOGY ONCOLOGY | Facility: CLINIC | Age: 78
End: 2023-10-25

## 2023-11-16 ENCOUNTER — RESULT REVIEW (OUTPATIENT)
Age: 78
End: 2023-11-16

## 2023-11-16 ENCOUNTER — OUTPATIENT (OUTPATIENT)
Dept: OUTPATIENT SERVICES | Facility: HOSPITAL | Age: 78
LOS: 1 days | End: 2023-11-16
Payer: MEDICARE

## 2023-11-16 VITALS
OXYGEN SATURATION: 97 % | HEIGHT: 64 IN | SYSTOLIC BLOOD PRESSURE: 114 MMHG | HEART RATE: 63 BPM | TEMPERATURE: 97 F | WEIGHT: 125 LBS | DIASTOLIC BLOOD PRESSURE: 64 MMHG

## 2023-11-16 DIAGNOSIS — I48.19 OTHER PERSISTENT ATRIAL FIBRILLATION: ICD-10-CM

## 2023-11-16 DIAGNOSIS — Z92.21 PERSONAL HISTORY OF ANTINEOPLASTIC CHEMOTHERAPY: Chronic | ICD-10-CM

## 2023-11-16 DIAGNOSIS — Z98.890 OTHER SPECIFIED POSTPROCEDURAL STATES: Chronic | ICD-10-CM

## 2023-11-16 DIAGNOSIS — Z92.3 PERSONAL HISTORY OF IRRADIATION: Chronic | ICD-10-CM

## 2023-11-16 DIAGNOSIS — Z01.818 ENCOUNTER FOR OTHER PREPROCEDURAL EXAMINATION: ICD-10-CM

## 2023-11-16 DIAGNOSIS — Z90.2 ACQUIRED ABSENCE OF LUNG [PART OF]: Chronic | ICD-10-CM

## 2023-11-16 LAB
ALBUMIN SERPL ELPH-MCNC: 4.4 G/DL — SIGNIFICANT CHANGE UP (ref 3.5–5.2)
ALBUMIN SERPL ELPH-MCNC: 4.4 G/DL — SIGNIFICANT CHANGE UP (ref 3.5–5.2)
ALP SERPL-CCNC: 78 U/L — SIGNIFICANT CHANGE UP (ref 30–115)
ALP SERPL-CCNC: 78 U/L — SIGNIFICANT CHANGE UP (ref 30–115)
ALT FLD-CCNC: 15 U/L — SIGNIFICANT CHANGE UP (ref 0–41)
ALT FLD-CCNC: 15 U/L — SIGNIFICANT CHANGE UP (ref 0–41)
ANION GAP SERPL CALC-SCNC: 11 MMOL/L — SIGNIFICANT CHANGE UP (ref 7–14)
ANION GAP SERPL CALC-SCNC: 11 MMOL/L — SIGNIFICANT CHANGE UP (ref 7–14)
AST SERPL-CCNC: 25 U/L — SIGNIFICANT CHANGE UP (ref 0–41)
AST SERPL-CCNC: 25 U/L — SIGNIFICANT CHANGE UP (ref 0–41)
BASOPHILS # BLD AUTO: 0.02 K/UL — SIGNIFICANT CHANGE UP (ref 0–0.2)
BASOPHILS # BLD AUTO: 0.02 K/UL — SIGNIFICANT CHANGE UP (ref 0–0.2)
BASOPHILS NFR BLD AUTO: 0.3 % — SIGNIFICANT CHANGE UP (ref 0–1)
BASOPHILS NFR BLD AUTO: 0.3 % — SIGNIFICANT CHANGE UP (ref 0–1)
BILIRUB SERPL-MCNC: 0.3 MG/DL — SIGNIFICANT CHANGE UP (ref 0.2–1.2)
BILIRUB SERPL-MCNC: 0.3 MG/DL — SIGNIFICANT CHANGE UP (ref 0.2–1.2)
BLD GP AB SCN SERPL QL: SIGNIFICANT CHANGE UP
BLD GP AB SCN SERPL QL: SIGNIFICANT CHANGE UP
BUN SERPL-MCNC: 19 MG/DL — SIGNIFICANT CHANGE UP (ref 10–20)
BUN SERPL-MCNC: 19 MG/DL — SIGNIFICANT CHANGE UP (ref 10–20)
CALCIUM SERPL-MCNC: 10.1 MG/DL — SIGNIFICANT CHANGE UP (ref 8.4–10.5)
CALCIUM SERPL-MCNC: 10.1 MG/DL — SIGNIFICANT CHANGE UP (ref 8.4–10.5)
CHLORIDE SERPL-SCNC: 102 MMOL/L — SIGNIFICANT CHANGE UP (ref 98–110)
CHLORIDE SERPL-SCNC: 102 MMOL/L — SIGNIFICANT CHANGE UP (ref 98–110)
CO2 SERPL-SCNC: 27 MMOL/L — SIGNIFICANT CHANGE UP (ref 17–32)
CO2 SERPL-SCNC: 27 MMOL/L — SIGNIFICANT CHANGE UP (ref 17–32)
CREAT SERPL-MCNC: 1.1 MG/DL — SIGNIFICANT CHANGE UP (ref 0.7–1.5)
CREAT SERPL-MCNC: 1.1 MG/DL — SIGNIFICANT CHANGE UP (ref 0.7–1.5)
EGFR: 51 ML/MIN/1.73M2 — LOW
EGFR: 51 ML/MIN/1.73M2 — LOW
EOSINOPHIL # BLD AUTO: 0.07 K/UL — SIGNIFICANT CHANGE UP (ref 0–0.7)
EOSINOPHIL # BLD AUTO: 0.07 K/UL — SIGNIFICANT CHANGE UP (ref 0–0.7)
EOSINOPHIL NFR BLD AUTO: 1.1 % — SIGNIFICANT CHANGE UP (ref 0–8)
EOSINOPHIL NFR BLD AUTO: 1.1 % — SIGNIFICANT CHANGE UP (ref 0–8)
GLUCOSE SERPL-MCNC: 90 MG/DL — SIGNIFICANT CHANGE UP (ref 70–99)
GLUCOSE SERPL-MCNC: 90 MG/DL — SIGNIFICANT CHANGE UP (ref 70–99)
HCT VFR BLD CALC: 35.5 % — LOW (ref 37–47)
HCT VFR BLD CALC: 35.5 % — LOW (ref 37–47)
HGB BLD-MCNC: 11.8 G/DL — LOW (ref 12–16)
HGB BLD-MCNC: 11.8 G/DL — LOW (ref 12–16)
IMM GRANULOCYTES NFR BLD AUTO: 0.6 % — HIGH (ref 0.1–0.3)
IMM GRANULOCYTES NFR BLD AUTO: 0.6 % — HIGH (ref 0.1–0.3)
LYMPHOCYTES # BLD AUTO: 1.22 K/UL — SIGNIFICANT CHANGE UP (ref 1.2–3.4)
LYMPHOCYTES # BLD AUTO: 1.22 K/UL — SIGNIFICANT CHANGE UP (ref 1.2–3.4)
LYMPHOCYTES # BLD AUTO: 18.9 % — LOW (ref 20.5–51.1)
LYMPHOCYTES # BLD AUTO: 18.9 % — LOW (ref 20.5–51.1)
MCHC RBC-ENTMCNC: 31.5 PG — HIGH (ref 27–31)
MCHC RBC-ENTMCNC: 31.5 PG — HIGH (ref 27–31)
MCHC RBC-ENTMCNC: 33.2 G/DL — SIGNIFICANT CHANGE UP (ref 32–37)
MCHC RBC-ENTMCNC: 33.2 G/DL — SIGNIFICANT CHANGE UP (ref 32–37)
MCV RBC AUTO: 94.7 FL — SIGNIFICANT CHANGE UP (ref 81–99)
MCV RBC AUTO: 94.7 FL — SIGNIFICANT CHANGE UP (ref 81–99)
MONOCYTES # BLD AUTO: 0.56 K/UL — SIGNIFICANT CHANGE UP (ref 0.1–0.6)
MONOCYTES # BLD AUTO: 0.56 K/UL — SIGNIFICANT CHANGE UP (ref 0.1–0.6)
MONOCYTES NFR BLD AUTO: 8.7 % — SIGNIFICANT CHANGE UP (ref 1.7–9.3)
MONOCYTES NFR BLD AUTO: 8.7 % — SIGNIFICANT CHANGE UP (ref 1.7–9.3)
NEUTROPHILS # BLD AUTO: 4.55 K/UL — SIGNIFICANT CHANGE UP (ref 1.4–6.5)
NEUTROPHILS # BLD AUTO: 4.55 K/UL — SIGNIFICANT CHANGE UP (ref 1.4–6.5)
NEUTROPHILS NFR BLD AUTO: 70.4 % — SIGNIFICANT CHANGE UP (ref 42.2–75.2)
NEUTROPHILS NFR BLD AUTO: 70.4 % — SIGNIFICANT CHANGE UP (ref 42.2–75.2)
NRBC # BLD: 0 /100 WBCS — SIGNIFICANT CHANGE UP (ref 0–0)
NRBC # BLD: 0 /100 WBCS — SIGNIFICANT CHANGE UP (ref 0–0)
PLATELET # BLD AUTO: 255 K/UL — SIGNIFICANT CHANGE UP (ref 130–400)
PLATELET # BLD AUTO: 255 K/UL — SIGNIFICANT CHANGE UP (ref 130–400)
PMV BLD: 9.2 FL — SIGNIFICANT CHANGE UP (ref 7.4–10.4)
PMV BLD: 9.2 FL — SIGNIFICANT CHANGE UP (ref 7.4–10.4)
POTASSIUM SERPL-MCNC: 4.7 MMOL/L — SIGNIFICANT CHANGE UP (ref 3.5–5)
POTASSIUM SERPL-MCNC: 4.7 MMOL/L — SIGNIFICANT CHANGE UP (ref 3.5–5)
POTASSIUM SERPL-SCNC: 4.7 MMOL/L — SIGNIFICANT CHANGE UP (ref 3.5–5)
POTASSIUM SERPL-SCNC: 4.7 MMOL/L — SIGNIFICANT CHANGE UP (ref 3.5–5)
PROT SERPL-MCNC: 6.9 G/DL — SIGNIFICANT CHANGE UP (ref 6–8)
PROT SERPL-MCNC: 6.9 G/DL — SIGNIFICANT CHANGE UP (ref 6–8)
RBC # BLD: 3.75 M/UL — LOW (ref 4.2–5.4)
RBC # BLD: 3.75 M/UL — LOW (ref 4.2–5.4)
RBC # FLD: 13.5 % — SIGNIFICANT CHANGE UP (ref 11.5–14.5)
RBC # FLD: 13.5 % — SIGNIFICANT CHANGE UP (ref 11.5–14.5)
SODIUM SERPL-SCNC: 140 MMOL/L — SIGNIFICANT CHANGE UP (ref 135–146)
SODIUM SERPL-SCNC: 140 MMOL/L — SIGNIFICANT CHANGE UP (ref 135–146)
WBC # BLD: 6.46 K/UL — SIGNIFICANT CHANGE UP (ref 4.8–10.8)
WBC # BLD: 6.46 K/UL — SIGNIFICANT CHANGE UP (ref 4.8–10.8)
WBC # FLD AUTO: 6.46 K/UL — SIGNIFICANT CHANGE UP (ref 4.8–10.8)
WBC # FLD AUTO: 6.46 K/UL — SIGNIFICANT CHANGE UP (ref 4.8–10.8)

## 2023-11-16 PROCEDURE — 99214 OFFICE O/P EST MOD 30 MIN: CPT | Mod: 25

## 2023-11-16 PROCEDURE — 80053 COMPREHEN METABOLIC PANEL: CPT

## 2023-11-16 PROCEDURE — 71046 X-RAY EXAM CHEST 2 VIEWS: CPT

## 2023-11-16 PROCEDURE — 86901 BLOOD TYPING SEROLOGIC RH(D): CPT

## 2023-11-16 PROCEDURE — 86900 BLOOD TYPING SEROLOGIC ABO: CPT

## 2023-11-16 PROCEDURE — 86850 RBC ANTIBODY SCREEN: CPT

## 2023-11-16 PROCEDURE — 93010 ELECTROCARDIOGRAM REPORT: CPT

## 2023-11-16 PROCEDURE — 85025 COMPLETE CBC W/AUTO DIFF WBC: CPT

## 2023-11-16 PROCEDURE — 71046 X-RAY EXAM CHEST 2 VIEWS: CPT | Mod: 26

## 2023-11-16 PROCEDURE — 36415 COLL VENOUS BLD VENIPUNCTURE: CPT

## 2023-11-16 PROCEDURE — 93005 ELECTROCARDIOGRAM TRACING: CPT

## 2023-11-16 NOTE — H&P PST ADULT - NSICDXPASTMEDICALHX_GEN_ALL_CORE_FT
PAST MEDICAL HISTORY:  HTN (hypertension)     Non-small cell lung cancer     Paroxysmal atrial fibrillation     Severe mitral valve regurgitation

## 2023-11-16 NOTE — H&P PST ADULT - NSANTHOSAYNRD_GEN_A_CORE
No. ASMITA screening performed.  STOP BANG Legend: 0-2 = LOW Risk; 3-4 = INTERMEDIATE Risk; 5-8 = HIGH Risk

## 2023-11-16 NOTE — H&P PST ADULT - REASON FOR ADMISSION
Suite: EP LabProceduralist: Seng Martinez  Confirmed Surgery DateTime: 12- - 7:30PAST DateTime: 11- - 11:30Procedure: AF ABLATION/ AMANDEEP/RF  ERP?: UnavailableLaterality: N/ALength of Procedure: 180 Minutes  Anesthesia Type: General

## 2023-11-16 NOTE — H&P PST ADULT - NSICDXPASTSURGICALHX_GEN_ALL_CORE_FT
PAST SURGICAL HISTORY:  History of chemotherapy     S/P lobectomy of lung     S/P mitral valve repair     S/P radiation therapy

## 2023-11-16 NOTE — H&P PST ADULT - HISTORY OF PRESENT ILLNESS
PT REPORTS ONSET OF AFIB SINCE MAY 2023.  CURRENTLY ON MEDICATION AND IS UNDER CONTROL   ADVISED TO HAVE THIS PROCEDURE TO ATTEMPT GETTING INTO RSR  DENIES PAIN  PATIENT CURRENTLY DENIES CHEST PAIN    PALPITATIONS,  DYSURIA, OR URI WITHIN THE IN PAST 2 WEEKS    -Denies travel outside the [place] in the past 30 days  -Patient denies any signs or symptoms of COVID 19 and denies contact with known positive individuals.    -Patient was instructed to quarantine pre-procedure, practice exposure control measures, continue to self-monitor and report any concerns to their proceduralist.  -Pt advised self quarantine till day of procedure    ANESTHESIA ALERT  NO--Difficult Airway  NO--History of neck surgery or radiation  NO--Limited ROM of neck  NO--History of Malignant hyperthermia  NO--No personal or family history of Pseudocholinesterase deficiency.  NO--Prior Anesthesia Complication  NO--Latex Allergy  NO--Loose teeth  NO--History of Rheumatoid Arthritis  NO--Bleeding risk  NO--ASMITA  NO--Implants  NO--Other_____    -PT DENIES ANY RASHES, ABRASION, OR OPEN WOUNDS     -Pt denies any suicidal ideation or thoughts.  Pt states not a threat to self or others.    AS PER THE PT, THIS IS HIS/HER COMPLETE MEDICAL AND SURGICAL HX, INCLUDING MEDICATIONS PRESCRIBED AND OVER THE COUNTER    Patient verbalized understanding of instructions and was given the opportunity to ask questions and have them answered.

## 2023-11-17 DIAGNOSIS — I48.19 OTHER PERSISTENT ATRIAL FIBRILLATION: ICD-10-CM

## 2023-11-17 DIAGNOSIS — Z01.818 ENCOUNTER FOR OTHER PREPROCEDURAL EXAMINATION: ICD-10-CM

## 2023-11-30 PROBLEM — Z95.2 PRESENCE OF PROSTHETIC HEART VALVE: Chronic | Status: INACTIVE | Noted: 2023-05-12 | Resolved: 2023-11-16

## 2023-12-06 ENCOUNTER — INPATIENT (INPATIENT)
Facility: HOSPITAL | Age: 78
LOS: 0 days | Discharge: HOME | DRG: 274 | End: 2023-12-07
Attending: STUDENT IN AN ORGANIZED HEALTH CARE EDUCATION/TRAINING PROGRAM | Admitting: STUDENT IN AN ORGANIZED HEALTH CARE EDUCATION/TRAINING PROGRAM
Payer: MEDICARE

## 2023-12-06 ENCOUNTER — APPOINTMENT (OUTPATIENT)
Dept: ELECTROPHYSIOLOGY | Facility: HOSPITAL | Age: 78
End: 2023-12-06

## 2023-12-06 ENCOUNTER — TRANSCRIPTION ENCOUNTER (OUTPATIENT)
Age: 78
End: 2023-12-06

## 2023-12-06 VITALS
OXYGEN SATURATION: 98 % | HEART RATE: 70 BPM | DIASTOLIC BLOOD PRESSURE: 62 MMHG | HEIGHT: 65 IN | RESPIRATION RATE: 16 BRPM | SYSTOLIC BLOOD PRESSURE: 137 MMHG

## 2023-12-06 DIAGNOSIS — Z90.2 ACQUIRED ABSENCE OF LUNG [PART OF]: Chronic | ICD-10-CM

## 2023-12-06 DIAGNOSIS — I48.19 OTHER PERSISTENT ATRIAL FIBRILLATION: ICD-10-CM

## 2023-12-06 DIAGNOSIS — Z92.3 PERSONAL HISTORY OF IRRADIATION: Chronic | ICD-10-CM

## 2023-12-06 DIAGNOSIS — Z92.21 PERSONAL HISTORY OF ANTINEOPLASTIC CHEMOTHERAPY: Chronic | ICD-10-CM

## 2023-12-06 DIAGNOSIS — Z98.890 OTHER SPECIFIED POSTPROCEDURAL STATES: Chronic | ICD-10-CM

## 2023-12-06 LAB
APTT BLD: 37.8 SEC — SIGNIFICANT CHANGE UP (ref 27–39.2)
APTT BLD: 37.8 SEC — SIGNIFICANT CHANGE UP (ref 27–39.2)
BLD GP AB SCN SERPL QL: SIGNIFICANT CHANGE UP
BLD GP AB SCN SERPL QL: SIGNIFICANT CHANGE UP
INR BLD: 1.06 RATIO — SIGNIFICANT CHANGE UP (ref 0.65–1.3)
INR BLD: 1.06 RATIO — SIGNIFICANT CHANGE UP (ref 0.65–1.3)
PROTHROM AB SERPL-ACNC: 12.1 SEC — SIGNIFICANT CHANGE UP (ref 9.95–12.87)
PROTHROM AB SERPL-ACNC: 12.1 SEC — SIGNIFICANT CHANGE UP (ref 9.95–12.87)

## 2023-12-06 PROCEDURE — 93656 COMPRE EP EVAL ABLTJ ATR FIB: CPT

## 2023-12-06 PROCEDURE — 93655 ICAR CATH ABLTJ DSCRT ARRHYT: CPT

## 2023-12-06 PROCEDURE — 93306 TTE W/DOPPLER COMPLETE: CPT | Mod: 26

## 2023-12-06 PROCEDURE — 93623 PRGRMD STIMJ&PACG IV RX NFS: CPT | Mod: 26

## 2023-12-06 PROCEDURE — 93657 TX L/R ATRIAL FIB ADDL: CPT

## 2023-12-06 PROCEDURE — C9399: CPT

## 2023-12-06 PROCEDURE — 93312 ECHO TRANSESOPHAGEAL: CPT | Mod: 26

## 2023-12-06 PROCEDURE — 93005 ELECTROCARDIOGRAM TRACING: CPT

## 2023-12-06 PROCEDURE — 93623 PRGRMD STIMJ&PACG IV RX NFS: CPT

## 2023-12-06 PROCEDURE — 93325 DOPPLER ECHO COLOR FLOW MAPG: CPT | Mod: 26,59

## 2023-12-06 RX ORDER — LANOLIN ALCOHOL/MO/W.PET/CERES
5 CREAM (GRAM) TOPICAL AT BEDTIME
Refills: 0 | Status: DISCONTINUED | OUTPATIENT
Start: 2023-12-06 | End: 2023-12-07

## 2023-12-06 RX ORDER — FAMOTIDINE 10 MG/ML
40 INJECTION INTRAVENOUS DAILY
Refills: 0 | Status: DISCONTINUED | OUTPATIENT
Start: 2023-12-07 | End: 2023-12-07

## 2023-12-06 RX ORDER — INFLUENZA VIRUS VACCINE 15; 15; 15; 15 UG/.5ML; UG/.5ML; UG/.5ML; UG/.5ML
0.7 SUSPENSION INTRAMUSCULAR ONCE
Refills: 0 | Status: DISCONTINUED | OUTPATIENT
Start: 2023-12-06 | End: 2023-12-07

## 2023-12-06 RX ORDER — SERTRALINE 25 MG/1
25 TABLET, FILM COATED ORAL DAILY
Refills: 0 | Status: DISCONTINUED | OUTPATIENT
Start: 2023-12-06 | End: 2023-12-07

## 2023-12-06 RX ORDER — AMIODARONE HYDROCHLORIDE 400 MG/1
200 TABLET ORAL DAILY
Refills: 0 | Status: DISCONTINUED | OUTPATIENT
Start: 2023-12-06 | End: 2023-12-07

## 2023-12-06 RX ORDER — CEFAZOLIN SODIUM 1 G
2000 VIAL (EA) INJECTION ONCE
Refills: 0 | Status: COMPLETED | OUTPATIENT
Start: 2023-12-06 | End: 2023-12-06

## 2023-12-06 RX ORDER — SERTRALINE 25 MG/1
1 TABLET, FILM COATED ORAL
Refills: 0 | DISCHARGE

## 2023-12-06 RX ORDER — LISINOPRIL 2.5 MG/1
5 TABLET ORAL DAILY
Refills: 0 | Status: DISCONTINUED | OUTPATIENT
Start: 2023-12-06 | End: 2023-12-07

## 2023-12-06 RX ORDER — APIXABAN 2.5 MG/1
5 TABLET, FILM COATED ORAL EVERY 12 HOURS
Refills: 0 | Status: DISCONTINUED | OUTPATIENT
Start: 2023-12-06 | End: 2023-12-07

## 2023-12-06 RX ORDER — METOPROLOL TARTRATE 50 MG
100 TABLET ORAL DAILY
Refills: 0 | Status: DISCONTINUED | OUTPATIENT
Start: 2023-12-06 | End: 2023-12-07

## 2023-12-06 RX ORDER — FAMOTIDINE 10 MG/ML
20 INJECTION INTRAVENOUS ONCE
Refills: 0 | Status: DISCONTINUED | OUTPATIENT
Start: 2023-12-06 | End: 2023-12-07

## 2023-12-06 RX ORDER — ACETAMINOPHEN 500 MG
650 TABLET ORAL EVERY 6 HOURS
Refills: 0 | Status: DISCONTINUED | OUTPATIENT
Start: 2023-12-06 | End: 2023-12-07

## 2023-12-06 RX ADMIN — Medication 650 MILLIGRAM(S): at 22:39

## 2023-12-06 RX ADMIN — LISINOPRIL 5 MILLIGRAM(S): 2.5 TABLET ORAL at 19:19

## 2023-12-06 RX ADMIN — Medication 5 MILLIGRAM(S): at 21:39

## 2023-12-06 RX ADMIN — Medication 100 MILLIGRAM(S): at 08:40

## 2023-12-06 RX ADMIN — Medication 100 MILLIGRAM(S): at 19:19

## 2023-12-06 RX ADMIN — APIXABAN 5 MILLIGRAM(S): 2.5 TABLET, FILM COATED ORAL at 16:27

## 2023-12-06 RX ADMIN — Medication 650 MILLIGRAM(S): at 21:39

## 2023-12-06 RX ADMIN — AMIODARONE HYDROCHLORIDE 200 MILLIGRAM(S): 400 TABLET ORAL at 19:19

## 2023-12-06 NOTE — CHART NOTE - NSCHARTNOTEFT_GEN_A_CORE
I saw and examined patient and I reviewed his chart and blood work. I attest that there has been no clinical change in patient's condition since last assessment documented in H&P, consult, or last office visit.
Electrophysiology Brief Post-Op Note      I have personally seen and examined the patient.  I agree with the history and physical which I have reviewed and noted any changes below.    PRE-OP DIAGNOSIS:  -Persistent Atrial Fibrillation  -Atrial Flutter    POST-OP DIAGNOSIS:  -Persistent Atrial Fibrillation  -Atrial Flutter      PROCEDURE:  -RF ablation of Atrial Fibrillation   -RF ablation of Atrial Flutter  -3D mapping   -Infusion of Medicine  -Transeptal puncture  -Use of intracardiac Echo  -AMANDEEP      Vascular Access used (using Ultrasound Guidance)  -Right Femoral Vein: 8F    -Left Femoral Vein: 11F 8F  -Right Femoral Artery: none    All sheaths and wires removed and manual pressure applied.    Physician: Juan  Assistant: None    ANESTHESIA TYPE:  [X]General Anesthesia  [  ] Sedation  [X] Local/Regional      CONDITION  [  ] Critical  [  ] Serious  [  ]Fair  [X]Good      SPECIMENS REMOVED (IF APPLICABLE): NONE      IMPLANTS (IF APPLICABLE): NONE      FINDINGS (see below)  -Successful Pulmonary Veins Isolation   -Successful Posterior Wall Isolation  -Successful CTI isthmus ablation  -Scar homogenization mid lateral RA  -No pericardial effusion on ICE  -No immediate complications  -ESTIMATED BLOOD LOSS:  15 mL  -Contrast Used: none        PLAN OF CARE  -	Start Eliquis 5 mg q12 tonight   -	Start pepcid 20 mg daily tonight  -	Bed rest for 4 hours  -	Admit to telemetry

## 2023-12-06 NOTE — ASU PATIENT PROFILE, ADULT - FALL HARM RISK - UNIVERSAL INTERVENTIONS
Bed in lowest position, wheels locked, appropriate side rails in place/Call bell, personal items and telephone in reach/Instruct patient to call for assistance before getting out of bed or chair/Non-slip footwear when patient is out of bed/[place] to call system/Physically safe environment - no spills, clutter or unnecessary equipment/Purposeful Proactive Rounding/Room/bathroom lighting operational, light cord in reach Bed in lowest position, wheels locked, appropriate side rails in place/Call bell, personal items and telephone in reach/Instruct patient to call for assistance before getting out of bed or chair/Non-slip footwear when patient is out of bed/[place] to call system/Physically safe environment - no spills, clutter or unnecessary equipment/Purposeful Proactive Rounding/Room/bathroom lighting operational, light cord in reach

## 2023-12-06 NOTE — PATIENT PROFILE ADULT - FALL HARM RISK - HARM RISK INTERVENTIONS
Assistance with ambulation/Assistance OOB with selected safe patient handling equipment/Communicate Risk of Fall with Harm to all staff/Monitor gait and stability/Reinforce activity limits and safety measures with patient and family/Sit up slowly, dangle for a short time, stand at bedside before walking/Tailored Fall Risk Interventions/Use of alarms - bed, chair and/or voice tab/Visual Cue: Yellow wristband and red socks/Bed in lowest position, wheels locked, appropriate side rails in place/Call bell, personal items and telephone in reach/Instruct patient to call for assistance before getting out of bed or chair/Non-slip footwear when patient is out of bed/[place] to call system/Physically safe environment - no spills, clutter or unnecessary equipment/Purposeful Proactive Rounding/Room/bathroom lighting operational, light cord in reach Assistance with ambulation/Assistance OOB with selected safe patient handling equipment/Communicate Risk of Fall with Harm to all staff/Monitor gait and stability/Reinforce activity limits and safety measures with patient and family/Sit up slowly, dangle for a short time, stand at bedside before walking/Tailored Fall Risk Interventions/Use of alarms - bed, chair and/or voice tab/Visual Cue: Yellow wristband and red socks/Bed in lowest position, wheels locked, appropriate side rails in place/Call bell, personal items and telephone in reach/Instruct patient to call for assistance before getting out of bed or chair/Non-slip footwear when patient is out of bed/[place] to call system/Physically safe environment - no spills, clutter or unnecessary equipment/Purposeful Proactive Rounding/Room/bathroom lighting operational, light cord in reach

## 2023-12-07 ENCOUNTER — TRANSCRIPTION ENCOUNTER (OUTPATIENT)
Age: 78
End: 2023-12-07

## 2023-12-07 VITALS — SYSTOLIC BLOOD PRESSURE: 103 MMHG | RESPIRATION RATE: 20 BRPM | HEART RATE: 74 BPM | DIASTOLIC BLOOD PRESSURE: 51 MMHG

## 2023-12-07 PROCEDURE — 99232 SBSQ HOSP IP/OBS MODERATE 35: CPT

## 2023-12-07 PROCEDURE — 99239 HOSP IP/OBS DSCHRG MGMT >30: CPT

## 2023-12-07 PROCEDURE — 93010 ELECTROCARDIOGRAM REPORT: CPT

## 2023-12-07 RX ORDER — FAMOTIDINE 10 MG/ML
1 INJECTION INTRAVENOUS
Qty: 30 | Refills: 0
Start: 2023-12-07 | End: 2024-01-05

## 2023-12-07 RX ADMIN — APIXABAN 5 MILLIGRAM(S): 2.5 TABLET, FILM COATED ORAL at 05:13

## 2023-12-07 RX ADMIN — SERTRALINE 25 MILLIGRAM(S): 25 TABLET, FILM COATED ORAL at 11:27

## 2023-12-07 RX ADMIN — FAMOTIDINE 40 MILLIGRAM(S): 10 INJECTION INTRAVENOUS at 11:27

## 2023-12-07 RX ADMIN — Medication 100 MILLIGRAM(S): at 09:09

## 2023-12-07 RX ADMIN — AMIODARONE HYDROCHLORIDE 200 MILLIGRAM(S): 400 TABLET ORAL at 05:13

## 2023-12-07 RX ADMIN — LISINOPRIL 5 MILLIGRAM(S): 2.5 TABLET ORAL at 09:10

## 2023-12-07 NOTE — DISCHARGE NOTE NURSING/CASE MANAGEMENT/SOCIAL WORK - NSDCPEFALRISK_GEN_ALL_CORE
For information on Fall & Injury Prevention, visit: [email or web address]://www.[facility]/[email or web address]-prevention-protects-and-maintains-health-[email or web address]-mobility OR  https://www.[facility]/news/fall-prevention-tips-[email or web address]-injury OR  https://www.cdc.gov/steadi/patient.html For information on Fall & Injury Prevention, visit: [email or web address]://www.[facility]/news/fall-prevention-protects-and-maintains-health-and-mobility OR  https://www.[facility]/news/fall-prevention-tips-to-avoid-injury OR  https://www.cdc.gov/steadi/patient.html

## 2023-12-07 NOTE — DISCHARGE NOTE PROVIDER - CARE PROVIDERS DIRECT ADDRESSES
,[email or web address]

## 2023-12-07 NOTE — DISCHARGE NOTE PROVIDER - PROVIDER TOKENS
PROVIDER:[TOKEN:[[id number]],FOLLOWUP:[1 month],ESTABLISHEDPATIENT:[T]] PROVIDER:[TOKEN:[[id number]],FOLLOWUP:[1 month],ESTABLISHEDPATIENT:[T]] PROVIDER:[TOKEN:[[id number]],SCHEDULEDAPPT:[01/22/2024],ESTABLISHEDPATIENT:[T]] PROVIDER:[TOKEN:[[id number]],SCHEDULEDAPPT:[01/22/2024],ESTABLISHEDPATIENT:[T]]

## 2023-12-07 NOTE — DISCHARGE NOTE PROVIDER - NSDCCPTREATMENT_GEN_ALL_CORE_FT
PRINCIPAL PROCEDURE  Procedure: Cardiac ablation  Findings and Treatment: - Please start taking famotidine 40 mg daily for 30 days.  - You should restart your Eliquis).  - You may shower today.  - Do not drive or operate heavy machinery for 3 days.  - Do not submerge in water (example: baths, swimming) for 1 week.  - No squatting, exertional activities, or lifting anything > 10 lbs for 1 week.  - Any sudden swelling, redness, fever, discharge, or severe pain, call the Electrophysiology Office at [phone].     PRINCIPAL PROCEDURE  Procedure: Cardiac ablation  Findings and Treatment: - Please start taking famotidine 40 mg daily for 30 days.  - You should restart your Eliquis).  - You may shower today.  - Do not drive or operate heavy machinery for 3 days.  - Do not submerge in water (example: baths, swimming) for 1 week.  - No squatting, exertional activities, or lifting anything > 10 lbs for 1 week.  - Any sudden swelling, redness, fever, discharge, or severe pain, call the Electrophysiology Office at [phone].

## 2023-12-07 NOTE — DISCHARGE NOTE PROVIDER - CARE PROVIDER_API CALL
Seng Martinez  Cardiac Electrophysiology  [address] [place] [address], [address]  [place] [address]  Phone: [phone]  Fax: [phone]  Established Patient  Follow Up Time: 1 month   Seng Martinez  Cardiac Electrophysiology  [address] [place] [address], [address]  [place] [address]  Phone: [phone]  Fax: [phone]  Established Patient  Follow Up Time: 1 month   Seng Martinez  Cardiac Electrophysiology  [address] [place] [address], [address]  [place] [address]  Phone: [phone]  Fax: [phone]  Established Patient  Scheduled Appointment: 01/22/2024   Seng Martinez  Cardiac Electrophysiology  [address] [place] [address], [address]  [place] [address]  Phone: [phone]  Fax: [phone]  Established Patient  Scheduled Appointment: 01/22/2024

## 2023-12-07 NOTE — DISCHARGE NOTE NURSING/CASE MANAGEMENT/SOCIAL WORK - PATIENT PORTAL LINK FT
You can access the FollowMyHealth Patient Portal offered by [facility] by registering at the following website: [email or web address]. By joining [facility]’s FollowMyHealth portal, you will also be able to view your health information using other applications (apps) compatible with our system. You can access the FollowMyHealth Patient Portal offered by [facility] by registering at the following website: [email or web address]. By joining [facility]’s FollowMyHealth portal, you will also be able to view your health information using other applications (apps) compatible with our system.

## 2023-12-07 NOTE — DISCHARGE NOTE PROVIDER - HOSPITAL COURSE
77-year-old woman with cardiomyopathy, HFrEF 25-30% with LBBB, persistent Atrial Fibrillation s/p AMANDEEP/CV on 5/22/2023 (on Eliquis), MR s/p Repair and KOBY closure (2017), LUIS ARMANDO adenocarcinoma s/p resection (12/2017) who presented to [facility] for elective Afib ablation.    On 12/6/23 patient underwent successful radiofrequency ablation for atrial fibrillation. S/P successful PVI and posterior wall isolation for Afib. S/P successful RF CTI ablation for atrial flutter. Patient was monitored overnight. On POD 1 patient remains HD stable with no complaints. Patient remains in SR with no arrhythmias noted on tele. Examination of B/L common femoral venous access sites reveal a clear and dry area with no hematoma or erythema.(For PVI- Patient should continue Eliquis for thromboembolic prophylaxis). Patient is being DC home in stable condition.  77-year-old woman with cardiomyopathy, HFrEF 25-30% with LBBB, persistent Atrial Fibrillation s/p AMANDEEP/CV on 5/22/2023 (on Eliquis), MR s/p Repair and KOBY closure (2017), LUIS ARMANDO adenocarcinoma s/p resection (12/2017) who presented to [facility] for elective Afib ablation.    On 12/6/23 patient underwent successful radiofrequency ablation for atrial fibrillation. S/P successful PVI and posterior wall isolation for Afib. S/P successful RF CTI ablation for atrial flutter. Patient was monitored overnight. On POD 1 patient remains HD stable with no complaints. Patient remains in SR with no arrhythmias noted on tele. Examination of B/L common femoral venous access sites reveal a clear and dry area with no hematoma or erythema.(For PVI- Patient should continue Eliquis for thromboembolic prophylaxis). Patient is being DC home in stable condition.  77-year-old woman with cardiomyopathy, HFrEF 25-30% with LBBB, persistent Atrial Fibrillation s/p AMANDEEP/CV on 5/22/2023 (on Eliquis), MR s/p Repair and KOBY closure (2017), LUIS ARMANDO adenocarcinoma s/p resection (12/2017) who presented to [facility] for elective Afib ablation.    On 12/6/23 patient underwent successful radiofrequency ablation for atrial fibrillation. S/P successful PVI and posterior wall isolation for Afib. S/P successful RF CTI ablation for atrial flutter. Patient was monitored overnight. On POD 1 patient remains HD stable with no complaints. Patient remains in SR with no arrhythmias noted on tele. Examination of B/L common femoral venous access sites reveal a clear and dry area with no hematoma or erythema. For PVI- Patient should continue Eliquis for thromboembolic prophylaxis. Patient is being DC home in stable condition.  77-year-old woman with cardiomyopathy, HFrEF 25-30% with LBBB, persistent Atrial Fibrillation s/p AMANDEEP/CV on 5/22/2023 (on Eliquis), MR s/p Repair and KOBY closure (2017), LUIS ARMANDO adenocarcinoma s/p resection (12/2017) who presented to [facility] for elective Afib ablation.    On 12/6/23 patient underwent successful radiofrequency ablation for atrial fibrillation. S/P successful PVI and posterior wall isolation for Afib. S/P successful RF CTI ablation for atrial flutter. Patient was monitored overnight. On POD 1 patient remains HD stable with no complaints. Patient remains in SR with no arrhythmias noted on tele. Examination of B/L common femoral venous access sites reveal a clear and dry area with no hematoma or erythema. For PVI- Patient should continue Eliquis for thromboembolic prophylaxis. Patient is being DC home in stable condition.

## 2023-12-07 NOTE — PROGRESS NOTE ADULT - REASON FOR ADMISSION
This provider is located at the [facility] (through [facility]), [address], [facility], [address] using a secure [employer] Video Visit through [employer]. Patient is being seen remotely via telehealth at their home address in [place], and stated they are in a secure environment for this session. The patient's condition being diagnosed/treated is appropriate for telemedicine. The provider identified herself as well as her credentials.   The patient, and/or patients guardian, consent to be seen remotely, and when consent is given they understand that the consent allows for patient identifiable information to be sent to a third party as needed.   They may refuse to be seen remotely at any time. The electronic data is encrypted and password protected, and the patient and/or guardian has been advised of the potential risks to privacy not withstanding such measures.    You have chosen to receive care through a telehealth visit.  Do you consent to use a video/audio connection for your medical care today? Yes    Subjective   Lovely Mixon is a 54 y.o. female who presents today for follow up    Chief Complaint:  Depression, anxiety, history of sleeping difficulties, nightmares, and history of PTSD follow-up    Accompanied by: The patient is interviewed alone at today's encounter    History of Present Illness:   The patient describes her mood as both anxious and depressed over the last few weeks.  The patient states she feels she is doing okay at this time, and she is working on trying to cope better with her depressive and anxious symptoms.  The patient reports there are some days she feels better and wonders if she even has the psychiatric diagnoses that she has, but reports she also knows how bad she felt before taking her current psychotropic medication regimen and does not want to feel that way again.  The patient reports her therapist is changing practices, but she will be  following her therapist to the therapist's new practice.  The patient rates her symptoms of depression at a 8/10 on a 0-10 scale, with 10 being the worst.  The patient rates her symptoms of anxiety at a 8/10 on a 0-10 scale, with 10 being the worst.  The patient reports she is an excessive worrier.  The patient reports her appetite as good.  The patient reports her sleep as good with the use of melatonin 10 mg by mouth over-the-counter.  The patient reports she does dream some, but does not remember having any bad dreams or nightmares.  The patient reports she was evaluated by a surgeon, and she was informed the lumps in her breasts were cysts and nothing to worry about, and she has a repeat mammogram in 6 months.  The patient denies any other new medical problems or changes in medications since last appointment with this facility.  The patient reports compliance with her current medication regimen.  The patient denies any side effects or concerns from her current medication regimen.   The patient would like to not adjust or change her medications at this visit.  The patient denies any suicidal or homicidal ideations, plans, or intent at today's encounter and is convincing.  The patient denies any auditory hallucinations or visual hallucinations.  The patient does not endorse any significant symptoms consistent with lea or psychosis at today's encounter.      Primary Care Provider:  Mine Abarca    Prior Psychiatric Medications:  -Temazepam 30 mg by mouth once daily at bedtime - states she has taken this since around 2014 for cerebral palsy and also sleep  -Zoloft - states was taking 100 mg and she was still having anxiety and mood symptoms  -Effexor XR 75 mg by mouth once daily  -Buspirone 10 mg by mouth twice daily  -Lamictal      Last Menstrual Period:  Uterine ablation on 1/6/21.  Denies any chance of pregnancy.  The patient was educated that her prescribed medications can have potential risk to a  developing fetus. The patient is advised to contact this APRN/this office if she becomes pregnant or plans to become pregnant.  Pt verbalizes understanding and acknowledged agreement with this plan in her own words.          The following portions of the patient's history were reviewed and updated as appropriate: allergies, current medications, past family history, past medical history, past social history, past surgical history and problem list.          Past Medical History:  Past Medical History:   Diagnosis Date   • Anxiety    • Cerebral palsy (HCC)    • Depression    • Lazy eye    • PTSD (post-traumatic stress disorder)    • Seasonal allergies    • Vision decreased        Social History:  Social History     Socioeconomic History   • Marital status:    Tobacco Use   • Smoking status: Current Every Day Smoker     Packs/day: 0.50   • Smokeless tobacco: Never Used   Substance and Sexual Activity   • Alcohol use: Not Currently     Comment: States an occasional social drink, but not often   • Drug use: Never   • Sexual activity: Not Currently     Partners: Male       Family History:  Family History   Problem Relation Age of Onset   • Heart attack Mother    • Stroke Father    • Alcohol abuse Father    • Anxiety disorder Sister    • Depression Sister    • Alcohol abuse Sister    • Breast cancer Other        Past Surgical History:  Past Surgical History:   Procedure Laterality Date   • CATARACT EXTRACTION     • ENDOMETRIAL ABLATION     • LEG SURGERY      bilateral lower extremity surgeries due to cerebral palsy complications       Problem List:  There is no problem list on file for this patient.      Allergy:   No Known Allergies     Current Medications:   Current Outpatient Medications   Medication Sig Dispense Refill   • busPIRone (BUSPAR) 30 MG tablet Take 1 tablet by mouth 2 (Two) Times a Day. 60 tablet 0   • lamoTRIgine (LaMICtal) 150 MG tablet Take 1 tablet by mouth Daily. 30 tablet 0   • venlafaxine XR  (EFFEXOR-XR) 150 MG 24 hr capsule Take 1 capsule by mouth Daily. 30 capsule 0   • ibuprofen (ADVIL,MOTRIN) 800 MG tablet Take 800 mg by mouth 3 (Three) Times a Day.     • Loratadine 10 MG capsule Take 10 mg by mouth Daily.     • Melatonin 10 MG tablet dispersible Place 10 mg on the tongue every night at bedtime.     • methocarbamol (ROBAXIN) 500 MG tablet Take 500 mg by mouth Every 8 (Eight) Hours.     • norethindrone (AYGESTIN) 5 MG tablet Take 5 mg by mouth Daily.     • temazepam (RESTORIL) 30 MG capsule Take 30 mg by mouth At Night As Needed for Sleep.       No current facility-administered medications for this visit.       Review of Symptoms:    Review of Systems   Psychiatric/Behavioral: Positive for decreased concentration, sleep disturbance, depressed mood and stress. Negative for agitation, behavioral problems, dysphoric mood, hallucinations, self-injury, suicidal ideas and negative for hyperactivity. The patient is nervous/anxious.          Physical Exam:   There were no vitals taken for this visit. There is no height or weight on file to calculate BMI.   Due to the remote nature of this encounter (virtual encounter), vitals were unable to be obtained.  Height stated at 65 inches.  Weight stated at around 170 pounds.        Physical Exam  Constitutional:       Appearance: She is well-developed.   Neurological:      Mental Status: She is alert and oriented to person, place, and time.   Psychiatric:         Attention and Perception: Attention normal. She is attentive.         Mood and Affect: Affect normal. Mood is anxious and depressed.         Speech: Speech normal.         Behavior: Behavior normal. Behavior is cooperative.         Thought Content: Thought content normal. Thought content is not paranoid or delusional. Thought content does not include homicidal or suicidal ideation. Thought content does not include homicidal or suicidal plan.         Cognition and Memory: Cognition and memory normal.          Judgment: Judgment normal. Judgment is not impulsive or inappropriate.         Mental Status Exam:   Hygiene:   good  Cooperation:  Cooperative  Eye Contact:  Good  Psychomotor Behavior:  Appropriate  Affect:  Appropriate  Mood: depressed and anxious  Hopelessness: Denies  Speech:  Normal  Thought Process:  Linear  Thought Content:  Mood congruent  Suicidal:  None  Homicidal:  None  Hallucinations:  None  Delusion:  None  Memory:  Intact  Orientation:  Person, Place, Time and Situation  Reliability:  good  Insight:  Good  Judgement:  Good  Impulse Control:  Good  Physical/Medical Issues:  No            Lab Results:   No visits with results within 1 Month(s) from this visit.   Latest known visit with results is:   No results found for any previous visit.         Assessment & Plan   Problems Addressed this Visit    None     Visit Diagnoses     Major depressive disorder, recurrent episode, moderate (HCC)  (Chronic)   -  Primary    Relevant Medications    venlafaxine XR (EFFEXOR-XR) 150 MG 24 hr capsule    lamoTRIgine (LaMICtal) 150 MG tablet    busPIRone (BUSPAR) 30 MG tablet    Anxiety disorder, unspecified type  (Chronic)       Relevant Medications    venlafaxine XR (EFFEXOR-XR) 150 MG 24 hr capsule    busPIRone (BUSPAR) 30 MG tablet    History of posttraumatic stress disorder (PTSD)  (Chronic)       Relevant Medications    venlafaxine XR (EFFEXOR-XR) 150 MG 24 hr capsule    Sleeping difficulties          Diagnoses       Codes Comments    Major depressive disorder, recurrent episode, moderate (HCC)    -  Primary ICD-10-CM: F33.1  ICD-9-CM: 296.32     Anxiety disorder, unspecified type     ICD-10-CM: F41.9  ICD-9-CM: 300.00     History of posttraumatic stress disorder (PTSD)     ICD-10-CM: Z86.59  ICD-9-CM: V11.8     Sleeping difficulties     ICD-10-CM: G47.9  ICD-9-CM: 780.50           Visit Diagnoses:    ICD-10-CM ICD-9-CM   1. Major depressive disorder, recurrent episode, moderate (HCC)  F33.1 296.32   2. Anxiety  disorder, unspecified type  F41.9 300.00   3. History of posttraumatic stress disorder (PTSD)  Z86.59 V11.8   4. Sleeping difficulties  G47.9 780.50          GOALS:  Short Term Goals: Patient will be compliant with medication, and patient will have no significant medication related side effects.  Patient will be engaged in psychotherapy as indicated.  Patient will report subjective improvement of symptoms.  Long term goals: To stabilize mood and treat/improve subjective symptoms, the patient will stay out of the hospital, the patient will be at an optimal level of functioning, and the patient will take all medications as prescribed.  The patient verbalized understanding and agreement with goals that were mutually set.      TREATMENT PLAN: Continue supportive psychotherapy efforts and medications as indicated.  Medication and treatment options, both pharmacological and non-pharmacological treatment options, discussed during today's visit, including any off label use of medication. Patient acknowledged and verbally consented with current treatment plan and was educated on the importance of compliance with treatment and follow-up appointments.      -Continue Effexor  mg by mouth once daily in the mornings for mood.  -Continue buspirone 30 mg by mouth two times daily for mood.   -Continue Lamictal 150 mg by mouth once daily as an adjunct for mood.      MEDICATION ISSUES:  Discussed medication options and treatment plan of prescribed medication, any off label use of medication, as well as the risks, benefits, any black box warnings including increased suicidality, and side effects including but not limited to potential falls, dizziness, possible impaired driving, GI side effects (change in appetite, abdominal discomfort, nausea, vomiting, diarrhea, and/or constipation), dry mouth, somnolence, sedation, insomnia, activation, agitation, irritation, tremors, abnormal muscle movements or disorders, headache, sweating,  possible bruising or rare bleeding, electrolyte and/or fluid abnormalities, change in blood pressure/heart rate/and or heart rhythm, sexual dysfunction, and metabolic adversities among others. Patient and/or guardian agreeable to call the office with any worsening of symptoms or onset of side effects, or if any concerns or questions arise.  The contact information for the office is made available to the patient and/or guardian.  Patient and/or guardian agreeable to call 911 or go to the nearest ER should they begin having any SI/HI, or if any urgent concerns arise. No medication side effects or related complaints today.    This APRN has discussed the benefits and risks of taking/continuing Lamictal (Lamotrigine).  The side effects of Lamictal can include a benign rash, blurred or double vision, dizziness, ataxia, sedation, headache, tremor, insomnia, poor coordination, fatigue,  nausea, vomiting, dyspepsia, rhinitis, infection, pharyngitis, asthenia, a rare but serious rash, rare multi-organ failure associated with Pires-Esteban Syndrome, toxic epidermal necrolysis, drug hypersensitivity syndrome, rare blood dyscrasias, rare aseptic meningitis, rare sudden unexplained deaths in people with epilepsy, withdrawal seizures upon abrupt withdrawal, and rare activation of suicidal ideation and behavior (suicidality).  This APRN has discussed that a very slow dose titration when starting, or changing doses, of Lamictal may reduce the incidence of skin rash and other side effects.  The dosage should not be titrated upwards or increased faster than recommended due to the possibility of the discussed side effects and risk of development of a skin rash (which can become life threatening).    This APRN has also discussed that if the patient stops taking the Lamictal for 3-5 days or longer, it will be necessary to restart the drug with an initial dose titration, as rashes have been reported on reexposure.  If the patient and  Provider decide to stop the Lamictal, the patient will follow the directions of this APRN/this office as a guided taper over about two weeks is appropriate due to the risk of relapse in bipolar disorder with those with a mood or bipolar disorder, the risk of seizures in those with epilepsy, and discontinuation symptoms upon rapid discontinuation of Lamictal.    The patient verbalizes understanding of benefits and risks as discussed, the patient/guardian feels the benefits outweigh the risks and is agreeable to continue/take Lamictal as discussed.  The patient is advised should any side effects or rash develops they are to stop the Lamictal immediately and contact this APRN/this office or go to the emergency department immediately.  The patient verbalizes understanding and agreement with treatment plan in their own words.      SUICIDE RISK ASSESSMENT AND SAFETY PLAN: Unalterable demographics and a history of mental health intervention indicate this patient is in a high risk category compared to the general population. At present, the patient denies active SI/HI, intentions, or plans at this time and agrees to seek immediate care should such thoughts develop. The patient verbalizes understanding of how to access emergency care if needed and agrees to do so. Consideration of suicide risk and protective factors such as history, current presentation, individual strengths and weaknesses, psychosocial and environmental stressors and variables, psychiatric illness and symptoms, medical conditions and pain, took place in this interview. Based on those considerations, the patient is determined: within individual baseline and presenting no imminent risk for suicide or homicide. Other recommendations: The patient does not meet the criteria for inpatient admission and is not a safety risk to self or others at today's visit. Inpatient treatment offers no significant advantages over outpatient treatment for this patient at today's  visit.  The patient was given ample time for questions and fully participated in treatment planning.  The patient was encouraged to call the clinic with any questions or concerns.  The patient was informed of access to emergency care. If patient were to develop any significant symptomatology, suicidal ideation, homicidal ideation, any concerns, or feel unsafe at any time they are to call the clinic and if unable to get immediate assistance should immediately call 911 or go to the nearest emergency room.  Patient contracted verbally for the following: If you are experiencing an emotional crisis or have thoughts of harming yourself or others, please go to your nearest local emergency room or call 911. Will continue to re-assess medication response and side effects frequently to establish efficacy and ensure safety. Risks, any black box warnings, side effects, off label usage, and benefits of medication and treatment discussed with patient, along with potential adverse side effects of current and/or newly prescribed medication, alternative treatment options, and OTC medications.  Patient verbalized understanding of potential risks, any off label use of medication, any black box warnings, and any side effects in their own words. The patient verbalized understanding and agreed to comply with the safety plan discussed in their own words.  Patient given the number to the office. Number also discussed of the 24- hour suicide hotline.           MEDS ORDERED DURING VISIT:  New Medications Ordered This Visit   Medications   • venlafaxine XR (EFFEXOR-XR) 150 MG 24 hr capsule     Sig: Take 1 capsule by mouth Daily.     Dispense:  30 capsule     Refill:  0   • lamoTRIgine (LaMICtal) 150 MG tablet     Sig: Take 1 tablet by mouth Daily.     Dispense:  30 tablet     Refill:  0   • busPIRone (BUSPAR) 30 MG tablet     Sig: Take 1 tablet by mouth 2 (Two) Times a Day.     Dispense:  60 tablet     Refill:  0     Return in about 4 weeks  (around 10/11/2022), or if symptoms worsen or fail to improve, for Next scheduled follow up and Recheck.         Progress toward goal: Not at goal    Functional Status: Moderate impairment     Prognosis: Fair with Ongoing Treatment      Treatment plan completed: 2/25/21.            This document has been electronically signed by GISELA Vaughn  September 13, 2022 09:57 EDT    Some of the data in this electronic note has been brought forward from a previous encounter, any necessary changes have been made, it has been reviewed by this APRN, and it is accurate.    Please note that portions of this note were completed with a voice recognition program.        AF ablation

## 2023-12-07 NOTE — DISCHARGE NOTE NURSING/CASE MANAGEMENT/SOCIAL WORK - CAREGIVER NAME
Discussed results with pt, agrees with Dr Solis's recommendation. Will f/u in 3-4 as planned. No other questions or concerns.    Tamiko

## 2023-12-07 NOTE — PROGRESS NOTE ADULT - ASSESSMENT
A/P  Patient s/p RF AFIB and AFL ablation with Dr. Martinez on 12/6/2023. Patient tolerated the procedure well.   Venessa is doing well.  - continue Eliquis 5 mg BID uninterruptedly as well as the rest of home medications including Amiodarone 200 mg daily  - start Pepcid 20 mg daily x 30 days for prophylaxis post RF ablation  - No heavy lifting 9r strenuous activity x 1 week  - Shower is ok, no submerging under the water x 2 weeks  - No driving x 3 days  - FU in office in 3-4 weeks

## 2023-12-07 NOTE — DISCHARGE NOTE PROVIDER - ATTENDING DISCHARGE PHYSICAL EXAMINATION:
Applied
Bilateral femoral venous access sites are clean, dry, and intact with no hematoma or tenderness.

## 2023-12-07 NOTE — DISCHARGE NOTE PROVIDER - NSDCFUSCHEDAPPT_GEN_ALL_CORE_FT
[facility]  [facility] [address]  Scheduled Appointment: 01/22/2024     [facility]  [facility] [address]  Scheduled Appointment: 01/22/2024

## 2023-12-07 NOTE — PROGRESS NOTE ADULT - SUBJECTIVE AND OBJECTIVE BOX
INTERVAL HPI/OVERNIGHT EVENTS:    Patient s/p RF AFIB and AFL ablation with Dr. Martinez on 12/6/2023. Patient tolerated the procedure well.   No evetns overnight  Patietn in NSR.    MEDICATIONS  (STANDING):  aMIOdarone    Tablet 200 milliGRAM(s) Oral daily  apixaban 5 milliGRAM(s) Oral every 12 hours  famotidine    Tablet 40 milliGRAM(s) Oral daily  famotidine Injectable 20 milliGRAM(s) IV Push once  influenza  Vaccine (HIGH DOSE) 0.7 milliLiter(s) IntraMuscular once  lisinopril 5 milliGRAM(s) Oral daily  metoprolol succinate  milliGRAM(s) Oral daily  sertraline 25 milliGRAM(s) Oral daily    MEDICATIONS  (PRN):  acetaminophen     Tablet .. 650 milliGRAM(s) Oral every 6 hours PRN Mild Pain (1 - 3)  melatonin 5 milliGRAM(s) Oral at bedtime PRN Insomnia    Allergies  No Known Allergies    Intolerances    Vital Signs Last 24 Hrs  T(C): 36.7 (07 Dec 2023 07:00), Max: 36.8 (06 Dec 2023 23:56)  T(F): 98 (07 Dec 2023 07:00), Max: 98.3 (06 Dec 2023 23:56)  HR: 74 (07 Dec 2023 09:05) (64 - 75)  BP: 103/51 (07 Dec 2023 09:05) (88/39 - 122/58)  BP(mean): 73 (07 Dec 2023 09:05) (43 - 84)  RR: 20 (07 Dec 2023 09:05) (14 - 24)  SpO2: 94% (07 Dec 2023 04:39) (94% - 99%)    Parameters below as of 07 Dec 2023 07:00  Patient On (Oxygen Delivery Method): room air    HEENT STARR EOMI  Lung CTAB  Heart RRR normal S1 S2  abd +BS soft  groins No hematoma, no bleeding; intravascular closure  Ext no C/C/E    LABS:  PT/INR - ( 06 Dec 2023 07:36 )   PT: 12.10 sec;   INR: 1.06 ratio    PTT - ( 06 Dec 2023 07:36 )  PTT:37.8 sec

## 2023-12-07 NOTE — DISCHARGE NOTE PROVIDER - NSDCMRMEDTOKEN_GEN_ALL_CORE_FT
amiodarone 200 mg oral tablet: 1 tab(s) orally once a day  Eliquis 5 mg oral tablet: 1 tab(s) orally 2 times a day  lisinopril 5 mg oral tablet: 1 tab(s) orally once a day  metoprolol succinate 100 mg oral tablet, extended release: 1 tab(s) orally once a day  sertraline 25 mg oral tablet: 1 tab(s) orally once a day   amiodarone 200 mg oral tablet: 1 tab(s) orally once a day  Eliquis 5 mg oral tablet: 1 tab(s) orally 2 times a day  famotidine 40 mg oral tablet: 1 tab(s) orally once a day  lisinopril 5 mg oral tablet: 1 tab(s) orally once a day  metoprolol succinate 100 mg oral tablet, extended release: 1 tab(s) orally once a day  sertraline 25 mg oral tablet: 1 tab(s) orally once a day

## 2023-12-09 DIAGNOSIS — I11.0 HYPERTENSIVE HEART DISEASE WITH HEART FAILURE: ICD-10-CM

## 2023-12-09 DIAGNOSIS — I42.9 CARDIOMYOPATHY, UNSPECIFIED: ICD-10-CM

## 2023-12-09 DIAGNOSIS — I50.22 CHRONIC SYSTOLIC (CONGESTIVE) HEART FAILURE: ICD-10-CM

## 2023-12-09 DIAGNOSIS — Z92.21 PERSONAL HISTORY OF ANTINEOPLASTIC CHEMOTHERAPY: ICD-10-CM

## 2023-12-09 DIAGNOSIS — Z87.891 PERSONAL HISTORY OF NICOTINE DEPENDENCE: ICD-10-CM

## 2023-12-09 DIAGNOSIS — I48.19 OTHER PERSISTENT ATRIAL FIBRILLATION: ICD-10-CM

## 2023-12-09 DIAGNOSIS — I44.7 LEFT BUNDLE-BRANCH BLOCK, UNSPECIFIED: ICD-10-CM

## 2023-12-09 DIAGNOSIS — Z85.118 PERSONAL HISTORY OF OTHER MALIGNANT NEOPLASM OF BRONCHUS AND LUNG: ICD-10-CM

## 2024-01-16 NOTE — CHART NOTE - NSCHARTNOTESELECT_GEN_ALL_CORE
Brief Procedure EP report
CHF CLINICAL STAFF DOCUMENTATION    Current complaints/issues:   Education provided: Able to obtain medications without difficulty?- Yes- Patient denies difficulty in obtaining or taking medications. Advised not to stop taking a medication without notifying provider.    Reviewed Heart failure patient education book with patient. Heart failure education included: Type of heart failure, How it is diagnosed, causes, symptoms, medications, diet, daily weights, exercise and fluid control. Recommendation for Mediterranean or DASH diet made.  Questions answered.      Patient's heart failure medications reviewed and information given on each.     Reviewed the Stop Light Handout with patient and instructed when to call his provider.   Patient was engaged and attentive during education session.    The following handouts were reviewed with patient and patient was given a copy of the following : Heart Failure education booklet, the 'Stop Light' Handout,  a link to the [employer]'s Healthier Living with Heart Failure Interactive workbook    Have you had any Chest Pain? - No    Do you had any Shortness of Breath - No  If Yes - When none    Have you had any dizziness - No  When do you feel dizzy none   How long does it last .none  none     Do you have any edema -   slight in feet  swelling in feet ankles legs      Are you on fluid restrictions -  no instruction      Amount - 64 oz     Are you on sodium restrictions - No instruction yet   Amount - 2 gm    Extra diuretic use -  No     Do you weigh yourself daily? -  unable to weigh- does not have W/C scale    Do you feel fatigued - No    Do you have a cough - No    Lung sounds -    Normal - Yes   Abnormal -     Do you have abdominal bloating - No    How is your appetite - good    Do you have difficulty sleeping - Yes sleeps propped up   Able to lie flat? - Yes    Do you have a history of sleep apnea - No  CPAP no    6 min. Walk:  wheelchair bound - unable   Pre heart 
Pre-Procedure EP note
Cigarettes     Start date: 1984     Quit date: 2012     Years since quittin.4    Smokeless tobacco: Never    Tobacco comments:     I don't really don't remember when I started. Somewhere after High School.   Substance Use Topics    Alcohol use: Never     Comment: caffeine: occ coke     Current Outpatient Medications   Medication Sig Dispense Refill    empagliflozin (JARDIANCE) 10 MG tablet Take 1 tablet by mouth daily 14 tablet 0    lisinopril (PRINIVIL;ZESTRIL) 10 MG tablet Take 1 tablet by mouth daily 90 tablet 1    metoprolol succinate (TOPROL XL) 25 MG extended release tablet Take 1 tablet by mouth daily 30 tablet 3    spironolactone (ALDACTONE) 25 MG tablet Take 1 tablet by mouth daily 30 tablet 5    empagliflozin (JARDIANCE) 10 MG tablet Take 1 tablet by mouth daily 30 tablet 5    abiraterone acetate (ZYTIGA) 250 MG tablet Take 4 tablets by mouth daily 120 tablet 3    predniSONE (DELTASONE) 5 MG tablet TAKE 1 TABLET BY MOUTH DAILY 30 tablet 12    Leuprolide Acetate (LUPRON IJ) Inject as directed Indications: pt reports every 4 months 23 Patient reports he is due in October      calcium carbonate (OSCAL) 500 MG TABS tablet Take 3 tablets by mouth every other day      zoledronic acid (ZOMETA) 4 MG/5ML injection Infuse 5 mLs intravenously every 6 months 23 patient reports he is due in January      triamterene-hydroCHLOROthiazide (MAXZIDE-25) 37.5-25 MG per tablet Take 1 tablet by mouth daily as needed (feet swelling) (Patient not taking: Reported on 2024) 30 tablet 2     No current facility-administered medications for this visit.     Allergies   Allergen Reactions    Bupropion     Enalapril Other (See Comments)     Took taste away    Prednisone Hives       SUBJECTIVE:     Review of Systems   Constitutional:  Negative for fatigue and fever.   Respiratory:  Negative for cough and shortness of breath.    Cardiovascular:  Positive for leg swelling. Negative for chest pain and

## 2024-01-22 ENCOUNTER — APPOINTMENT (OUTPATIENT)
Dept: ELECTROPHYSIOLOGY | Facility: CLINIC | Age: 79
End: 2024-01-22
Payer: MEDICARE

## 2024-01-22 VITALS
DIASTOLIC BLOOD PRESSURE: 56 MMHG | BODY MASS INDEX: 20.66 KG/M2 | TEMPERATURE: 98 F | HEIGHT: 65 IN | WEIGHT: 124 LBS | SYSTOLIC BLOOD PRESSURE: 103 MMHG

## 2024-01-22 VITALS — HEART RATE: 60 BPM

## 2024-01-22 PROCEDURE — 93000 ELECTROCARDIOGRAM COMPLETE: CPT

## 2024-01-22 PROCEDURE — 99214 OFFICE O/P EST MOD 30 MIN: CPT | Mod: 25

## 2024-01-30 NOTE — CARDIOLOGY SUMMARY
[de-identified] : (1/22/2024): Sinus rhythm at 60 bpm, LBBB, no specific ST/T wave abnormalities.  ms.  ms. (10/16/2023): Sinus rhythm at 62 bpm, LBBB, no specific ST/T wave abnormalities.  ms.  ms. (06/19/2023): Sinus rhythm at 55 bpm, LBBB, no specific ST/T wave abnormalities.  ms.  ms. [de-identified] : (09/20/2023) 2D echo:  1. Left ventricular systolic function is normal with an ejection fraction visually estimated at 60-65 %. 2. Analysis of left ventricular diastolic function and filling pressure is made challenging by the presence of mitral annuloplasty ring. 3. The left atrium is severely dilated in size. 4. The right atrium is severely dilated in size. 5. An annuloplasty ring is noted in the mitral position. 6. Mild mitral regurgitation. 7. Mild-moderate tricuspid regurgitation. 8. Estimated pulmonary artery systolic pressure is 42 mmHg, consistent with mild pulmonary hypertension.  (05/22/2023): 2D echo. EF 25-30%. Severely decreased global LV systolic function. Severe LAE. 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 Mild to moderate TR.   [de-identified] : 12/6/2023: RF ablation of AF/AFL: -Successful radiofrequency ablation of Atrial Fibrillation in the form of pulmonary veins isolation with entrance and exit block. -Successful posterior wall isolation with entrance and exit block.  -Successful radiofrequency ablation of cavotricuspid isthmus dependent flutter. -Scar homogenization in lateral mid RA to treat atypical right atrial flutter. -Presence of extensive scarring and remodeling of right and left atrium prior to ablation.  (05/22/2023): AMANDEEP cardioversion.

## 2024-01-30 NOTE — DISCUSSION/SUMMARY
[FreeTextEntry1] : MS. 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 Patient underwent AMANDEEP/DCCV on 5/23/2023. She was started on Amiodarone (TSH 5.2 on 5/23/2023). EF improved in sinus rhythm. She underwent on 12/6/2023: RF ablation of AF/AFL: -Successful radiofrequency ablation of Atrial Fibrillation in the form of pulmonary veins isolation with entrance and exit block. -Successful posterior wall isolation with entrance and exit block.  -Successful radiofrequency ablation of cavotricuspid isthmus dependent flutter. -Scar homogenization in lateral mid RA to treat atypical right atrial flutter. -Presence of extensive scarring and remodeling of right and left atrium prior to ablation.  Patient is on GDMT. I recommend continuing Metoprolol and Lisinopril and optimize dosage.   I recommend to lower Amiodarone to 100 mg daily. I recommend to check TFTs and LFTs.  Patient has no arrhythmias since the catheter ablation. There are no groin hematomas or bleeding. Patient is pleased with results of catheter ablation although is aware with the risk of recurrent symptoms and need for another ablation. Post ablation care was discussed in great length. I discussed with patient contacting me in case of any symptoms suggestive of recurrence.  I discussed with patient plan of care in great details. I answered all her questions to her satisfaction. Patient was pleased with the visit.  Patient will follow with me in 4-6 months' time. Please do not hesitate to contact me at [phone] if you have any further questions regarding this patient care.  [EKG obtained to assist in diagnosis and management of assessed problem(s)] : EKG obtained to assist in diagnosis and management of assessed problem(s)

## 2024-01-30 NOTE — HISTORY OF PRESENT ILLNESS
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 remains in sinus on Amio. EF improved.   12/6/2023: RF ablation of AF/AFL: -Successful radiofrequency ablation of Atrial Fibrillation in the form of pulmonary veins isolation with entrance and exit block. -Successful posterior wall isolation with entrance and exit block.  -Successful radiofrequency ablation of cavotricuspid isthmus dependent flutter. -Scar homogenization in lateral mid RA to treat atypical right atrial flutter. -Presence of extensive scarring and remodeling of right and left atrium prior to ablation.  1/22/2024: presents in sinus rhythm. She has no chest pain, no shortness of breath, mild dyspnea on exertion, no orthopnea, no PND. She denies dizziness, lightheadedness and syncope. She has mild exertional symptoms. She presents for evaluation.

## 2024-01-30 NOTE — REASON FOR VISIT
[Arrhythmia/ECG Abnorrmalities] : arrhythmia/ECG abnormalities [FreeTextEntry3] : Dr. Santy Schuler - Dr. Fernanda Driscoll

## 2024-02-26 ENCOUNTER — RX RENEWAL (OUTPATIENT)
Age: 79
End: 2024-02-26

## 2024-02-26 ENCOUNTER — OUTPATIENT (OUTPATIENT)
Dept: OUTPATIENT SERVICES | Facility: HOSPITAL | Age: 79
LOS: 1 days | End: 2024-02-26
Payer: MEDICARE

## 2024-02-26 ENCOUNTER — APPOINTMENT (OUTPATIENT)
Dept: HEMATOLOGY ONCOLOGY | Facility: CLINIC | Age: 79
End: 2024-02-26
Payer: MEDICARE

## 2024-02-26 VITALS
SYSTOLIC BLOOD PRESSURE: 109 MMHG | BODY MASS INDEX: 21.16 KG/M2 | RESPIRATION RATE: 14 BRPM | TEMPERATURE: 98 F | HEIGHT: 65 IN | HEART RATE: 69 BPM | WEIGHT: 127 LBS | DIASTOLIC BLOOD PRESSURE: 48 MMHG

## 2024-02-26 DIAGNOSIS — Z90.2 ACQUIRED ABSENCE OF LUNG [PART OF]: Chronic | ICD-10-CM

## 2024-02-26 DIAGNOSIS — C34.90 MALIGNANT NEOPLASM OF UNSPECIFIED PART OF UNSPECIFIED BRONCHUS OR LUNG: ICD-10-CM

## 2024-02-26 DIAGNOSIS — Z92.21 PERSONAL HISTORY OF ANTINEOPLASTIC CHEMOTHERAPY: Chronic | ICD-10-CM

## 2024-02-26 DIAGNOSIS — Z92.3 PERSONAL HISTORY OF IRRADIATION: Chronic | ICD-10-CM

## 2024-02-26 DIAGNOSIS — Z98.890 OTHER SPECIFIED POSTPROCEDURAL STATES: Chronic | ICD-10-CM

## 2024-02-26 PROCEDURE — 99213 OFFICE O/P EST LOW 20 MIN: CPT

## 2024-02-26 NOTE — HISTORY OF PRESENT ILLNESS
[de-identified] : Marie is a luc, fully functional 71 yo lady referred for evaluation of NSCCL by Dr. Smith. \par  Marie initially presented for evaluation of progressive SOB and was noted to have severe mitral valve regurgitation. During her evaluation at the time CT for evaluation of coronary artery arthrosclerosis on 8/10/2017 revealed 1. Left upper lobe spiculated 2.8 cm pulmonary nodules suspicious for neoplasm.    2. Right hepatic lobe 2.2 cm hypoattenuating lesion, not fully characterized.  Outpatient, nonemergent evaluation with MRI abdomen with and without IV contrast  recommended.    3. Additional bilateral pulmonary nodules measuring up to 0.4 cm.    4. Centrilobular emphysema.    5. Aortic calcifications. \par  Follow up PET CT on 8/28/2017 revealed  abnormal FDG uptake within a spiculated 2.8 cm left upper lobe nodule, max SUV  5.5 consistent with biologic active FDG avid neoplasm.    No other sites of abnormal FDG uptake.    Non-FDG avid right hepatic lobe 2.2 cm hypoattenuating lesion, not fully  characterized. Further evaluation with MRI abdomen with and without IV contrast recommended.\par  She then underwent successful mitral valve repair by Dr. Youssef on 9/14/2017 and on 12/8/2017 left apical segmentectomy and mediastinal node dissection by Dr. Smith. \par  Pathology revealed a LUIS ARMANDO adenocarcinoma, acinar predominant, micropapillary predominant, poorly differentiated, 3.6cm is greatest dimension, without pleural invasion, LVI, with negative margins = pT2a, 4 nodes (level 5, level 12) out of 15 examined were positive for adenocarcinoma =pN2.\par  Therefore this is at least IIIa ([id number]) disease with N2 involvement. \par  She is also managed for A. fib with amiodarone and full ASA, Coumadin has been discontinued.  [de-identified] : 12/8/2017: Daksha has recovered well from surgery. She only feels "winded" with climbing steps, she went back to work in [place]. She is not requiring oxygen at home. She reports no pain. With her 2 surgeries she has lost minimal amount of weight. She feels well. Reports no new bony pains, no neurological symptoms, no GI or  symptoms.  We have at length discussed the risks and benefits of adjuvant chemotherapy in the setting of stage IIIa disease with N2 mansoor involvement.  Marie is reluctant to receive cisplatin based therapy given the risks of permanent renal failure and ototoxicity we have discussed.  We have discussed the carboplatin backbone with Alimta regiment, followed by consideration of adjuvant radiation therapy to the mediastinum. We have also briefly discussed potential eligibility for the Alchemist trial.  However given Marie's last PET CT was 4 months ago, I will offer restaging scans including CT C/A/P and MRI or the brain prior to initiation of adjuvant therapy.   1/16/2018: Marie had CT C/A/P and MRI of the brain done, both did not reveal recurrent disease. There are 2 small pulmonary nodules, which will warrant continued follow up.  These findings were discussed. She is taking folic acid and has already received vitamin B12 injection. She wishes to try to continue to work during chemotherapy.  2/9/2018: Marie is s/p 1st dose of C on Carbo/Alimta on 1/18/2018, she did not come in for Neulasta, her counts are adequate today. Will hold Neulasta for this cycle. Marie is taking folic acid every day and continues to work. She was educated regarding PO Decadron post chemotherapy.   3/2/2018: Marie is doing really well today. She continues to work, she had few episodes of nausea, no vomiting, no mouth sores, no neuropathy. For cycle 3 today, will administer on body Neulasta and B12 today.   3/23/2018: No complaints, she is tolerating chemotherapy very well. Will consider Alchemist trial participation.   4/20/2018: Marie has completed 4 cycles of adjuvant Carbo/Alimta on 3/23/2018. She has tolerated chemotherapy without any difficulty. She did not hear from Rad Onc yet and will be referred today, case was discussed with Dr. Juarez. She also may be interested in Alchemist trial participation, consent was provided for her review.   5/29/2018: Marie is doing great, she was awaiting restaging scans prior to starting on XRT. These were just completed, results were reviewed today. MRI of the liver on 5/26/2018 revealed 2.1 cm right hepatic lobe hemangioma. No suspicious liver lesions. PET CT on 5/25/2018 revealed COMPARISON : PET CT 8/28/2017  Status post left apical segmentectomy and mediastinal lymph node  dissection (11/16/2018). No abnormal FDG uptake to suggest  residual/recurrent biologic tumor activity. She was referred to f/u with radiation ASAP. We have also briefly discussed the Alchemist trial participation.   8/22/2018: Marie has completed 25 treatments of radiation to the chest on 7/27/2018, she has tolerated radiation well, reports some radiation induced esophagitis, that has now resoled. She continues to work and overall feels well. I have briefly discussed with her Alchemist trial participation, she reports she is very emotionally exhausted from dealing with cancer and for this reason may decline. She understands her odds of recurrence in the setting of N2 disease are quite high.   4/9/2019: Marie feels well and denies any complaints today, she has gained some weight and continues to work. Denies any new pain SOB.   5/7/19 : Marie came for a follow up visit, she reports feeling well and offers no complaints at all. CT chest from 4/23/19 with stable result was reviewed with patient, images personally reviewed by John. Marie denied SOB CT chest revealed on 4/23/2019 1. Stable findings of bronchiectasis and scarring in left upper lung, related to previous surgery.  2. Decreased loculated left pleural effusion.  3. No new lung findings Marie also had an Echo which was mostly normal, she was advised to further discuss it with her cardiologist.   9/10/2019: Marie continues to feel well, she continues to work and denies any complaints.   11/19/19 : Patient came for a follow up visit, she reports feeling well. she works full time. She denies any complaints.  We communicated results from CT chest done on 9/22/19, that revealed  1. No evidence for intrathoracic metastatic or recurrent malignant disease.  2. Stable postoperative changes, as above.  She will remain on observation , we communicated CBC from 11/19/19 with normal Hemogram.  Images were personally reviewed by MD John and discussed with patient.  She is due for 6 month follow up CT of her chest in 3/2020, this was ordered today.  7/21/2020 Marie is a 74 years old female came for  a follow up visit for Stage IIIa, N2 positive adenocarcinoma of the lung. She reports feeling well. She didn't attend to work since 3/21/20, 2/2 pandemic.  CT chest on 4/30/20 revealed  Posttreatment changes within the left lung without evidence for recurrence.  No new suspicious mass or nodule.  Images were personally reviewed by MD John and discussed with patient.  Patient is due for another CT chest  in 11/2020, script was provided on 7/21/2020.   3/23/22 Patient is here for a follow-up visit for NSCLC.  She is transitioning care from another provider previously known to the clinic.  She is feeling well with no new complaints.  No recent labwork or imaging available for review.  Patient denies fever, chills, nausea, vomiting, new cough, unintentional weight loss, dyspnea or bleeding.  3/23/22 she is here for follow up. She feels well and has no new complaints . On occasion she has dyspnea on exertion which is chronic.   10/07/2022 Reports feeling well, no changes in chronic conditions or new complaints since the last visit.  2/26/2024 she is here for follow up.  She has a cardiomyopathy, HFrEF 25-30% with LBBB, AF s/p AMANDEEP/CV on 5/22/2023, MR s/p Repair and KOBY closure (2017) , 12/6/2023: RF ablation of AF/AFL:   She had CT angio on 5/13/2023 IMPRESSION:  1. Small bilateral pleural effusions. Lung groundglass opacities, likely representing pulmonary edema. . Cardiomegaly. Correlation for CHF recommended.  2. No evidence of acute pulmonary embolism.  She had blood work done in November 2023 hemoglobin is 11.8 MCV was normal platelets are normal white cell count is normal.  She remains ambulatory she can take stairs she has 3 flights in her house although does take a break no edema on examination today she essentially has no symptoms has no orthopnea either

## 2024-02-26 NOTE — PHYSICAL EXAM
[Restricted in physically strenuous activity but ambulatory and able to carry out work of a light or sedentary nature] : Status 1- Restricted in physically strenuous activity but ambulatory and able to carry out work of a light or sedentary nature, e.g., light house work, office work [Thin] : thin [Normal] : affect appropriate [de-identified] : She has decraese sound in bases  [de-identified] : Sternal scar

## 2024-02-26 NOTE — ASSESSMENT
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 - 01/18-03/23/2018 s/p Adjuvant Carbo/Alimta - tolerated 4 cycles without any difficulty, with on body Neulasta. - she takes Vit B12, folic acid supplementation  - 07/27/2018 completed 25 treatments adjuvant radiation to mediastinum given N2 disease. - 05/25/2018 PET CT - ROSEMARIE. - 05/26/2018 MRI liver revealed hemangioma, no suspicious liver masses. - Patient declined Alchemist trial participation, consent provided 04/20/2018. - 04/07/2022 CT CHEST - Since 12/2/2020. Interval growth in groundglass nodules posterior right lower lobe currently measuring approximately 2.4 x 1.0 cm, previously 1.3 x 1.0 cm. Follow-up CT scan 6 months. Post left upper lobe wedge resection with stable posttreatment changes left lung.  # Thrombosis in the L atrial appendage is an expected finding post valve surgery, this was discussed with CT surgery, who reviewed op report, confirming that LA appendage was in fact closed intraop and thrombosis is expected. - Echo essentially normal, follows with cardiology. On apixiban   HCM No longer smokes. She is not intrusted in any mammograms or colposcopy at this point. She is aware of benefit.  PLAN:  - repeat CT chest  in May 2024 if ROSEMARIE can come back in May of 2025, she will be 79 and has extensive heart disease she will decide if wishes to proceed with any further monitoring than    RTC in May 2025 if CTs are rosemarie

## 2024-02-26 NOTE — REVIEW OF SYSTEMS
[Loss of Hearing] : loss of hearing [Negative] : Allergic/Immunologic [Fever] : no fever [Chills] : no chills [Night Sweats] : no night sweats [Fatigue] : no fatigue [Recent Change In Weight] : ~T no recent weight change [Shortness Of Breath] : no shortness of breath [Wheezing] : no wheezing [Cough] : no cough [SOB on Exertion] : no shortness of breath during exertion [Incontinence] : no incontinence

## 2024-02-27 DIAGNOSIS — C34.90 MALIGNANT NEOPLASM OF UNSPECIFIED PART OF UNSPECIFIED BRONCHUS OR LUNG: ICD-10-CM

## 2024-02-29 ENCOUNTER — RX RENEWAL (OUTPATIENT)
Age: 79
End: 2024-02-29

## 2024-03-20 NOTE — PATIENT PROFILE ADULT - FUNCTIONAL SCREEN CURRENT LEVEL: COMMUNICATION, MLM
Render Note In Bullet Format When Appropriate: No Duration Of Freeze Thaw-Cycle (Seconds): 0 Show Applicator Variable?: Yes Post-Care Instructions: I reviewed with the patient in detail post-care instructions. Patient is to wear sunprotection, and avoid picking at any of the treated lesions. Pt may apply Vaseline to crusted or scabbing areas. Detail Level: Detailed Consent: The patient's consent was obtained including but not limited to risks of crusting, scabbing, blistering, scarring, darker or lighter pigmentary change, recurrence, incomplete removal and infection. (1) age 41-60 years 0 = understands/communicates without difficulty

## 2024-03-28 ENCOUNTER — APPOINTMENT (OUTPATIENT)
Dept: CARDIOLOGY | Facility: CLINIC | Age: 79
End: 2024-03-28
Payer: MEDICARE

## 2024-03-28 VITALS
WEIGHT: 126 LBS | BODY MASS INDEX: 20.97 KG/M2 | HEART RATE: 73 BPM | DIASTOLIC BLOOD PRESSURE: 70 MMHG | SYSTOLIC BLOOD PRESSURE: 116 MMHG

## 2024-03-28 PROCEDURE — 93000 ELECTROCARDIOGRAM COMPLETE: CPT

## 2024-03-28 PROCEDURE — 99214 OFFICE O/P EST MOD 30 MIN: CPT | Mod: 25

## 2024-03-28 NOTE — DISCUSSION/SUMMARY
[FreeTextEntry1] : HFimpEF >55% with LBBB AF s/p AMANDEEP/CV on 5/22/2023 MR s/p MV repair and KOBY closure (2017)  SR today BP is controlled   Continue Amiodarone 100 mg daily Continue Metoprolol succinate 100 mg daily Continue Lisinopril 5 mg Continue Eliquis EP follow-up in June Follow-up 6 months

## 2024-03-28 NOTE — HISTORY OF PRESENT ILLNESS
[FreeTextEntry1] : Establish care after discharge. Records reviewed. Former patient of Dr. Sanderson.  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  Doing well.  No CP, SOB, palpitations, orthopnea/PND, dizziness or syncope.   TTE (9/2023):  EF >55%, mitral annuloplasty ring, Mild MR, RVSP 42  6/2023 TSH  14

## 2024-03-28 NOTE — PHYSICAL EXAM
[No Acute Distress] : no acute distress [Well Developed] : well developed [Normal Conjunctiva] : normal conjunctiva [No Carotid Bruit] : no carotid bruit [Normal S1, S2] : normal S1, S2 [No Rub] : no rub [No Murmur] : no murmur [Clear Lung Fields] : clear lung fields [No Respiratory Distress] : no respiratory distress  [Good Air Entry] : good air entry [Soft] : abdomen soft [Non Tender] : non-tender [Normal Gait] : normal gait [No Edema] : no edema [No Cyanosis] : no cyanosis [No Rash] : no rash [No Clubbing] : no clubbing [No Skin Lesions] : no skin lesions [No Focal Deficits] : no focal deficits [Moves all extremities] : moves all extremities [Alert and Oriented] : alert and oriented

## 2024-04-10 NOTE — ASU PATIENT PROFILE, ADULT - NS PRO ABUSE SCREEN SUSPICION NEGLECT YN
Artem Darling is a 4 y.o. female who presents for Rash.  Today she is accompanied by her father who presents much of the history.     Chano Izaguirre has had sore throat over the last 1 to 2 days.  She now has a rash on her cheeks.  Her sibling had strep throat this week.  No fever, no nasal congestion, no cough.  No rash on her body other than her cheeks.  Objective   Temp 36.9 °C (98.5 °F)   Wt 18.6 kg     Physical Exam  Constitutional:       Appearance: Normal appearance.   HENT:      Right Ear: Tympanic membrane normal.      Left Ear: Tympanic membrane normal.      Nose: Nose normal.      Mouth/Throat:      Mouth: Mucous membranes are moist.   Eyes:      Conjunctiva/sclera: Conjunctivae normal.   Cardiovascular:      Rate and Rhythm: Normal rate and regular rhythm.      Heart sounds: Normal heart sounds.   Pulmonary:      Effort: Pulmonary effort is normal.      Breath sounds: Normal breath sounds.   Abdominal:      General: Bowel sounds are normal.      Tenderness: There is no abdominal tenderness.   Musculoskeletal:      Cervical back: Normal range of motion and neck supple.   Skin:     Comments: Erythematous blanching patches on cheeks         Assessment/Plan   Artem has a suspected viral illness.  Rapid strep testing was negative in our office, and a confirmatory strep pcr test was sent.  Today we discussed a typical course of illness, symptomatic treatment, and signs of worsening/when to seek medical care.     no

## 2024-06-03 ENCOUNTER — APPOINTMENT (OUTPATIENT)
Dept: ELECTROPHYSIOLOGY | Facility: CLINIC | Age: 79
End: 2024-06-03
Payer: MEDICARE

## 2024-06-03 VITALS
HEIGHT: 65 IN | DIASTOLIC BLOOD PRESSURE: 48 MMHG | WEIGHT: 125 LBS | BODY MASS INDEX: 20.83 KG/M2 | SYSTOLIC BLOOD PRESSURE: 85 MMHG | HEART RATE: 73 BPM | TEMPERATURE: 98 F

## 2024-06-03 DIAGNOSIS — I48.19 OTHER PERSISTENT ATRIAL FIBRILLATION: ICD-10-CM

## 2024-06-03 DIAGNOSIS — I42.9 CARDIOMYOPATHY, UNSPECIFIED: ICD-10-CM

## 2024-06-03 DIAGNOSIS — I10 ESSENTIAL (PRIMARY) HYPERTENSION: ICD-10-CM

## 2024-06-03 PROCEDURE — 99215 OFFICE O/P EST HI 40 MIN: CPT | Mod: 25

## 2024-06-03 PROCEDURE — 93000 ELECTROCARDIOGRAM COMPLETE: CPT

## 2024-06-03 RX ORDER — APIXABAN 2.5 MG/1
2.5 TABLET, FILM COATED ORAL
Qty: 180 | Refills: 1 | Status: ACTIVE | COMMUNITY
Start: 1900-01-01 | End: 1900-01-01

## 2024-06-03 RX ORDER — AMIODARONE HYDROCHLORIDE 100 MG/1
100 TABLET ORAL DAILY
Qty: 90 | Refills: 1 | Status: DISCONTINUED | COMMUNITY
Start: 2017-10-02 | End: 2024-06-03

## 2024-06-03 RX ORDER — METOPROLOL SUCCINATE 100 MG/1
100 TABLET, EXTENDED RELEASE ORAL DAILY
Qty: 90 | Refills: 3 | Status: ACTIVE | COMMUNITY

## 2024-06-03 RX ORDER — LISINOPRIL 2.5 MG/1
2.5 TABLET ORAL DAILY
Qty: 90 | Refills: 1 | Status: ACTIVE | COMMUNITY
Start: 1900-01-01 | End: 1900-01-01

## 2024-06-03 NOTE — CARDIOLOGY SUMMARY
[de-identified] : (6/3/2024): Sinus rhythm at 73 bpm, LBBB, no specific ST/T wave abnormalities.  ms.  ms. (1/22/2024): Sinus rhythm at 60 bpm, LBBB, no specific ST/T wave abnormalities.  ms.  ms. (10/16/2023): Sinus rhythm at 62 bpm, LBBB, no specific ST/T wave abnormalities.  ms.  ms. (06/19/2023): Sinus rhythm at 55 bpm, LBBB, no specific ST/T wave abnormalities.  ms.  ms. [de-identified] : (09/20/2023) 2D echo:  1. Left ventricular systolic function is normal with an ejection fraction visually estimated at 60-65 %. 2. Analysis of left ventricular diastolic function and filling pressure is made challenging by the presence of mitral annuloplasty ring. 3. The left atrium is severely dilated in size. 4. The right atrium is severely dilated in size. 5. An annuloplasty ring is noted in the mitral position. 6. Mild mitral regurgitation. 7. Mild-moderate tricuspid regurgitation. 8. Estimated pulmonary artery systolic pressure is 42 mmHg, consistent with mild pulmonary hypertension.  (05/22/2023): 2D echo. EF 25-30%. Severely decreased global LV systolic function. Severe LAE. 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 Mild to moderate TR.   [de-identified] : 12/6/2023: RF ablation of AF/AFL: -Successful radiofrequency ablation of Atrial Fibrillation in the form of pulmonary veins isolation with entrance and exit block. -Successful posterior wall isolation with entrance and exit block.  -Successful radiofrequency ablation of cavotricuspid isthmus dependent flutter. -Scar homogenization in lateral mid RA to treat atypical right atrial flutter. -Presence of extensive scarring and remodeling of right and left atrium prior to ablation.  (05/22/2023): AMANDEEP cardioversion.

## 2024-06-03 NOTE — HISTORY OF PRESENT ILLNESS
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 remains in sinus on Amio. EF improved.   12/6/2023: RF ablation of AF/AFL: -Successful radiofrequency ablation of Atrial Fibrillation in the form of pulmonary veins isolation with entrance and exit block. -Successful posterior wall isolation with entrance and exit block.  -Successful radiofrequency ablation of cavotricuspid isthmus dependent flutter. -Scar homogenization in lateral mid RA to treat atypical right atrial flutter. -Presence of extensive scarring and remodeling of right and left atrium prior to ablation.  1/22/2024: presents in sinus rhythm. Amiodarone lowered to 100 mg daily.  6/3/2024: remains in sinus rhythm. reports occasional dizziness. She has no chest pain, no shortness of breath, mild dyspnea on exertion, no orthopnea, no PND. She denies palpitations and syncope. She has mild exertional symptoms (walking uphill). She presents for evaluation.

## 2024-06-03 NOTE — DISCUSSION/SUMMARY
[FreeTextEntry1] : MS. 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 Patient underwent AMANDEEP/DCCV on 5/23/2023. She was started on Amiodarone (TSH 5.2 on 5/23/2023). EF improved in sinus rhythm. Amiodarone lowered to 100 mg daily on 1/22/2024 and stopped on 6/3/2024. She underwent on 12/6/2023: RF ablation of AF/AFL: -Successful radiofrequency ablation of Atrial Fibrillation in the form of pulmonary veins isolation with entrance and exit block. -Successful posterior wall isolation with entrance and exit block.  -Successful radiofrequency ablation of cavotricuspid isthmus dependent flutter. -Scar homogenization in lateral mid RA to treat atypical right atrial flutter. -Presence of extensive scarring and remodeling of right and left atrium prior to ablation.  Patient is on GDMT. I recommend continuing Metoprolol and lower Lisinopril 2.5 mg due to low BP and optimize dosage.   I recommend to lower Eliquis to 2.5 mg q12h (KOBY clipped, Weight 123 lbs, Age ~79)  I recommend to stop Amiodarone.  Patient has no arrhythmias since the catheter ablation. There are no groin hematomas or bleeding. Patient is pleased with results of catheter ablation although is aware with the risk of recurrent symptoms and need for another ablation. Post ablation care was discussed in great length. I discussed with patient contacting me in case of any symptoms suggestive of recurrence.  I discussed with patient plan of care in great details. I answered all her questions to her satisfaction. Patient was pleased with the visit.  Patient will follow with me in 12 months' time. Please do not hesitate to contact me at [phone] if you have any further questions regarding this patient care.  [EKG obtained to assist in diagnosis and management of assessed problem(s)] : EKG obtained to assist in diagnosis and management of assessed problem(s)

## 2024-06-08 ENCOUNTER — OUTPATIENT (OUTPATIENT)
Dept: OUTPATIENT SERVICES | Facility: HOSPITAL | Age: 79
LOS: 1 days | End: 2024-06-08
Payer: MEDICARE

## 2024-06-08 ENCOUNTER — RESULT REVIEW (OUTPATIENT)
Age: 79
End: 2024-06-08

## 2024-06-08 DIAGNOSIS — Z90.2 ACQUIRED ABSENCE OF LUNG [PART OF]: Chronic | ICD-10-CM

## 2024-06-08 DIAGNOSIS — Z92.3 PERSONAL HISTORY OF IRRADIATION: Chronic | ICD-10-CM

## 2024-06-08 DIAGNOSIS — Z92.21 PERSONAL HISTORY OF ANTINEOPLASTIC CHEMOTHERAPY: Chronic | ICD-10-CM

## 2024-06-08 DIAGNOSIS — Z98.890 OTHER SPECIFIED POSTPROCEDURAL STATES: Chronic | ICD-10-CM

## 2024-06-08 DIAGNOSIS — Z00.8 ENCOUNTER FOR OTHER GENERAL EXAMINATION: ICD-10-CM

## 2024-06-08 DIAGNOSIS — C34.90 MALIGNANT NEOPLASM OF UNSPECIFIED PART OF UNSPECIFIED BRONCHUS OR LUNG: ICD-10-CM

## 2024-06-08 PROCEDURE — 71250 CT THORAX DX C-: CPT

## 2024-06-08 PROCEDURE — 71250 CT THORAX DX C-: CPT | Mod: 26

## 2024-06-09 DIAGNOSIS — C34.90 MALIGNANT NEOPLASM OF UNSPECIFIED PART OF UNSPECIFIED BRONCHUS OR LUNG: ICD-10-CM

## 2024-06-10 DIAGNOSIS — K76.9 LIVER DISEASE, UNSPECIFIED: ICD-10-CM

## 2024-08-10 ENCOUNTER — OUTPATIENT (OUTPATIENT)
Dept: OUTPATIENT SERVICES | Facility: HOSPITAL | Age: 79
LOS: 1 days | End: 2024-08-10
Payer: MEDICARE

## 2024-08-10 ENCOUNTER — RESULT REVIEW (OUTPATIENT)
Age: 79
End: 2024-08-10

## 2024-08-10 DIAGNOSIS — Z92.21 PERSONAL HISTORY OF ANTINEOPLASTIC CHEMOTHERAPY: Chronic | ICD-10-CM

## 2024-08-10 DIAGNOSIS — Z98.890 OTHER SPECIFIED POSTPROCEDURAL STATES: Chronic | ICD-10-CM

## 2024-08-10 DIAGNOSIS — Z90.2 ACQUIRED ABSENCE OF LUNG [PART OF]: Chronic | ICD-10-CM

## 2024-08-10 DIAGNOSIS — C34.90 MALIGNANT NEOPLASM OF UNSPECIFIED PART OF UNSPECIFIED BRONCHUS OR LUNG: ICD-10-CM

## 2024-08-10 DIAGNOSIS — Z00.8 ENCOUNTER FOR OTHER GENERAL EXAMINATION: ICD-10-CM

## 2024-08-10 DIAGNOSIS — Z92.3 PERSONAL HISTORY OF IRRADIATION: Chronic | ICD-10-CM

## 2024-08-10 DIAGNOSIS — K76.9 LIVER DISEASE, UNSPECIFIED: ICD-10-CM

## 2024-08-10 PROCEDURE — 74183 MRI ABD W/O CNTR FLWD CNTR: CPT

## 2024-08-10 PROCEDURE — A9579: CPT

## 2024-08-10 PROCEDURE — 74183 MRI ABD W/O CNTR FLWD CNTR: CPT | Mod: 26

## 2024-08-11 DIAGNOSIS — K76.9 LIVER DISEASE, UNSPECIFIED: ICD-10-CM

## 2024-08-11 DIAGNOSIS — C34.90 MALIGNANT NEOPLASM OF UNSPECIFIED PART OF UNSPECIFIED BRONCHUS OR LUNG: ICD-10-CM

## 2024-09-03 ENCOUNTER — RX RENEWAL (OUTPATIENT)
Age: 79
End: 2024-09-03

## 2024-10-03 ENCOUNTER — NON-APPOINTMENT (OUTPATIENT)
Age: 79
End: 2024-10-03

## 2024-10-03 ENCOUNTER — APPOINTMENT (OUTPATIENT)
Dept: CARDIOLOGY | Facility: CLINIC | Age: 79
End: 2024-10-03
Payer: MEDICARE

## 2024-10-03 VITALS
WEIGHT: 125 LBS | HEIGHT: 65 IN | BODY MASS INDEX: 20.83 KG/M2 | SYSTOLIC BLOOD PRESSURE: 108 MMHG | DIASTOLIC BLOOD PRESSURE: 68 MMHG

## 2024-10-03 DIAGNOSIS — I10 ESSENTIAL (PRIMARY) HYPERTENSION: ICD-10-CM

## 2024-10-03 DIAGNOSIS — I42.9 CARDIOMYOPATHY, UNSPECIFIED: ICD-10-CM

## 2024-10-03 DIAGNOSIS — I48.19 OTHER PERSISTENT ATRIAL FIBRILLATION: ICD-10-CM

## 2024-10-03 PROCEDURE — 99214 OFFICE O/P EST MOD 30 MIN: CPT

## 2024-10-03 PROCEDURE — 93000 ELECTROCARDIOGRAM COMPLETE: CPT

## 2024-10-03 PROCEDURE — G2211 COMPLEX E/M VISIT ADD ON: CPT

## 2024-10-03 NOTE — DISCUSSION/SUMMARY
[FreeTextEntry1] : HFimpEF >55% with LBBB AF s/p AMANDEEP/CV on 5/22/2023 MR s/p MV repair and KOBY closure (2017)  SR today BP borderline low   Continue Toprol and Lisinopril Eliquis lowered to 2.5 mg EP follow-up in June 2025 Follow-up 6 months

## 2024-10-03 NOTE — HISTORY OF PRESENT ILLNESS
[FreeTextEntry1] : Establish care after discharge. Records reviewed. Former patient of Dr. Sanderson.  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  Doing well.  Occasional lightheadedness but no syncope.  No CP, SOB, palpitations.   ECHO (9/2023):  EF >55%, mitral annuloplasty ring, Mild MR, RVSP 42  6/2023 TSH  14

## 2024-11-04 NOTE — PRE-ANESTHESIA EVALUATION ADULT - NSANTHDIETYNSD_GEN_ALL_CORE
Patient called the RX Refill Line. Message is being forwarded to the office.     Patient is requesting a refill for OneTouch Verio Test Strips and OneTouch Delica Plus Lancets 33G to be sent to [facility]/pharmacy #[id number] - [place] - [address]     Please contact patient at [phone]     Yes

## 2025-02-07 NOTE — H&P PST ADULT - MUSCULOSKELETAL
Yes
normal/ROM intact/normal gait/strength 5/5 bilateral upper extremities/strength 5/5 bilateral lower extremities

## 2025-04-29 ENCOUNTER — APPOINTMENT (OUTPATIENT)
Dept: CARDIOLOGY | Facility: CLINIC | Age: 80
End: 2025-04-29
Payer: MEDICARE

## 2025-04-29 VITALS
BODY MASS INDEX: 20.49 KG/M2 | SYSTOLIC BLOOD PRESSURE: 115 MMHG | HEIGHT: 65 IN | HEART RATE: 82 BPM | WEIGHT: 123 LBS | DIASTOLIC BLOOD PRESSURE: 60 MMHG

## 2025-04-29 DIAGNOSIS — Z13.6 ENCOUNTER FOR SCREENING FOR CARDIOVASCULAR DISORDERS: ICD-10-CM

## 2025-04-29 DIAGNOSIS — I10 ESSENTIAL (PRIMARY) HYPERTENSION: ICD-10-CM

## 2025-04-29 DIAGNOSIS — I48.91 UNSPECIFIED ATRIAL FIBRILLATION: ICD-10-CM

## 2025-04-29 DIAGNOSIS — I42.9 CARDIOMYOPATHY, UNSPECIFIED: ICD-10-CM

## 2025-04-29 PROCEDURE — 99214 OFFICE O/P EST MOD 30 MIN: CPT

## 2025-04-29 PROCEDURE — G2211 COMPLEX E/M VISIT ADD ON: CPT

## 2025-04-29 PROCEDURE — 93000 ELECTROCARDIOGRAM COMPLETE: CPT

## 2025-04-29 RX ORDER — LEVOTHYROXINE SODIUM 25 UG/1
25 CAPSULE ORAL DAILY
Refills: 0 | Status: ACTIVE | COMMUNITY

## 2025-04-30 ENCOUNTER — RX RENEWAL (OUTPATIENT)
Age: 80
End: 2025-04-30

## 2025-05-31 ENCOUNTER — NON-APPOINTMENT (OUTPATIENT)
Age: 80
End: 2025-05-31

## 2025-06-10 ENCOUNTER — APPOINTMENT (OUTPATIENT)
Dept: ELECTROPHYSIOLOGY | Facility: CLINIC | Age: 80
End: 2025-06-10
Payer: MEDICARE

## 2025-06-10 VITALS
WEIGHT: 123 LBS | DIASTOLIC BLOOD PRESSURE: 62 MMHG | BODY MASS INDEX: 21 KG/M2 | HEART RATE: 117 BPM | HEIGHT: 64 IN | SYSTOLIC BLOOD PRESSURE: 112 MMHG

## 2025-06-10 PROCEDURE — 99215 OFFICE O/P EST HI 40 MIN: CPT | Mod: 25

## 2025-06-10 PROCEDURE — 93000 ELECTROCARDIOGRAM COMPLETE: CPT

## 2025-06-21 ENCOUNTER — INPATIENT (INPATIENT)
Facility: HOSPITAL | Age: 80
LOS: 1 days | Discharge: HOME | DRG: 309 | End: 2025-06-23
Attending: INTERNAL MEDICINE | Admitting: STUDENT IN AN ORGANIZED HEALTH CARE EDUCATION/TRAINING PROGRAM
Payer: MEDICARE

## 2025-06-21 VITALS
OXYGEN SATURATION: 99 % | WEIGHT: 125 LBS | RESPIRATION RATE: 18 BRPM | TEMPERATURE: 99 F | SYSTOLIC BLOOD PRESSURE: 90 MMHG | DIASTOLIC BLOOD PRESSURE: 58 MMHG | HEART RATE: 160 BPM

## 2025-06-21 DIAGNOSIS — Z90.2 ACQUIRED ABSENCE OF LUNG [PART OF]: Chronic | ICD-10-CM

## 2025-06-21 DIAGNOSIS — Z92.3 PERSONAL HISTORY OF IRRADIATION: Chronic | ICD-10-CM

## 2025-06-21 DIAGNOSIS — Z98.890 OTHER SPECIFIED POSTPROCEDURAL STATES: Chronic | ICD-10-CM

## 2025-06-21 DIAGNOSIS — I48.91 UNSPECIFIED ATRIAL FIBRILLATION: ICD-10-CM

## 2025-06-21 DIAGNOSIS — Z92.21 PERSONAL HISTORY OF ANTINEOPLASTIC CHEMOTHERAPY: Chronic | ICD-10-CM

## 2025-06-21 LAB
ALBUMIN SERPL ELPH-MCNC: 4 G/DL — SIGNIFICANT CHANGE UP (ref 3.5–5.2)
ALP SERPL-CCNC: 89 U/L — SIGNIFICANT CHANGE UP (ref 30–115)
ALT FLD-CCNC: 11 U/L — SIGNIFICANT CHANGE UP (ref 0–41)
ANION GAP SERPL CALC-SCNC: 14 MMOL/L — SIGNIFICANT CHANGE UP (ref 7–14)
APTT BLD: 39.1 SEC — SIGNIFICANT CHANGE UP (ref 27–39.2)
AST SERPL-CCNC: 19 U/L — SIGNIFICANT CHANGE UP (ref 0–41)
BASE EXCESS BLDV CALC-SCNC: -2.6 MMOL/L — LOW (ref -2–3)
BASOPHILS # BLD AUTO: 0.06 K/UL — SIGNIFICANT CHANGE UP (ref 0–0.2)
BASOPHILS NFR BLD AUTO: 0.8 % — SIGNIFICANT CHANGE UP (ref 0–1)
BILIRUB SERPL-MCNC: 0.5 MG/DL — SIGNIFICANT CHANGE UP (ref 0.2–1.2)
BUN SERPL-MCNC: 19 MG/DL — SIGNIFICANT CHANGE UP (ref 10–20)
CA-I SERPL-SCNC: 1.25 MMOL/L — SIGNIFICANT CHANGE UP (ref 1.15–1.33)
CALCIUM SERPL-MCNC: 9.7 MG/DL — SIGNIFICANT CHANGE UP (ref 8.4–10.5)
CHLORIDE SERPL-SCNC: 102 MMOL/L — SIGNIFICANT CHANGE UP (ref 98–110)
CO2 SERPL-SCNC: 22 MMOL/L — SIGNIFICANT CHANGE UP (ref 17–32)
CREAT SERPL-MCNC: 1.2 MG/DL — SIGNIFICANT CHANGE UP (ref 0.7–1.5)
EGFR: 46 ML/MIN/1.73M2 — LOW
EGFR: 46 ML/MIN/1.73M2 — LOW
EOSINOPHIL # BLD AUTO: 0.11 K/UL — SIGNIFICANT CHANGE UP (ref 0–0.7)
EOSINOPHIL NFR BLD AUTO: 1.4 % — SIGNIFICANT CHANGE UP (ref 0–8)
GAS PNL BLDA: SIGNIFICANT CHANGE UP
GAS PNL BLDV: 134 MMOL/L — LOW (ref 136–145)
GAS PNL BLDV: SIGNIFICANT CHANGE UP
GLUCOSE SERPL-MCNC: 130 MG/DL — HIGH (ref 70–99)
HCO3 BLDV-SCNC: 24 MMOL/L — SIGNIFICANT CHANGE UP (ref 22–29)
HCT VFR BLD CALC: 35.1 % — LOW (ref 37–47)
HCT VFR BLDA CALC: 35 % — SIGNIFICANT CHANGE UP (ref 34.5–46.5)
HGB BLD CALC-MCNC: 11.7 G/DL — SIGNIFICANT CHANGE UP (ref 11.7–16.1)
HGB BLD-MCNC: 11.5 G/DL — LOW (ref 12–16)
IMM GRANULOCYTES NFR BLD AUTO: 0.5 % — HIGH (ref 0.1–0.3)
INR BLD: 1.34 RATIO — HIGH (ref 0.65–1.3)
LACTATE BLDV-MCNC: 2.4 MMOL/L — HIGH (ref 0.5–2)
LYMPHOCYTES # BLD AUTO: 1.19 K/UL — LOW (ref 1.2–3.4)
LYMPHOCYTES # BLD AUTO: 14.9 % — LOW (ref 20.5–51.1)
MAGNESIUM SERPL-MCNC: 1.4 MG/DL — LOW (ref 1.8–2.4)
MCHC RBC-ENTMCNC: 30 PG — SIGNIFICANT CHANGE UP (ref 27–31)
MCHC RBC-ENTMCNC: 32.8 G/DL — SIGNIFICANT CHANGE UP (ref 32–37)
MCV RBC AUTO: 91.6 FL — SIGNIFICANT CHANGE UP (ref 81–99)
MONOCYTES # BLD AUTO: 0.68 K/UL — HIGH (ref 0.1–0.6)
MONOCYTES NFR BLD AUTO: 8.5 % — SIGNIFICANT CHANGE UP (ref 1.7–9.3)
NEUTROPHILS # BLD AUTO: 5.88 K/UL — SIGNIFICANT CHANGE UP (ref 1.4–6.5)
NEUTROPHILS NFR BLD AUTO: 73.9 % — SIGNIFICANT CHANGE UP (ref 42.2–75.2)
NRBC BLD AUTO-RTO: 0 /100 WBCS — SIGNIFICANT CHANGE UP (ref 0–0)
PCO2 BLDV: 49 MMHG — HIGH (ref 39–42)
PH BLDV: 7.3 — LOW (ref 7.32–7.43)
PLATELET # BLD AUTO: 306 K/UL — SIGNIFICANT CHANGE UP (ref 130–400)
PMV BLD: 9.2 FL — SIGNIFICANT CHANGE UP (ref 7.4–10.4)
PO2 BLDV: 24 MMHG — LOW (ref 25–45)
POTASSIUM BLDV-SCNC: 4.1 MMOL/L — SIGNIFICANT CHANGE UP (ref 3.5–5.1)
POTASSIUM SERPL-MCNC: 4.3 MMOL/L — SIGNIFICANT CHANGE UP (ref 3.5–5)
POTASSIUM SERPL-SCNC: 4.3 MMOL/L — SIGNIFICANT CHANGE UP (ref 3.5–5)
PROT SERPL-MCNC: 7.1 G/DL — SIGNIFICANT CHANGE UP (ref 6–8)
PROTHROM AB SERPL-ACNC: 15.9 SEC — HIGH (ref 9.95–12.87)
RBC # BLD: 3.83 M/UL — LOW (ref 4.2–5.4)
RBC # FLD: 13.3 % — SIGNIFICANT CHANGE UP (ref 11.5–14.5)
SAO2 % BLDV: 32.2 % — LOW (ref 67–88)
SODIUM SERPL-SCNC: 138 MMOL/L — SIGNIFICANT CHANGE UP (ref 135–146)
TROPONIN T, HIGH SENSITIVITY RESULT: 14 NG/L — HIGH (ref 6–13)
TROPONIN T, HIGH SENSITIVITY RESULT: 14 NG/L — HIGH (ref 6–13)
WBC # BLD: 7.96 K/UL — SIGNIFICANT CHANGE UP (ref 4.8–10.8)
WBC # FLD AUTO: 7.96 K/UL — SIGNIFICANT CHANGE UP (ref 4.8–10.8)

## 2025-06-21 PROCEDURE — 99223 1ST HOSP IP/OBS HIGH 75: CPT

## 2025-06-21 PROCEDURE — 83540 ASSAY OF IRON: CPT

## 2025-06-21 PROCEDURE — 92960 CARDIOVERSION ELECTRIC EXT: CPT

## 2025-06-21 PROCEDURE — 93312 ECHO TRANSESOPHAGEAL: CPT

## 2025-06-21 PROCEDURE — 82746 ASSAY OF FOLIC ACID SERUM: CPT

## 2025-06-21 PROCEDURE — 80061 LIPID PANEL: CPT

## 2025-06-21 PROCEDURE — 82728 ASSAY OF FERRITIN: CPT

## 2025-06-21 PROCEDURE — 93306 TTE W/DOPPLER COMPLETE: CPT

## 2025-06-21 PROCEDURE — 80048 BASIC METABOLIC PNL TOTAL CA: CPT

## 2025-06-21 PROCEDURE — 36415 COLL VENOUS BLD VENIPUNCTURE: CPT

## 2025-06-21 PROCEDURE — 83605 ASSAY OF LACTIC ACID: CPT

## 2025-06-21 PROCEDURE — 93308 TTE F-UP OR LMTD: CPT | Mod: 26

## 2025-06-21 PROCEDURE — 85018 HEMOGLOBIN: CPT

## 2025-06-21 PROCEDURE — 83880 ASSAY OF NATRIURETIC PEPTIDE: CPT

## 2025-06-21 PROCEDURE — 71045 X-RAY EXAM CHEST 1 VIEW: CPT | Mod: 26

## 2025-06-21 PROCEDURE — 84295 ASSAY OF SERUM SODIUM: CPT

## 2025-06-21 PROCEDURE — 82607 VITAMIN B-12: CPT

## 2025-06-21 PROCEDURE — 76604 US EXAM CHEST: CPT | Mod: 26

## 2025-06-21 PROCEDURE — 84132 ASSAY OF SERUM POTASSIUM: CPT

## 2025-06-21 PROCEDURE — 85045 AUTOMATED RETICULOCYTE COUNT: CPT

## 2025-06-21 PROCEDURE — 99285 EMERGENCY DEPT VISIT HI MDM: CPT

## 2025-06-21 PROCEDURE — 93005 ELECTROCARDIOGRAM TRACING: CPT

## 2025-06-21 PROCEDURE — 85014 HEMATOCRIT: CPT

## 2025-06-21 PROCEDURE — 85025 COMPLETE CBC W/AUTO DIFF WBC: CPT

## 2025-06-21 PROCEDURE — 83735 ASSAY OF MAGNESIUM: CPT

## 2025-06-21 PROCEDURE — 93320 DOPPLER ECHO COMPLETE: CPT

## 2025-06-21 PROCEDURE — 93325 DOPPLER ECHO COLOR FLOW MAPG: CPT

## 2025-06-21 PROCEDURE — 83036 HEMOGLOBIN GLYCOSYLATED A1C: CPT

## 2025-06-21 PROCEDURE — 93010 ELECTROCARDIOGRAM REPORT: CPT

## 2025-06-21 PROCEDURE — 83550 IRON BINDING TEST: CPT

## 2025-06-21 PROCEDURE — 84443 ASSAY THYROID STIM HORMONE: CPT

## 2025-06-21 PROCEDURE — 82330 ASSAY OF CALCIUM: CPT

## 2025-06-21 PROCEDURE — 80053 COMPREHEN METABOLIC PANEL: CPT

## 2025-06-21 PROCEDURE — 82803 BLOOD GASES ANY COMBINATION: CPT

## 2025-06-21 RX ORDER — APIXABAN 2.5 MG/1
2.5 TABLET, FILM COATED ORAL EVERY 12 HOURS
Refills: 0 | Status: DISCONTINUED | OUTPATIENT
Start: 2025-06-21 | End: 2025-06-23

## 2025-06-21 RX ORDER — LEVOTHYROXINE SODIUM 300 MCG
1 TABLET ORAL
Refills: 0 | DISCHARGE

## 2025-06-21 RX ORDER — DILTIAZEM HYDROCHLORIDE 240 MG/1
20 TABLET, EXTENDED RELEASE ORAL ONCE
Refills: 0 | Status: COMPLETED | OUTPATIENT
Start: 2025-06-21 | End: 2025-06-21

## 2025-06-21 RX ORDER — METOPROLOL SUCCINATE 50 MG/1
50 TABLET, EXTENDED RELEASE ORAL EVERY 8 HOURS
Refills: 0 | Status: DISCONTINUED | OUTPATIENT
Start: 2025-06-21 | End: 2025-06-23

## 2025-06-21 RX ORDER — LEVOTHYROXINE SODIUM 300 MCG
25 TABLET ORAL DAILY
Refills: 0 | Status: DISCONTINUED | OUTPATIENT
Start: 2025-06-21 | End: 2025-06-23

## 2025-06-21 RX ORDER — MAGNESIUM SULFATE 500 MG/ML
2 SYRINGE (ML) INJECTION ONCE
Refills: 0 | Status: COMPLETED | OUTPATIENT
Start: 2025-06-21 | End: 2025-06-21

## 2025-06-21 RX ORDER — SERTRALINE 100 MG/1
25 TABLET, FILM COATED ORAL DAILY
Refills: 0 | Status: DISCONTINUED | OUTPATIENT
Start: 2025-06-21 | End: 2025-06-23

## 2025-06-21 RX ORDER — LISINOPRIL 5 MG/1
2.5 TABLET ORAL DAILY
Refills: 0 | Status: DISCONTINUED | OUTPATIENT
Start: 2025-06-21 | End: 2025-06-23

## 2025-06-21 RX ORDER — MAGNESIUM SULFATE 500 MG/ML
2 SYRINGE (ML) INJECTION
Refills: 0 | Status: COMPLETED | OUTPATIENT
Start: 2025-06-21 | End: 2025-06-21

## 2025-06-21 RX ORDER — LISINOPRIL 5 MG/1
1 TABLET ORAL
Refills: 0 | DISCHARGE

## 2025-06-21 RX ORDER — APIXABAN 2.5 MG/1
1 TABLET, FILM COATED ORAL
Refills: 0 | DISCHARGE

## 2025-06-21 RX ORDER — SODIUM CHLORIDE 9 G/1000ML
500 INJECTION, SOLUTION INTRAVENOUS ONCE
Refills: 0 | Status: COMPLETED | OUTPATIENT
Start: 2025-06-21 | End: 2025-06-21

## 2025-06-21 RX ADMIN — Medication 25 GRAM(S): at 17:22

## 2025-06-21 RX ADMIN — Medication 25 GRAM(S): at 21:22

## 2025-06-21 RX ADMIN — APIXABAN 2.5 MILLIGRAM(S): 2.5 TABLET, FILM COATED ORAL at 21:22

## 2025-06-21 RX ADMIN — LISINOPRIL 2.5 MILLIGRAM(S): 5 TABLET ORAL at 21:22

## 2025-06-21 RX ADMIN — DILTIAZEM HYDROCHLORIDE 20 MILLIGRAM(S): 240 TABLET, EXTENDED RELEASE ORAL at 12:01

## 2025-06-21 RX ADMIN — SODIUM CHLORIDE 500 MILLILITER(S): 9 INJECTION, SOLUTION INTRAVENOUS at 12:09

## 2025-06-21 RX ADMIN — Medication 25 GRAM(S): at 12:48

## 2025-06-21 RX ADMIN — Medication 25 GRAM(S): at 19:04

## 2025-06-21 NOTE — ED PROVIDER NOTE - CLINICAL SUMMARY MEDICAL DECISION MAKING FREE TEXT BOX
79-year-old female with past medical history of cardiomyopathy, HFrEF, left bundle branch block, atrial fibrillation with previous ablation, on Eliquis, MR status post repair and KOBY closure, LUIS ARMANDO adenocarcinoma s/p resection, follows with cardiology Dr. Vasquez from outpatient, presents with palpitations that started this morning when patient woke up.  Patient states chronic shortness of breath on exertion.  Denies any other symptoms.  Took her medications including metoprolol this morning.  No fever, chills, n/v, cp,  pleuritic cp,   diaphoresis, cough, ha/lh/dizziness, numbness/tingling, neck pain/ stiffness, abd pain, diarrhea, constipation, melena/brbpr, urinary symptoms, trauma,  edema, calf pain/swelling/erythema, sick contacts, recent travel or rash.    Vital Signs: I have reviewed the initial vital signs. Constitutional: Non toxic appearing pt sitting on stretcher speaking full sentences. Integumentary: No rash. ENT: MMM NECK: Supple, non-tender, no meningeal signs. Cardiovascular: Irregular, irregular, radial pulses 2/4 b/l. No JVD. Respiratory: BS present b/l, ctabl, no wheezing or crackles,  no accessory muscle use, no stridor. Gastrointestinal: BS present throughout all 4 quadrants, soft, nd, nt, no rebound tenderness or guarding, no cvat. Musculoskeletal: FROM, no edema, no calf pain/swelling/erythema. Neurologic: AAOx3, motor 5/5 and sensation intact throughout upper and lower ext, CN II-XII intact, No facial droop or slurring of speech. No focal deficits.    Plan: Monitor, EKG, CXR, labs, gentle fluid hydration, meds, reassess.    Labs and EKG were ordered and reviewed.  Imaging was ordered and reviewed by me.  Appropriate medications for patient's presenting complaints were ordered and effects were reassessed.  Patient's records (prior hospital, ED visit) were reviewed.  Additional history was obtained from Son. 79-year-old female with past medical history of cardiomyopathy, HFrEF, left bundle branch block, atrial fibrillation with previous ablation, on Eliquis, MR status post repair and KOBY closure, LUIS ARMANDO adenocarcinoma s/p resection, follows with cardiology Dr. Vasquez from outpatient, presents with palpitations that started this morning when patient woke up.  Patient states chronic shortness of breath on exertion.  Denies any other symptoms.  Took her medications including metoprolol this morning.  No fever, chills, n/v, cp,  pleuritic cp,   diaphoresis, cough, ha/lh/dizziness, numbness/tingling, neck pain/ stiffness, abd pain, diarrhea, constipation, melena/brbpr, urinary symptoms, trauma,  edema, calf pain/swelling/erythema, sick contacts, recent travel or rash.    Vital Signs: I have reviewed the initial vital signs. Constitutional: Non toxic appearing pt sitting on stretcher speaking full sentences. Integumentary: No rash. ENT: MMM NECK: Supple, non-tender, no meningeal signs. Cardiovascular: Irregular, irregular, radial pulses 2/4 b/l. No JVD. Respiratory: BS present b/l, ctabl, no wheezing or crackles,  no accessory muscle use, no stridor. Gastrointestinal: BS present throughout all 4 quadrants, soft, nd, nt, no rebound tenderness or guarding, no cvat. Musculoskeletal: FROM, no edema, no calf pain/swelling/erythema. Neurologic: AAOx3, motor 5/5 and sensation intact throughout upper and lower ext, CN II-XII intact, No facial droop or slurring of speech. No focal deficits.    Plan: Monitor, EKG, CXR, labs, gentle fluid hydration, meds, reassess.    Labs and EKG were ordered and reviewed.  Imaging was ordered and reviewed by me.  Appropriate medications for patient's presenting complaints were ordered and effects were reassessed.  Patient's records (prior hospital, ED visit) were reviewed.  Additional history was obtained from Son. Escalation to admission/observation was considered. Patient requires inpatient hospitalization - monitored setting.  I have fully discussed the medical management and delivery of care with the patient. I have discussed any available labs, imaging and treatment options with the patient.  Pt admitted for further care & management.

## 2025-06-21 NOTE — ED ADULT TRIAGE NOTE - GLASGOW COMA SCALE: SCORE, MLM
Transfemale Resources    [facility] Transgender Support Group - Facebook group    [employer] for Transgender Health, Standards of Care  https://www.wpath.org/publications/soc - pages 38 and 40 especially     [facility], Transgender Health - [email or web address]      [facility] - [email or web address]    [facility] - [email or web address]     Parents, Families, and Friends of Lesbians and [place] - [email or web address].pflag.[email or web address]/[email or web address]     [facility] for Transgender [place] - [email or web address]://[email or web address]/documents     * * * * * *     www.LK FREEMAN     1cc, Luer-Bryon Tip  Syringes - ex: https://www.Azoi/dp/X25STJTM6P     18 G x 1.5” hypodermic needles for drawing up - ex: https://www.Azoi/dp/U16O6GA14I     25 G x 1.5” hypodermic needles for injecting - ex: https://wwwEdkimo/dp/V10OBIE8I5/     [facility] Trans Health Injection Guide -   [email or web address]-content/uploads/[email or web address]_[email or web address]_InjectionGuide.pdf     Injection supplies:  Syringes   Needles   Alcohol swabs  Cotton balls  Sharps container           15

## 2025-06-21 NOTE — H&P ADULT - NSHPPHYSICALEXAM_GEN_ALL_CORE
GENERAL: NAD, lying in bed comfortably,  NERVOUS SYSTEM:  A&Ox3, no focal deficits   HEENT: no JVD, no HJR  CHEST/LUNG: Clear to auscultation bilaterally; no orthopnea  HEART: Regular rate and rhythm on exam, tachycardic no murmurs  ABDOMEN: Soft, nontender, nondistended, Bowel sounds present  EXTREMITIES:  2+ Peripheral Pulses, No lower extremity edema,  SKIN: No rashes or lesions

## 2025-06-21 NOTE — ED PROVIDER NOTE - DIFFERENTIAL DIAGNOSIS
Differential Diagnosis The differential diagnosis for patients clinical presentation includes but is not limited to:  afib with rvr  metabolic vs infectious etiology  arrythmia  acs

## 2025-06-21 NOTE — ED PROVIDER NOTE - CARE PLAN
Principal Discharge DX:	Atrial fibrillation with RVR  Secondary Diagnosis:	Hypomagnesemia  Secondary Diagnosis:	QT prolongation   1

## 2025-06-21 NOTE — H&P ADULT - ASSESSMENT
79-year-old female with past medical history of  HFrEF 25-30% (2023) , paroxysmal atrial fibrillation s/p ablation in 2023  on Eliquis  (follows with cardiology Dr. Driscoll and Dr. Martinez , planned for ablation in was planned for another ablation in 10/2025 as patient was in Afib last office admission with  Dr. Martinez), MR  status post repair and LAAC , LUIS ARMANDO adenocarcinoma s/p resection and CRT in 2018, presents with palpitations that started this morning when patient woke up.  Patient states chronic shortness of breath on exertion.  Denies any other symptoms.  Took her medications including metoprolol this morning.        I recommend continuing Metoprolol and lower Lisinopril 2.5 mg due to low BP and optimize dosage. I recommend to lower Eliquis to 2.5 mg q12h (KOBY clipped, Weight 123 lbs, Age ~79)I recommend AMANDEEP/DCCV in June and catheter ablation of AF in October 2025 79-year-old female with past medical history of  HFrEF 25-30% improved to 50-55% (last echo in december 2023), paroxysmal atrial fibrillation s/p ablation in 2023  on Eliquis  (follows with cardiology Dr. Drisocll and Dr. Martinez , planned for ablation in was planned for another ablation in 10/2025 as patient was in Afib last office admission with  Dr. 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 Patient did complain of some associated lightheadedness and worsening of her usual dyspnea on exertion but denies chest pain. She called her son and decided to come to the hospital. EKG showed with afib with RVR s/p cardizem push in the ED. Admitted for further management    #Afib with RVR  - EKG showing afib with RVR  - Echo in 20    #HFpEF  #MR s/p MV repair and LAAC      I recommend continuing Metoprolol and lower Lisinopril 2.5 mg due to low BP and optimize dosage. I recommend to lower Eliquis to 2.5 mg q12h (KOBY clipped, Weight 123 lbs, Age ~79)I recommend AMANDEEP/DCCV in June and catheter ablation of AF in October 2025 79-year-old female with past medical history of  HFrEF 25-30% improved to 50-55% (last echo in december 2023), paroxysmal atrial fibrillation s/p ablation in 2023  on Eliquis  (follows with cardiology Dr. Driscoll and Dr. Martinez , planned for ablation in was planned for another ablation in 10/2025 as patient was in Afib last office admission with  Dr. 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 Patient did complain of some associated lightheadedness and worsening of her usual dyspnea on exertion but denies chest pain. She called her son and decided to come to the hospital. EKG showed with afib with RVR s/p cardizem push in the ED. Admitted for further management    #Afib with RVR  - EKG showing afib with RVR to 150s  - VS wnl  - Last echo in 12/2023: AMANDEEP: EF 50-55%, severe LA dilation, mild MR s/p mitral anular ring insertion, moderate TR  - trop 14>14  - lactate 2.0  - s/p IV cardizem 20mg in the ED  - PAtient on metoprolol 100 ER and eliquis 2.5 at home  - Patient has never stopped her AC  - Was planned for AMANDEEP/DCCV in June and ablation by Dr. Martinez in 10/2025  - Increase metoprolol to 50 tartrate q8 with holding parameters  - CHADSVASC at least 4, continue with eliquis 2.5  - Replete electrolytes  - repeat EKG  - Repeat echo, last Echo in 2023  - Consult EP for possible AMANDEEP/DCCCV  - admit to tele    #HFrEF 25-30. improved to 50-55%  #MR s/p MV repair and LAAC  - Patient euvolemic on exam  - Repeat echo   - Obtain BNP  - Repete lactate and ABG  - Patient on lisinopril 2.5 and metoprolol at home  - continue with lisnopril and increase metoprolol  -   #Mild Normocytic anemia  -Anemia w/u    DVT PPX: eliquis 2.5  GI PPX: not indicated  DIET: DASH  ACTIVITY: AT  CODE STATUS: Full code  DISPOSITION: TEle       79-year-old female with past medical history of  HFrEF 25-30% improved to 50-55% (last echo in december 2023), paroxysmal atrial fibrillation s/p ablation in 2023  on Eliquis  (follows with cardiology Dr. Driscoll and Dr. Martinez , planned for ablation in was planned for another ablation in 10/2025 as patient was in Afib last office admission with  Dr. 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 Patient did complain of some associated lightheadedness and worsening of her usual dyspnea on exertion but denies chest pain. She called her son and decided to come to the hospital. EKG showed with afib with RVR s/p cardizem push in the ED. Admitted for further management    #Afib with RVR  - EKG showing afib with RVR to 150s  - VS wnl  - Last echo in 12/2023: AMANDEEP: EF 50-55%, severe LA dilation, mild MR s/p mitral anular ring insertion, moderate TR  - trop 14>14  - lactate 2.0  - s/p IV cardizem 20mg in the ED  - PAtient on metoprolol 100 ER and eliquis 2.5 at home  - Patient has never stopped her AC  - Was planned for AMANDEEP/DCCV in June and ablation by Dr. Martinez in 10/2025  - Increase metoprolol to 50 tartrate q8 with holding parameters  - CHADSVASC at least 4, continue with eliquis 2.5  - Replete electrolytes  - repeat EKG  - Repeat echo, last Echo in 2023  - Consult EP for possible DCCV  - admit to tele    #HFrEF 25-30. improved to 50-55%  #MR s/p MV repair and LAAC  - Patient euvolemic on exam  - Repeat echo   - Obtain BNP  - Repete lactate and ABG  - Patient on lisinopril 2.5 and metoprolol at home  - continue with lisnopril and increase metoprolol  -   #Mild Normocytic anemia  -Anemia w/u    DVT PPX: eliquis 2.5  GI PPX: not indicated  DIET: DASH, Npo after MN  ACTIVITY: AT  CODE STATUS: Full code  DISPOSITION: TEle       79-year-old female with past medical history of  HFrEF 25-30% improved to 50-55% (last echo in december 2023), paroxysmal atrial fibrillation s/p ablation in 2023  on Eliquis  (follows with cardiology Dr. Driscoll and Dr. Martinez , planned for ablation in was planned for another ablation in 10/2025 as patient was in Afib last office admission with  Dr. 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 Patient did complain of some associated lightheadedness and worsening of her usual dyspnea on exertion but denies chest pain. She called her son and decided to come to the hospital. EKG showed with afib with RVR s/p cardizem push in the ED. Admitted for further management    #Afib with RVR  - EKG showing afib with RVR to 150s  - VS wnl  - Last echo in 12/2023: AMANDEEP: EF 50-55%, severe LA dilation, mild MR s/p mitral anular ring insertion, moderate TR  - trop 14>14  - lactate 2.0  - s/p IV cardizem 20mg in the ED  - PAtient on metoprolol 100 ER and eliquis 2.5 at home  - Patient has never stopped her AC  - Was planned for AMANDEEP/DCCV in June and ablation by Dr. Martinez in 10/2025  - Increase metoprolol to 50 tartrate q8 with holding parameters  - CHADSVASC at least 4, continue with eliquis 2.5  - Replete electrolytes  - repeat EKG  - Check tSH  - Repeat echo, last Echo in 2023  - Consult EP for possible DCCV  - admit to tele    #HFrEF 25-30. improved to 50-55%  #MR s/p MV repair and LAAC  - Patient euvolemic on exam  - Repeat echo   - Obtain BNP  - Repete lactate and ABG  - Patient on lisinopril 2.5 and metoprolol at home  - continue with lisnopril and increase metoprolol  -   #Mild Normocytic anemia  -Anemia w/u    DVT PPX: eliquis 2.5  GI PPX: not indicated  DIET: DASH, Npo after MN  ACTIVITY: AT  CODE STATUS: Full code  DISPOSITION: TEle       79-year-old female with past medical history of  HFrEF 25-30% improved to 50-55% (last echo in december 2023), paroxysmal atrial fibrillation s/p ablation in 2023  on Eliquis  (follows with cardiology Dr. Driscoll and Dr. Martinez , planned for ablation in was planned for another ablation in 10/2025 as patient was in Afib last office admission with  Dr. 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 Patient did complain of some associated lightheadedness and worsening of her usual dyspnea on exertion but denies chest pain. She called her son and decided to come to the hospital. EKG showed with afib with RVR s/p cardizem push in the ED. Admitted for further management    [] Afib with RVR  []Hypomagnesemia   [] prolonged Qtc   [] elevated lactate     - EKG showing afib with RVR to 150s, QTc 523  - VS wnl  - Last echo in 12/2023: AMANDEEP: EF 50-55%, severe LA dilation, mild MR s/p mitral anular ring insertion, moderate TR  - trop 14>14  - lactate 2.4  - s/p IV cardizem 20mg in the ED  - PAtient on metoprolol 100 ER and eliquis 2.5 at home  - Patient has never stopped her AC  - Was planned for AMANDEEP/DCCV in June and ablation by Dr. Martinez in 10/2025  - Increase metoprolol to 50 tartrate q8 with holding parameters  - CHADSVASC at least 4, continue with eliquis 2.5  - Replete electrolytes and monitor   - repeat EKG  - Check tSH  - Repeat echo, last Echo in 2023  - Consult EP for possible DCCV  - admit to tele  trend lactate     #HFrEF 25-30. improved to 50-55%  #MR s/p MV repair and LAAC  - Patient euvolemic on exam  - Repeat echo   - Obtain BNP  - Repete lactate and ABG  - Patient on lisinopril 2.5 and metoprolol at home  - continue with lisnopril and increase metoprolol  -   #Mild Normocytic anemia  -Anemia w/u  outpatient f/u     [] Acute Kidney Injury vs worsening  Chronic kidney disease 3  monitor electrolytes   strickt I/Os  avoid nephrotoxins   trend createnine        DVT PPX: eliquis 2.5  GI PPX: not indicated  DIET: DASH, Npo after MN  ACTIVITY: AT  CODE STATUS: Full code  DISPOSITION: TEle       79-year-old female with past medical history of  HFrEF 25-30% improved to 50-55% (last echo in december 2023), paroxysmal atrial fibrillation s/p ablation in 2023  on Eliquis  (follows with cardiology Dr. Driscoll and Dr. Martinez , planned for ablation in was planned for another ablation in 10/2025 as patient was in Afib last office admission with  Dr. 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 Patient did complain of some associated lightheadedness and worsening of her usual dyspnea on exertion but denies chest pain. She called her son and decided to come to the hospital. EKG showed with afib with RVR s/p cardizem push in the ED. Admitted for further management    [] Afib with RVR  []Hypomagnesemia   [] prolonged Qtc   [] elevated lactate     - EKG showing afib with RVR to 150s, QTc 523  - VS wnl  - Last echo in 12/2023: AMANDEEP: EF 50-55%, severe LA dilation, mild MR s/p mitral anular ring insertion, moderate TR  - trop 14>14  - lactate 2.4  - s/p IV cardizem 20mg in the ED  - PAtient on metoprolol 100 ER and eliquis 2.5 at home  - Patient has never stopped her AC  - Was planned for AMANDEEP/DCCV in June and ablation by Dr. Martinez in 10/2025  - Increase metoprolol to 50 tartrate q8 with holding parameters  - CHADSVASC at least 4, continue with eliquis 2.5  - Replete electrolytes and monitor   - repeat EKG  - Check tSH  - Repeat echo, last Echo in 2023  - Consult EP for possible DCCV  - admit to tele  trend lactate   avoid QTc prolonging meds     #HFrEF 25-30. improved to 50-55%  #MR s/p MV repair and LAAC  - Patient euvolemic on exam  - Repeat echo   - Obtain BNP  - Repete lactate and ABG  - Patient on lisinopril 2.5 and metoprolol at home  - continue with lisnopril and increase metoprolol  -   #Mild Normocytic anemia  -Anemia w/u  outpatient f/u     [] Acute Kidney Injury vs worsening  Chronic kidney disease 3  monitor electrolytes   strickt I/Os  avoid nephrotoxins   trend createnine        DVT PPX: eliquis 2.5  GI PPX: not indicated  DIET: DASH, Npo after MN  ACTIVITY: AT  CODE STATUS: Full code  DISPOSITION: TEle

## 2025-06-21 NOTE — ED PROVIDER NOTE - ATTENDING CONTRIBUTION TO CARE
79-year-old female with past medical history of cardiomyopathy, HFrEF, left bundle branch block, atrial fibrillation with previous ablation, on Eliquis, MR status post repair and KOBY closure, LUIS ARMANDO adenocarcinoma s/p resection, follows with cardiology Dr. Vasquez from outpatient, presents with palpitations that started this morning when patient woke up.  Patient states chronic shortness of breath on exertion.  Denies any other symptoms.  Took her medications including metoprolol this morning.  No fever, chills, n/v, cp,  pleuritic cp,   diaphoresis, cough, ha/lh/dizziness, numbness/tingling, neck pain/ stiffness, abd pain, diarrhea, constipation, melena/brbpr, urinary symptoms, trauma,  edema, calf pain/swelling/erythema, sick contacts, recent travel or rash.    Vital Signs: I have reviewed the initial vital signs. Constitutional: Non toxic appearing pt sitting on stretcher speaking full sentences. Integumentary: No rash. ENT: MMM NECK: Supple, non-tender, no meningeal signs. Cardiovascular: Irregular, irregular, radial pulses 2/4 b/l. No JVD. Respiratory: BS present b/l, ctabl, no wheezing or crackles,  no accessory muscle use, no stridor. Gastrointestinal: BS present throughout all 4 quadrants, soft, nd, nt, no rebound tenderness or guarding, no cvat. Musculoskeletal: FROM, no edema, no calf pain/swelling/erythema. Neurologic: AAOx3, motor 5/5 and sensation intact throughout upper and lower ext, CN II-XII intact, No facial droop or slurring of speech. No focal deficits.    Plan: Monitor, EKG, CXR, labs, gentle fluid hydration, meds, reassess.

## 2025-06-21 NOTE — ED PROVIDER NOTE - OBJECTIVE STATEMENT
Patient is an 79-year-old female with past medical history of A-fib status post ablation, MV repair, heart failure with reduced ejection fraction, left upper lobe adenocarcinoma s/p resection and CRT, presenting today with palpitations.  Patient states that last night she had palpitations that woke her up from sleep, with dyspnea on exertion, without chest pain.  Patient denies fever, nausea, vomiting, chest pain, abdominal pain, diarrhea, lower extremity swelling, alcohol use, smoking history, allergies.  Patient recently seen by Dr. Martinez and scheduled for ablation 10/2025.  Patient states she took her metoprolol this morning

## 2025-06-21 NOTE — ED PROVIDER NOTE - PHYSICAL EXAMINATION
Vital Signs: I have reviewed the initial vital signs.  Constitutional: Non toxic appearing pt sitting on stretcher speaking full sentences.   Integumentary: No rash.  ENT: MMM  NECK: Supple, non-tender, no meningeal signs.  Cardiovascular: Irregular, radial pulses 2/4 b/l. No JVD.  Respiratory: BS present b/l, ctabl, no wheezing or crackles,  no accessory muscle use, no stridor.  Gastrointestinal: BS present throughout all 4 quadrants, soft, nd, nt no rebound tenderness or guarding, no cvat.  Musculoskeletal: FROM, no edema, no calf pain/swelling/erythema.  Neurologic: AAOx3, motor 5/5 and sensation intact throughout upper and lower ext, CN II-XII intact, No facial droop or slurring of speech. No focal deficits.

## 2025-06-21 NOTE — ED ADULT NURSE NOTE - NSFALLUNIVINTERV_ED_ALL_ED
Bed/Stretcher in lowest position, wheels locked, appropriate side rails in place/Call bell, personal items and telephone in reach/Instruct patient to call for assistance before getting out of bed/chair/stretcher/Non-slip footwear applied when patient is off stretcher/[place] to call system/Physically safe environment - no spills, clutter or unnecessary equipment/Purposeful proactive rounding/Room/bathroom lighting operational, light cord in reach

## 2025-06-21 NOTE — PATIENT PROFILE ADULT - NSPROEXTENSIONSOFSELF_GEN_A_NUR
[address]  [place] [address]  Ph: [phone]  Fax: [phone]        August 15, 2022     Patient: Jossy Pina   YOB: 2017       To Whom it May Concern:    Due to limited availability in our schedule, Jossy Pina, was unable to obtain an appointment prior to the start of school. She will be seen in my clinic for a school physical on 8/17/22.         Sincerely,       [facility]                    Medical information is confidential and cannot be disclosed without the written consent of the patient or her representative.      
none

## 2025-06-21 NOTE — ED PROVIDER NOTE - EKG/XRAY ADDITIONAL INFORMATION
Sinus tachycardia at 123 bpm, CO interval 120, , QTc 523, no ST elevations or depressions Sinus tachycardia at 123 bpm, PA interval 120, , QTc 523, no ST elevations or depressions  no ptx

## 2025-06-21 NOTE — H&P ADULT - ATTENDING COMMENTS
I have personally seen and examined the patient.  I fully participated in the care of this patient. I DISCUSSED WITH THE RESIDENT AND I EDITED THE ABOVE NOTE.     On exam- Pt in ED 4, Currently denies any symptoms   General: awake, alert, NAD, frail elderly with [place]   Lungs:  clear to ausculations b/l, normal resp effort  Heart: irregular ryhthm   Abdomen: soft, non tender non distended  Ext: no edema, can move all  his extremities       Care including my personal  review of labs, imaging studies, old records, obtaining history, personally examining patient, counselling and communicating with patient, entering orders for medications/tests/etc, discussions with other health care providers,  documentation in electronic health records, independent interpretation of labs, imaging/procedure results and care coordination.

## 2025-06-21 NOTE — H&P ADULT - HISTORY OF PRESENT ILLNESS
79-year-old female with past medical history of  HFrEF 25-30% (2023) , paroxysmal atrial fibrillation s/p ablation in 2023  on Eliquis  (follows with cardiology Dr. Driscoll and Dr. Martinez , planned for ablation in was planned for another ablation in 10/2025 as patient was in Afib last office admission with  Dr. Martinez), MR  status post repair and LAAC , LUIS ARMANDO adenocarcinoma s/p resection and CRT in 2018, presents with palpitations that started this morning when patient woke up.  Patient states chronic shortness of breath on exertion.  Denies any other symptoms.  Took her medications including metoprolol this morning.    In the ED:   · BP Systolic	 90 mm Hg  · BP Diastolic	 58 mm Hg  · Heart Rate	 160 /min  · Respiration Rate (breaths/min)	18 /min  · Temp (C)	37.2 Degrees C  · SpO2 (%)	99 %      Labs: Hb 11.5, MCV     , (at baseline)  EKG showing Afib with , LBBB  trop 14>14.    S/p  bolus and cardizem 20 IV push       79-year-old female with past medical history of  HFrEF 25-30% improved to 50-55% (last echo in december 2023), paroxysmal atrial fibrillation s/p ablation in 2023  on Eliquis  (follows with cardiology Dr. Driscoll and Dr. Martinez , planned for ablation in was planned for another ablation in 10/2025 as patient was in Afib last office admission with  Dr. 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 Patient did complain of some associated lightheadedness and worsening of her usual dyspnea on exertion but denies chest pain. She called her son and decided to come to the hospital.   Patient denied any recent illness, sick contacts, fevers, chills, headaches, cough, orthopnea or dyspnea at rest, abdominal pain, n/v, diarrhea, urinary symptoms, claudications, or lower extremity edema,  Of note patient has recently seen Dr. Martinez and was scheduled for ablation in 10/2025.      In the ED:   · BP Systolic	 90 mm Hg  · BP Diastolic	 58 mm Hg  · Heart Rate	 160 /min  · Respiration Rate (breaths/min)	18 /min  · Temp (C)	37.2 Degrees C  · SpO2 (%)	99 %      Labs: Hb 11.5, MCV     , (at baseline)  EKG showing Afib with , LBBB   trop 14>14.    S/p  bolus and cardizem 20 IV push    Patient admitted for management of Afib with RVR

## 2025-06-21 NOTE — H&P ADULT - NSHPPOAPULMEMBOLUS_GEN_A_CORE
no [Annual] : an annual visit. [Menorrhagia] : menorrhagia [Pelvic Pain] : pelvic pain [Urinary Complaints] : urinary complaints

## 2025-06-21 NOTE — PATIENT PROFILE ADULT - FALL HARM RISK - RISK INTERVENTIONS

## 2025-06-21 NOTE — H&P ADULT - NSHPLABSRESULTS_GEN_ALL_CORE
LABS:                        11.5   7.96  )-----------( 306      ( 21 Jun 2025 12:00 )             35.1     06-21    138  |  102  |  19  ----------------------------<  130[H]  4.3   |  22  |  1.2    Ca    9.7      21 Jun 2025 12:00  Mg     1.4     06-21    TPro  7.1  /  Alb  4.0  /  TBili  0.5  /  DBili  x   /  AST  19  /  ALT  11  /  AlkPhos  89  06-21    PT/INR - ( 21 Jun 2025 12:00 )   PT: 15.90 sec;   INR: 1.34 ratio         PTT - ( 21 Jun 2025 12:00 )  PTT:39.1 sec  Urinalysis Basic - ( 21 Jun 2025 12:00 )    Color: x / Appearance: x / SG: x / pH: x  Gluc: 130 mg/dL / Ketone: x  / Bili: x / Urobili: x   Blood: x / Protein: x / Nitrite: x   Leuk Esterase: x / RBC: x / WBC x   Sq Epi: x / Non Sq Epi: x / Bacteria: x

## 2025-06-21 NOTE — H&P ADULT - NSHPSOCIALHISTORY_GEN_ALL_CORE
Remote smoker  Patient lives alone  able to perform ADL (dressing, bathing, cooking, groceries), walks with no difficulty or assistance, can walk more than 2 blocks with no stopping

## 2025-06-21 NOTE — PATIENT PROFILE ADULT - FUNCTIONAL ASSESSMENT - BASIC MOBILITY 4.
Detail Level: Generalized Otc Regimen: Instructed patient to apply a zinc oxide or titanium dioxide based mineral sunscreen daily with a minimum of SPF 30. Also instructed patient to re-apply sunscreen every 90 to 120 minutes. 4 = No assist / stand by assistance Initiate Treatment: Start SPF 30+ sunscreens containing zinc oxide every 90 to 120 minutes

## 2025-06-21 NOTE — ED PROVIDER NOTE - PROGRESS NOTE DETAILS
ED Attending YADIEL Anderson  PT hr improved, received cardizem, results reviewed, mg given , prolonged qtc and low mg, pt aware of plan for admission. I have fully discussed the medical management and delivery of care with the patient. I have discussed any available labs, imaging and treatment options with the patient.  Pt admitted for further care & management.

## 2025-06-22 LAB
ALBUMIN SERPL ELPH-MCNC: 3.6 G/DL — SIGNIFICANT CHANGE UP (ref 3.5–5.2)
ALP SERPL-CCNC: 80 U/L — SIGNIFICANT CHANGE UP (ref 30–115)
ALT FLD-CCNC: 12 U/L — SIGNIFICANT CHANGE UP (ref 0–41)
ANION GAP SERPL CALC-SCNC: 11 MMOL/L — SIGNIFICANT CHANGE UP (ref 7–14)
ANION GAP SERPL CALC-SCNC: 11 MMOL/L — SIGNIFICANT CHANGE UP (ref 7–14)
AST SERPL-CCNC: 18 U/L — SIGNIFICANT CHANGE UP (ref 0–41)
BASOPHILS # BLD AUTO: 0.05 K/UL — SIGNIFICANT CHANGE UP (ref 0–0.2)
BASOPHILS NFR BLD AUTO: 0.7 % — SIGNIFICANT CHANGE UP (ref 0–1)
BILIRUB SERPL-MCNC: 0.6 MG/DL — SIGNIFICANT CHANGE UP (ref 0.2–1.2)
BUN SERPL-MCNC: 14 MG/DL — SIGNIFICANT CHANGE UP (ref 10–20)
BUN SERPL-MCNC: 16 MG/DL — SIGNIFICANT CHANGE UP (ref 10–20)
CALCIUM SERPL-MCNC: 9 MG/DL — SIGNIFICANT CHANGE UP (ref 8.4–10.5)
CALCIUM SERPL-MCNC: 9.6 MG/DL — SIGNIFICANT CHANGE UP (ref 8.4–10.5)
CHLORIDE SERPL-SCNC: 103 MMOL/L — SIGNIFICANT CHANGE UP (ref 98–110)
CHLORIDE SERPL-SCNC: 105 MMOL/L — SIGNIFICANT CHANGE UP (ref 98–110)
CHOLEST SERPL-MCNC: 161 MG/DL — SIGNIFICANT CHANGE UP
CO2 SERPL-SCNC: 23 MMOL/L — SIGNIFICANT CHANGE UP (ref 17–32)
CO2 SERPL-SCNC: 25 MMOL/L — SIGNIFICANT CHANGE UP (ref 17–32)
CREAT SERPL-MCNC: 0.9 MG/DL — SIGNIFICANT CHANGE UP (ref 0.7–1.5)
CREAT SERPL-MCNC: 1.1 MG/DL — SIGNIFICANT CHANGE UP (ref 0.7–1.5)
EGFR: 51 ML/MIN/1.73M2 — LOW
EGFR: 51 ML/MIN/1.73M2 — LOW
EGFR: 65 ML/MIN/1.73M2 — SIGNIFICANT CHANGE UP
EGFR: 65 ML/MIN/1.73M2 — SIGNIFICANT CHANGE UP
EOSINOPHIL # BLD AUTO: 0.2 K/UL — SIGNIFICANT CHANGE UP (ref 0–0.7)
EOSINOPHIL NFR BLD AUTO: 2.8 % — SIGNIFICANT CHANGE UP (ref 0–8)
GLUCOSE SERPL-MCNC: 102 MG/DL — HIGH (ref 70–99)
GLUCOSE SERPL-MCNC: 98 MG/DL — SIGNIFICANT CHANGE UP (ref 70–99)
HCT VFR BLD CALC: 31.7 % — LOW (ref 37–47)
HDLC SERPL-MCNC: 62 MG/DL — SIGNIFICANT CHANGE UP
HGB BLD-MCNC: 10.6 G/DL — LOW (ref 12–16)
IMM GRANULOCYTES NFR BLD AUTO: 0.6 % — HIGH (ref 0.1–0.3)
IRON SATN MFR SERPL: 26 % — SIGNIFICANT CHANGE UP (ref 15–50)
IRON SATN MFR SERPL: 66 UG/DL — SIGNIFICANT CHANGE UP (ref 35–150)
LDLC SERPL-MCNC: 86 MG/DL — SIGNIFICANT CHANGE UP
LIPID PNL WITH DIRECT LDL SERPL: 86 MG/DL — SIGNIFICANT CHANGE UP
LYMPHOCYTES # BLD AUTO: 1 K/UL — LOW (ref 1.2–3.4)
LYMPHOCYTES # BLD AUTO: 14 % — LOW (ref 20.5–51.1)
MAGNESIUM SERPL-MCNC: 2.6 MG/DL — HIGH (ref 1.8–2.4)
MCHC RBC-ENTMCNC: 30.1 PG — SIGNIFICANT CHANGE UP (ref 27–31)
MCHC RBC-ENTMCNC: 33.4 G/DL — SIGNIFICANT CHANGE UP (ref 32–37)
MCV RBC AUTO: 90.1 FL — SIGNIFICANT CHANGE UP (ref 81–99)
MONOCYTES # BLD AUTO: 0.55 K/UL — SIGNIFICANT CHANGE UP (ref 0.1–0.6)
MONOCYTES NFR BLD AUTO: 7.7 % — SIGNIFICANT CHANGE UP (ref 1.7–9.3)
NEUTROPHILS # BLD AUTO: 5.32 K/UL — SIGNIFICANT CHANGE UP (ref 1.4–6.5)
NEUTROPHILS NFR BLD AUTO: 74.2 % — SIGNIFICANT CHANGE UP (ref 42.2–75.2)
NONHDLC SERPL-MCNC: 99 MG/DL — SIGNIFICANT CHANGE UP
NRBC BLD AUTO-RTO: 0 /100 WBCS — SIGNIFICANT CHANGE UP (ref 0–0)
NT-PROBNP SERPL-SCNC: 2509 PG/ML — HIGH (ref 0–300)
PLATELET # BLD AUTO: 253 K/UL — SIGNIFICANT CHANGE UP (ref 130–400)
PMV BLD: 9.2 FL — SIGNIFICANT CHANGE UP (ref 7.4–10.4)
POTASSIUM SERPL-MCNC: 4.6 MMOL/L — SIGNIFICANT CHANGE UP (ref 3.5–5)
POTASSIUM SERPL-MCNC: 4.7 MMOL/L — SIGNIFICANT CHANGE UP (ref 3.5–5)
POTASSIUM SERPL-SCNC: 4.6 MMOL/L — SIGNIFICANT CHANGE UP (ref 3.5–5)
POTASSIUM SERPL-SCNC: 4.7 MMOL/L — SIGNIFICANT CHANGE UP (ref 3.5–5)
PROT SERPL-MCNC: 6.4 G/DL — SIGNIFICANT CHANGE UP (ref 6–8)
RBC # BLD: 3.52 M/UL — LOW (ref 4.2–5.4)
RBC # BLD: 3.52 M/UL — LOW (ref 4.2–5.4)
RBC # FLD: 13.2 % — SIGNIFICANT CHANGE UP (ref 11.5–14.5)
RETICS #: 58.8 K/UL — SIGNIFICANT CHANGE UP (ref 25–125)
RETICS/RBC NFR: 1.7 % — HIGH (ref 0.5–1.5)
SODIUM SERPL-SCNC: 137 MMOL/L — SIGNIFICANT CHANGE UP (ref 135–146)
SODIUM SERPL-SCNC: 141 MMOL/L — SIGNIFICANT CHANGE UP (ref 135–146)
TIBC SERPL-MCNC: 258 UG/DL — SIGNIFICANT CHANGE UP (ref 220–430)
TRIGL SERPL-MCNC: 67 MG/DL — SIGNIFICANT CHANGE UP
UIBC SERPL-MCNC: 192 UG/DL — SIGNIFICANT CHANGE UP (ref 110–370)
WBC # BLD: 7.16 K/UL — SIGNIFICANT CHANGE UP (ref 4.8–10.8)
WBC # FLD AUTO: 7.16 K/UL — SIGNIFICANT CHANGE UP (ref 4.8–10.8)

## 2025-06-22 PROCEDURE — 99232 SBSQ HOSP IP/OBS MODERATE 35: CPT

## 2025-06-22 PROCEDURE — 93306 TTE W/DOPPLER COMPLETE: CPT | Mod: 26

## 2025-06-22 PROCEDURE — 99223 1ST HOSP IP/OBS HIGH 75: CPT

## 2025-06-22 RX ORDER — METOPROLOL SUCCINATE 50 MG/1
5 TABLET, EXTENDED RELEASE ORAL ONCE
Refills: 0 | Status: COMPLETED | OUTPATIENT
Start: 2025-06-22 | End: 2025-06-22

## 2025-06-22 RX ADMIN — METOPROLOL SUCCINATE 50 MILLIGRAM(S): 50 TABLET, EXTENDED RELEASE ORAL at 14:06

## 2025-06-22 RX ADMIN — LISINOPRIL 2.5 MILLIGRAM(S): 5 TABLET ORAL at 21:25

## 2025-06-22 RX ADMIN — APIXABAN 2.5 MILLIGRAM(S): 2.5 TABLET, FILM COATED ORAL at 09:09

## 2025-06-22 RX ADMIN — APIXABAN 2.5 MILLIGRAM(S): 2.5 TABLET, FILM COATED ORAL at 21:24

## 2025-06-22 RX ADMIN — METOPROLOL SUCCINATE 50 MILLIGRAM(S): 50 TABLET, EXTENDED RELEASE ORAL at 21:24

## 2025-06-22 RX ADMIN — Medication 25 MICROGRAM(S): at 05:08

## 2025-06-22 RX ADMIN — METOPROLOL SUCCINATE 5 MILLIGRAM(S): 50 TABLET, EXTENDED RELEASE ORAL at 05:08

## 2025-06-22 RX ADMIN — SERTRALINE 25 MILLIGRAM(S): 100 TABLET, FILM COATED ORAL at 12:38

## 2025-06-22 NOTE — PROGRESS NOTE ADULT - ASSESSMENT
79-year-old female with past medical history of  HFrEF 25-30% improved to 50-55% (last echo in december 2023), paroxysmal atrial fibrillation s/p ablation in 2023  on Eliquis  (follows with cardiology Dr. Driscoll and Dr. Martinez , planned for ablation in was planned for another ablation in 10/2025 as patient was in Afib last office admission with  Dr. 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 Patient did complain of some associated lightheadedness and worsening of her usual dyspnea on exertion but denies chest pain. She called her son and decided to come to the hospital. EKG showed with afib with RVR s/p cardizem push in the ED. Admitted for further management    [] Afib with RVR  []Hypomagnesemia   [] prolonged Qtc   [] elevated lactate     - EKG showing afib with RVR to 150s, QTc 523  - VS wnl  - Last echo in 12/2023: AMANDEEP: EF 50-55%, severe LA dilation, mild MR s/p mitral anular ring insertion, moderate TR  - trop 14>14  - lactate 2.4  - s/p IV cardizem 20mg in the ED  - PAtient on metoprolol 100 ER and eliquis 2.5 at home  - Patient has never stopped her AC  - Was planned for AMANDEEP/DCCV in June and ablation by Dr. Martinez in 10/2025  - Increase metoprolol to 50 tartrate q8 with holding parameters  - CHADSVASC at least 4, continue with eliquis 2.5  - Replete electrolytes and monitor   - repeat EKG  - Check tSH  - Repeat echo, last Echo in 2023  -EP recommended AMANDEEP and DCCV in am       #HFrEF 25-30. improved to 50-55%  #MR s/p MV repair and LAAC  - Patient euvolemic on exam  - Repeat echo   - Obtain BNP  - Repete lactate and ABG  - Patient on lisinopril 2.5 and metoprolol at home  - continue with lisnopril and increase metoprolol  -   #Mild Normocytic anemia  -Anemia w/u  outpatient f/u           DVT PPX: eliquis 2.5

## 2025-06-22 NOTE — CONSULT NOTE ADULT - SUBJECTIVE AND OBJECTIVE BOX
Patient is a 79y old  Female who presents with a chief complaint of       HPI:  79-year-old female with past medical history of  HFrEF 25-30% improved to 50-55% (last echo in december 2023), paroxysmal atrial fibrillation s/p ablation in 2023  on Eliquis  (follows with cardiology Dr. Driscoll and Dr. Lopez , planned for ablation in was planned for another ablation in 10/2025 as patient was in Afib last office admission with  Dr. Lopez), MR  status post repair and LAAC , LUIS ARMANDO adenocarcinoma s/p resection and CRT in 2018, presents with palpitations that woke her up from sleep. Patient did complain of some associated lightheadedness and worsening of her usual dyspnea on exertion but denies chest pain. She called her son and decided to come to the hospital.   Patient denied any recent illness, sick contacts, fevers, chills, headaches, cough, orthopnea or dyspnea at rest, abdominal pain, n/v, diarrhea, urinary symptoms, claudications, or lower extremity edema,  Of note patient has recently seen Dr. Lopez and was scheduled for ablation in 10/2025.      In the ED:   · BP Systolic	 90 mm Hg  · BP Diastolic	 58 mm Hg  · Heart Rate	 160 /min  · Respiration Rate (breaths/min)	18 /min  · Temp (C)	37.2 Degrees C  · SpO2 (%)	99 %      Labs: Hb 11.5, MCV     , (at baseline)  EKG showing Afib with , LBBB   trop 14>14.    S/p  bolus and cardizem 20 IV push    Patient admitted for management of Afib with RVR     (21 Jun 2025 16:31)      EP consulted for AF RVR management. Pt is known to Dr. Lopez. Pt was planned for DCCV this month with ablation in October. Pt states she is compliant with her medications. She is on metoprolol and Eliquis at home.     REVIEW OF SYSTEMS    [x] A ten-point review of systems was otherwise negative except as noted.  [ ] Due to altered mental status/intubation, subjective information were not able to be obtained from the patient. History was obtained, to the extent possible, from review of the chart and collateral sources of information.      PAST MEDICAL & SURGICAL HISTORY:  HTN (hypertension)      Paroxysmal atrial fibrillation      Non-small cell lung cancer      Severe mitral valve regurgitation      S/P lobectomy of lung      History of chemotherapy      S/P radiation therapy      S/P mitral valve repair          Home Medications:  Eliquis 2.5 mg oral tablet: 1 tab(s) orally 2 times a day (21 Jun 2025 17:51)  levothyroxine 25 mcg (0.025 mg) oral tablet: 1 tab(s) orally once a day (21 Jun 2025 17:55)  lisinopril 2.5 mg oral tablet: 1 tab(s) orally once a day (at bedtime) (21 Jun 2025 17:53)  sertraline 25 mg oral tablet: 1 tab(s) orally once a day (06 Dec 2023 07:21)      Allergies:    No Known Allergies      FAMILY HISTORY:      SOCIAL HISTORY:  No  Remote smoker  Patient lives alone  able to perform ADL (dressing, bathing, cooking, groceries), walks with no difficulty or assistance, can walk more than 2 blocks with no stopping    MEDICATIONS  (STANDING):  apixaban 2.5 milliGRAM(s) Oral every 12 hours  levothyroxine 25 MICROGram(s) Oral daily  lisinopril 2.5 milliGRAM(s) Oral daily  metoprolol tartrate 50 milliGRAM(s) Oral every 8 hours  sertraline 25 milliGRAM(s) Oral daily    MEDICATIONS  (PRN):      Vital Signs Last 24 Hrs  T(C): 36.7 (22 Jun 2025 07:33), Max: 37.2 (21 Jun 2025 11:42)  T(F): 98 (22 Jun 2025 07:33), Max: 99 (21 Jun 2025 11:49)  HR: 88 (22 Jun 2025 07:33) (88 - 160)  BP: 100/63 (22 Jun 2025 07:33) (90/58 - 117/66)  BP(mean): 73 (22 Jun 2025 05:02) (73 - 80)  RR: 18 (22 Jun 2025 07:33) (16 - 18)  SpO2: 96% (22 Jun 2025 07:33) (95% - 99%)    Parameters below as of 22 Jun 2025 07:33  Patient On (Oxygen Delivery Method): room air        PHYSICAL EXAM:    GENERAL: Elderly female,  In no apparent distress, well nourished, and hydrated.  HEART: Irregular rate and rhythm; No murmurs, rubs, or gallops.  PULMONARY: Clear to auscultation and perfusion.  No rales, wheezing, or rhonchi bilaterally.  ABDOMEN: Soft, Nontender, Nondistended; Bowel sounds present  EXTREMITIES:  Weal pulses b/l , No clubbing, cyanosis, or edema  NEUROLOGICAL: AO x4, KURTZ, speech clear    INTERPRETATION OF TELEMETRY:  bpm    ECG:  < from: 12 Lead ECG (06.21.25 @ 20:03) >  Ventricular Rate 102 BPM    Atrial Rate 108 BPM    P-R Interval 184 ms    QRS Duration 140 ms    Q-T Interval 382 ms    QTC Calculation(Bazett) 497 ms    P Axis 107 degrees    R Axis -27 degrees    T Axis 107 degrees    Diagnosis Line Sinus tachycardia    < end of copied text >    I&O's Detail      LABS:                        10.6   7.16  )-----------( 253      ( 22 Jun 2025 07:43 )             31.7     06-22    137  |  103  |  14  ----------------------------<  102[H]  4.7   |  23  |  0.9    Ca    9.0      22 Jun 2025 07:43  Mg     2.6     06-22    TPro  6.4  /  Alb  3.6  /  TBili  0.6  /  DBili  x   /  AST  18  /  ALT  12  /  AlkPhos  80  06-22        PT/INR - ( 21 Jun 2025 12:00 )   PT: 15.90 sec;   INR: 1.34 ratio         PTT - ( 21 Jun 2025 12:00 )  PTT:39.1 sec  BNP      I&O's Detail      RADIOLOGY:  < from: TTE Echo Complete w/o Contrast w/ Doppler (05.13.23 @ 09:37) >  Summary:   1. Diffuse hypokinesis with severe hypokinesis/akinesis of the anterior   wall and resultant severely depressed EF, by visual estimation, of 25 to   30%. The left ventricular diastolic function could not be assessed in   this study due to atrial fibrillation and mitral annuloplasty. Mild   concentric left ventricular hypertrophy.   2. Normal right ventricular size with moderately reducedsystolic   function.   3. Severe biatrial dilatation.   4. S/p mitral annuloplasty with mild regurgitation and no evidence of   stenosis (mean PG of 3.0mmHg at 86bpm).   5. No pericardial effusion.   6. A large pleural effusion is noted.    PHYSICIANINTERPRETATION:  Left Ventricle: There is mild concentric left ventricular hypertrophy.   Global LV systolic function was severely decreased. Left ventricular   ejection fraction, by visual estimation, is 25 to 30%. The left   ventricular diastolic function could not be assessed in this study.      LV Wall Scoring:  The entire septum is akinetic. The entire lateral wall, entire anterior   wall,  entire inferior wall, and apex are hypokinetic.    Right Ventricle: The right ventricular size is normal. RV systolic   function is moderately reduced.  Left Atrium: Severely enlarged left atrium. LA volume Index is 50.0 ml/m²   ml/m2.  Right Atrium: Severely enlarged right atrium. RA Area is 19.80 cm² cm2.  Pericardium: There is no evidence of pericardial effusion. A large   pleural effusion is noted.  Mitral Valve: Thickening of the anterior mitral valve leaflet. Mild   mitral valve regurgitation is seen. S/p mitral annuloplasty with mild   regurgitation and no evidence of stenosis (mean PG of 3.0mmHg at 86bpm).  Tricuspid Valve: Structurally normal tricuspid valve, with normal leaflet   excursion. Mild tricuspid regurgitation is visualized.  Aortic Valve: The aortic valve is trileaflet. No aortic stenosis. No   evidence of aortic valve regurgitation is seen.  Pulmonic Valve: Structurally normal pulmonic valve, with normal leaflet   excursion. Mild pulmonic valve regurgitation.  Aorta: The aortic root and ascending aorta are structurally normal, with   no evidence of dilitation.  Venous:Patient on Mechanical ventilation unable to assess Right Atrial   pressure.    < end of copied text >

## 2025-06-22 NOTE — PROGRESS NOTE ADULT - SUBJECTIVE AND OBJECTIVE BOX
INTERVAL HPI/OVERNIGHT EVENTS:  79-year-old female with past medical history of  HFrEF 25-30% improved to 50-55% (last echo in december 2023), paroxysmal atrial fibrillation s/p ablation in 2023  on Eliquis  (follows with cardiology Dr. Driscoll and Dr. Martinez , planned for ablation in was planned for another ablation in 10/2025 as patient was in Afib last office admission with  Dr. 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 Patient did complain of some associated lightheadedness and worsening of her usual dyspnea on exertion but denies chest pain. She called her son and decided to come to the hospital. EKG showed with afib with RVR s/p cardizem push in the ED. Admitted for further management      REVIEW OF SYSTEMS:  CONSTITUTIONAL: No fever, weight loss, or fatigue  EYES: No eye pain, visual disturbances, or discharge  ENMT:  No difficulty hearing, tinnitus, vertigo; No sinus or throat pain  NECK: No pain or stiffness  BREASTS: No pain, masses, or nipple discharge  RESPIRATORY: No cough, wheezing, chills or hemoptysis; No shortness of breath  CARDIOVASCULAR: No chest pain, palpitations, dizziness, or leg swelling  GASTROINTESTINAL: No abdominal or epigastric pain. No nausea, vomiting, or hematemesis; No diarrhea or constipation. No melena or hematochezia.  GENITOURINARY: No dysuria, frequency, hematuria, or incontinence  NEUROLOGICAL: No headaches, memory loss, loss of strength, numbness, or tremors  SKIN: No itching, burning, rashes, or lesions   LYMPH NODES: No enlarged glands  ENDOCRINE: No heat or cold intolerance; No hair loss  MUSCULOSKELETAL: No joint pain or swelling; No muscle, back, or extremity pain  PSYCHIATRIC: No depression, anxiety, mood swings, or difficulty sleeping  HEME/LYMPH: No easy bruising, or bleeding gums  ALLERY AND IMMUNOLOGIC: No hives or eczema    VITALS:  T(F): 98, Max: 98 (06-22-25 @ 00:01)  HR: 88  BP: 100/63  RR: 18  SpO2: 96%    PHYSICAL EXAM:  GENERAL: NAD, well-groomed, well-developed  HEAD:  Atraumatic, Normocephalic  EYES: EOMI, PERRLA, conjunctiva and sclera clear  ENMT: No tonsillar erythema, exudates, or enlargement; Moist mucous membranes, Good dentition, No lesions  NECK: Supple, No JVD, Normal thyroid  NERVOUS SYSTEM:  Alert & Oriented X3, Good concentration; Motor Strength 5/5 B/L upper and lower extremities; DTRs 2+ intact and symmetric  CHEST/LUNG: Clear to percussion bilaterally; No rales, rhonchi, wheezing, or rubs  HEART: Regular rate and rhythm; No murmurs, rubs, or gallops  ABDOMEN: Soft, Nontender, Nondistended; Bowel sounds present  EXTREMITIES:  2+ Peripheral Pulses, No clubbing, cyanosis, or edema  LYMPH: No lymphadenopathy noted  SKIN: No rashes or lesions      LABS:  Urinalysis Basic - ( 22 Jun 2025 07:43 )    Color: x / Appearance: x / SG: x / pH: x  Gluc: 102 mg/dL / Ketone: x  / Bili: x / Urobili: x   Blood: x / Protein: x / Nitrite: x   Leuk Esterase: x / RBC: x / WBC x   Sq Epi: x / Non Sq Epi: x / Bacteria: x      06-22    137  |  103  |  14  ----------------------------<  102[H]  4.7   |  23  |  0.9    Ca    9.0      22 Jun 2025 07:43  Mg     2.6     06-22    TPro  6.4  /  Alb  3.6  /  TBili  0.6  /  DBili  x   /  AST  18  /  ALT  12  /  AlkPhos  80  06-22                          10.6   7.16  )-----------( 253      ( 22 Jun 2025 07:43 )             31.7           RADIOLOGY & ADDITIONAL TESTS:    Imaging Personally Reviewed:  [ ] YES  [ ] NO    MEDICATIONS:     MEDICATIONS  (STANDING):  apixaban 2.5 milliGRAM(s) Oral every 12 hours  levothyroxine 25 MICROGram(s) Oral daily  lisinopril 2.5 milliGRAM(s) Oral daily  metoprolol tartrate 50 milliGRAM(s) Oral every 8 hours  sertraline 25 milliGRAM(s) Oral daily    MEDICATIONS  (PRN):

## 2025-06-22 NOTE — CONSULT NOTE ADULT - ASSESSMENT
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 Found to be in AF RVR.    Impression:  AF RVR 130s  Hx HFrEF 25-30 % (5/2023), improved 50-55% (12/23)  Hx PAF s/p ablation 2023  Hx MVr and KOBY closure    Plan:  - Recommend AMANDEEP/DCCV tomorrow  - NPO after midnight  - Cont Eliquis 2.5 Q 12 for stroke prevention (meets criteria for reduced dosing)  - Metoprolol increased from home dose  - Maintain k>4 and mag>2  - Tele while admitted  - TTE after DCCV  - Fu TSH  - Please recall EPS as needed, [phone]

## 2025-06-22 NOTE — CONSULT NOTE ADULT - NS ATTEND AMEND GEN_ALL_CORE FT
AFL    Eliquis  BB at current dose  NPO p MN for AMANDEEP/DCCV- add on in cath lab. Contact [phone] for more info  Discussed UGTI, CVA risks. Agreeable  OP f-up

## 2025-06-23 ENCOUNTER — TRANSCRIPTION ENCOUNTER (OUTPATIENT)
Age: 80
End: 2025-06-23

## 2025-06-23 ENCOUNTER — RESULT REVIEW (OUTPATIENT)
Age: 80
End: 2025-06-23

## 2025-06-23 VITALS
TEMPERATURE: 97 F | RESPIRATION RATE: 18 BRPM | HEART RATE: 73 BPM | DIASTOLIC BLOOD PRESSURE: 57 MMHG | OXYGEN SATURATION: 96 % | SYSTOLIC BLOOD PRESSURE: 111 MMHG

## 2025-06-23 LAB
A1C WITH ESTIMATED AVERAGE GLUCOSE RESULT: 5.9 % — HIGH (ref 4–5.6)
ESTIMATED AVERAGE GLUCOSE: 123 MG/DL — HIGH (ref 68–114)
FERRITIN SERPL-MCNC: 47 NG/ML — SIGNIFICANT CHANGE UP (ref 13–330)
FOLATE SERPL-MCNC: 10 NG/ML — SIGNIFICANT CHANGE UP
TSH SERPL-MCNC: 1.7 UIU/ML — SIGNIFICANT CHANGE UP (ref 0.27–4.2)
TSH SERPL-MCNC: 2.4 UIU/ML — SIGNIFICANT CHANGE UP (ref 0.27–4.2)
VIT B12 SERPL-MCNC: >2000 PG/ML — HIGH (ref 232–1245)

## 2025-06-23 PROCEDURE — 92960 CARDIOVERSION ELECTRIC EXT: CPT | Mod: XU

## 2025-06-23 PROCEDURE — 93010 ELECTROCARDIOGRAM REPORT: CPT

## 2025-06-23 PROCEDURE — 99239 HOSP IP/OBS DSCHRG MGMT >30: CPT

## 2025-06-23 PROCEDURE — 93312 ECHO TRANSESOPHAGEAL: CPT | Mod: 26

## 2025-06-23 PROCEDURE — 93325 DOPPLER ECHO COLOR FLOW MAPG: CPT | Mod: 26

## 2025-06-23 PROCEDURE — 93320 DOPPLER ECHO COMPLETE: CPT | Mod: 26

## 2025-06-23 RX ORDER — METOPROLOL SUCCINATE 50 MG/1
5 TABLET, EXTENDED RELEASE ORAL ONCE
Refills: 0 | Status: COMPLETED | OUTPATIENT
Start: 2025-06-23 | End: 2025-06-23

## 2025-06-23 RX ORDER — APIXABAN 2.5 MG/1
2.5 TABLET, FILM COATED ORAL ONCE
Refills: 0 | Status: COMPLETED | OUTPATIENT
Start: 2025-06-23 | End: 2025-06-23

## 2025-06-23 RX ADMIN — SERTRALINE 25 MILLIGRAM(S): 100 TABLET, FILM COATED ORAL at 15:54

## 2025-06-23 RX ADMIN — APIXABAN 2.5 MILLIGRAM(S): 2.5 TABLET, FILM COATED ORAL at 13:01

## 2025-06-23 RX ADMIN — METOPROLOL SUCCINATE 5 MILLIGRAM(S): 50 TABLET, EXTENDED RELEASE ORAL at 06:14

## 2025-06-23 RX ADMIN — METOPROLOL SUCCINATE 5 MILLIGRAM(S): 50 TABLET, EXTENDED RELEASE ORAL at 06:55

## 2025-06-23 RX ADMIN — Medication 25 MICROGRAM(S): at 06:13

## 2025-06-23 RX ADMIN — METOPROLOL SUCCINATE 50 MILLIGRAM(S): 50 TABLET, EXTENDED RELEASE ORAL at 06:14

## 2025-06-23 NOTE — DISCHARGE NOTE PROVIDER - CARE PROVIDER_API CALL
Seng Martinez  Clinical Cardiac Electrophysiology  [address] [place] [address], [address]  [place] [address]  Phone: [phone]  Fax: [phone]  Follow Up Time: 2 weeks

## 2025-06-23 NOTE — CHART NOTE - NSCHARTNOTEFT_GEN_A_CORE
Patient herte for AMANDEEP/DCCV for AFib.   No absolute contraindications at this time for procedure Patient here for AMANDEEP/DCCV for AFib.   No absolute contraindications at this time for procedure Pre-procedure Assessment:    Procedure:  AMANDEEP/DCCV  Indication:  Aflutter  Cardiologist / EP:  Adama    Chart reviewed.  No contraindications.  I agree with the assessment and plan and noted any changes below.  06-23-25 @ 12:24

## 2025-06-23 NOTE — DISCHARGE NOTE PROVIDER - NSDCMRMEDTOKEN_GEN_ALL_CORE_FT
Eliquis 2.5 mg oral tablet: 1 tab(s) orally 2 times a day  levothyroxine 25 mcg (0.025 mg) oral tablet: 1 tab(s) orally once a day  lisinopril 2.5 mg oral tablet: 1 tab(s) orally once a day (at bedtime)  metoprolol succinate 100 mg oral tablet, extended release: 1 tab(s) orally once a day  sertraline 25 mg oral tablet: 1 tab(s) orally once a day

## 2025-06-23 NOTE — PRE-ANESTHESIA EVALUATION ADULT - TEMPERATURE IN CELSIUS (DEGREES C)
pt complaining of vaginal itching, reports having unprotected sex with boyfriend who tested positive for syphilis 2 weeks ago. 36.7

## 2025-06-23 NOTE — DISCHARGE NOTE PROVIDER - NSDCCPCAREPLAN_GEN_ALL_CORE_FT
PRINCIPAL DISCHARGE DIAGNOSIS  Diagnosis: Atrial fibrillation with RVR  Assessment and Plan of Treatment: sp AMANDEEP/DCCV. Follow up with EP cardiologist within 2-4 weeks of discharge.      SECONDARY DISCHARGE DIAGNOSES  Diagnosis: Hypomagnesemia  Assessment and Plan of Treatment:     Diagnosis: QT prolongation  Assessment and Plan of Treatment:

## 2025-06-23 NOTE — CHART NOTE - NSCHARTNOTEFT_GEN_A_CORE
POST OPERATIVE PROCEDURAL DOCUMENTATION  PRE-OP DIAGNOSIS:  AFlutter      POST-OP DIAGNOSIS:  AFlutter  s/p successful CV    PROCEDURE: Transesophageal echocardiogram    Primary Physician:  Dr. Driscoll   Assistant: Dr. Salter     ANESTHESIA TYPE  [  ] General Anesthesia  [ x ] Conscious Sedation  [  ] Local/Regional    CONDITION  [  ] Critical  [  ] Serious  [  ] Fair  [ x ] Good    SPECIMENS REMOVED (IF APPLICABLE): N/A    IMPLANTS (IF APPLICABLE): None    ESTIMATED BLOOD LOSS: None    COMPLICATIONS: None      FINDINGS:    After risks and benefits of procedures were explained, informed consent was obtained and placed in chart. Refer to Anesthesia note for sedation details.  The AMANDEEP probe was passed into the esophagus without difficulty.  Transesophageal  images were obtained.  The AMANDEEP probe was removed without difficulty and examined.  There was no evidence for bleeding.  The patient tolerated the procedure well without any immediate AMANDEEP-related complications.      Preliminary Findings:  LA: Enlarged   KOBY: Left atrial appendage ligated/closed.   MV: Annuloplasty ring with mild MR, no evidence of MS.   AV: No evidence of AI, no evidence of AS.   RA: Enlarged   TV: Mild TR.   PV: Mild PI.   IAS: no PFO. No R-> L shunt by color doppler.  Aorta:  simple atheroma of aortic arch / desc aorta    DIAGNOSIS/IMPRESSION:  Patient successfully converted to sinus rhythm with synchronized  200 J of direct current cardioversion.      PLAN OF CARE:  c/w Anticoagulation   return to floor  f/u with cardiologist TRANSESOPHAGEAL ECHOCARDIOGRAM PROCEDURE NOTE    PRE-OP DIAGNOSIS:  Aflutter    POST-OP DIAGNOSIS:  Ligated / excluded left atrial appendage.  Successful cardioversion to SR.    PROCEDURE:  Transesophageal echocardiogram and DC cardioversion    Primary Physician:  Americo  Fellow:  ADAM Salter    ANESTHESIA TYPE  [ ]  General Anesthesia  [X] Conscious Sedation  [ ]  Local/Regional    CONDITION  [ ] Critical  [ ] Serious  [ ] Fair  [X] Good    SPECIMENS REMOVED (IF APPLICABLE):  N/A    IMPLANTS (IF APPLICABLE):  N/A    ESTIMATED BLOOD LOSS:  None    COMPLICATIONS:  None    Risks and benefits of procedures were explained and informed consent was obtained.   Topical anesthetic to the oropharynx with viscous lidocaine and benzocaine spray.  Refer to Anesthesia documentation for sedation details.  The AMANDEEP probe was passed into the esophagus and removed without difficulty.  No evidence of bleeding.  The patient tolerated the procedure well without any immediate complications.    Preliminary Findings:  Ligated / excluded KOBY.  No residual flow.  Mitral annuloplasty ring.  Mild MR.  Mild-mod TR    Successful cardioversion to SR with 200 J x 1.    Full report to follow.

## 2025-06-23 NOTE — DISCHARGE NOTE PROVIDER - NSDCFUSCHEDAPPT_GEN_ALL_CORE_FT
Fernanda Driscoll  [facility] PreAdmits  Scheduled Appointment: 06/30/2025    Fernanda Driscoll  [facility] PreAdmits  Scheduled Appointment: 07/07/2025

## 2025-06-23 NOTE — DISCHARGE NOTE NURSING/CASE MANAGEMENT/SOCIAL WORK - PATIENT PORTAL LINK FT
You can access the FollowMyHealth Patient Portal offered by [facility] by registering at the following website: [email or web address]. By joining [employer]’s FollowMyHealth portal, you will also be able to view your health information using other applications (apps) compatible with our system.

## 2025-06-23 NOTE — DISCHARGE NOTE NURSING/CASE MANAGEMENT/SOCIAL WORK - FINANCIAL ASSISTANCE
[facility] provides services at a reduced cost to those who are determined to be eligible through [facility]’s financial assistance program. Information regarding [facility]’s financial assistance program can be found by going to [email or web address] or by calling [phone].

## 2025-06-23 NOTE — DISCHARGE NOTE PROVIDER - HOSPITAL COURSE
79-year-old female with past medical history of  HFrEF 25-30% improved to 50-55% (last echo in december 2023), paroxysmal atrial fibrillation s/p ablation in 2023  on Eliquis  (follows with cardiology Dr. Driscoll and Dr. Martinez , planned for ablation in was planned for another ablation in 10/2025 as patient was in Afib last office admission with  Dr. Martinez). Pt admitted with Afib RVR. She is sp successful cardioversion today. Medically stable for discharge with outpatient EP follow up.

## 2025-06-27 DIAGNOSIS — I08.1 RHEUMATIC DISORDERS OF BOTH MITRAL AND TRICUSPID VALVES: ICD-10-CM

## 2025-06-27 DIAGNOSIS — I48.92 UNSPECIFIED ATRIAL FLUTTER: ICD-10-CM

## 2025-06-27 DIAGNOSIS — I11.0 HYPERTENSIVE HEART DISEASE WITH HEART FAILURE: ICD-10-CM

## 2025-06-27 DIAGNOSIS — I50.22 CHRONIC SYSTOLIC (CONGESTIVE) HEART FAILURE: ICD-10-CM

## 2025-06-27 DIAGNOSIS — Z87.891 PERSONAL HISTORY OF NICOTINE DEPENDENCE: ICD-10-CM

## 2025-06-27 DIAGNOSIS — E83.42 HYPOMAGNESEMIA: ICD-10-CM

## 2025-06-27 DIAGNOSIS — Z85.118 PERSONAL HISTORY OF OTHER MALIGNANT NEOPLASM OF BRONCHUS AND LUNG: ICD-10-CM

## 2025-06-27 DIAGNOSIS — I48.0 PAROXYSMAL ATRIAL FIBRILLATION: ICD-10-CM

## 2025-06-27 DIAGNOSIS — D64.9 ANEMIA, UNSPECIFIED: ICD-10-CM

## 2025-07-01 ENCOUNTER — APPOINTMENT (OUTPATIENT)
Dept: CARDIOLOGY | Facility: CLINIC | Age: 80
End: 2025-07-01
Payer: MEDICARE

## 2025-07-01 VITALS
HEART RATE: 77 BPM | SYSTOLIC BLOOD PRESSURE: 115 MMHG | HEIGHT: 64 IN | DIASTOLIC BLOOD PRESSURE: 60 MMHG | BODY MASS INDEX: 21 KG/M2 | WEIGHT: 123 LBS

## 2025-07-01 PROCEDURE — 93000 ELECTROCARDIOGRAM COMPLETE: CPT

## 2025-07-01 PROCEDURE — 99214 OFFICE O/P EST MOD 30 MIN: CPT

## 2025-07-01 PROCEDURE — G2211 COMPLEX E/M VISIT ADD ON: CPT

## 2025-07-29 ENCOUNTER — APPOINTMENT (OUTPATIENT)
Dept: ELECTROPHYSIOLOGY | Facility: CLINIC | Age: 80
End: 2025-07-29

## 2025-08-25 ENCOUNTER — APPOINTMENT (OUTPATIENT)
Dept: ELECTROPHYSIOLOGY | Facility: CLINIC | Age: 80
End: 2025-08-25
Payer: MEDICARE

## 2025-08-25 VITALS
HEIGHT: 64 IN | WEIGHT: 119 LBS | HEART RATE: 99 BPM | BODY MASS INDEX: 20.32 KG/M2 | DIASTOLIC BLOOD PRESSURE: 70 MMHG | SYSTOLIC BLOOD PRESSURE: 102 MMHG

## 2025-08-25 DIAGNOSIS — I10 ESSENTIAL (PRIMARY) HYPERTENSION: ICD-10-CM

## 2025-08-25 DIAGNOSIS — Z78.9 OTHER SPECIFIED HEALTH STATUS: ICD-10-CM

## 2025-08-25 DIAGNOSIS — I48.91 UNSPECIFIED ATRIAL FIBRILLATION: ICD-10-CM

## 2025-08-25 DIAGNOSIS — I42.9 CARDIOMYOPATHY, UNSPECIFIED: ICD-10-CM

## 2025-08-25 PROCEDURE — 99215 OFFICE O/P EST HI 40 MIN: CPT | Mod: 25

## 2025-08-25 PROCEDURE — 93000 ELECTROCARDIOGRAM COMPLETE: CPT
